# Patient Record
Sex: FEMALE | Race: WHITE | NOT HISPANIC OR LATINO | Employment: OTHER | URBAN - METROPOLITAN AREA
[De-identification: names, ages, dates, MRNs, and addresses within clinical notes are randomized per-mention and may not be internally consistent; named-entity substitution may affect disease eponyms.]

---

## 2017-01-30 NOTE — PROGRESS NOTES
SUBJECTIVE:                                                    Erwin Tolliver is a 81 year old female who presents to clinic today for the following health issues:    HPI    Acute Illness   Acute illness concerns: sinus  Onset: 1 month     Fever: no    Chills/Sweats: YES    Headache (location?): no    Sinus Pressure:YES    Conjunctivitis:  YES: left    Ear Pain: no    Rhinorrhea: YES    Congestion: YES    Sore Throat: no     Cough: YES-productive of green sputum    Wheeze: no    Decreased Appetite: no    Nausea: no    Vomiting: no    Diarrhea:  no    Dysuria/Freq.: no    Fatigue/Achiness: YES    Sick/Strep Exposure: no     Therapies Tried and outcome: Advil cold-helpful    Was seen in December, for similar issues, given doxycycline and it improved, but then symptoms recurred a month.  States had shingles episodes around [place] time. Then after the shingles cleared she then she started with runny nose and head congestion and cough, feeling run down and tired.   had similar issues but improved.     She tells me that she feels she has had sinus issues for the last 2 years, feels it just keeps coming back. She has had 4 sinusitis episodes in the clinic in the last 2 years that she has sought treatment for.  She also complains of fatigue during this time as well.    She wonders if her sinus issues are exacerbating her depression.  She answered her PHQ-9 with a positive thoughts of death or hurting herself, but when questioned she states that this was a mistake and should have been ), she hasn't felt that way for a long time.    Problem list and histories reviewed & adjusted, as indicated.  Additional history: as documented    Problem list, Medication list, Allergies, and Medical/Social/Surgical histories reviewed in [facility] and updated as appropriate.    ROS:  C: NEGATIVE for fever, chills, change in weight  R: denies shortness of breath   CV: NEGATIVE for chest pain, palpitations or peripheral  "edema    OBJECTIVE:                                                    /80 mmHg  Pulse 80  Temp(Src) 99  F (37.2  C) (Temporal)  Resp 16  Ht 5' 2.48\" (1.587 m)  Wt 150 lb (68.04 kg)  BMI 27.02 kg/m2  SpO2 97%  Body mass index is 27.02 kg/(m^2).  Gen: no apparent distress  HENT: Head; normocephalic, atraumatic. PERRLA, sclera and conjunctiva clear. ENT- bilateral TM normal without fluid or infection, neck without nodes, throat normal without erythema or exudate, maxillary sinuses tender and nasal mucosa pale and congested.  Chest/CV: S1 and S2 normal, no murmurs, clicks, gallops or rubs. Regular rate and rhythm. Chest is clear; no wheezes or rales. No edema.  Psych: Alert and oriented times 3; coherent speech, normal   rate and volume, able to articulate logical thoughts, able   to abstract reason, no tangential thoughts, no hallucinations   or delusions  Her affect is neutral.     ASSESSMENT/PLAN:                                                      1. Acute recurrent maxillary sinusitis  Will try Bactrim and Flonase, refer to ENT for recurrent sinus issues.    - sulfamethoxazole-trimethoprim (BACTRIM DS/SEPTRA DS) 800-160 MG per tablet; Take 1 tablet by mouth 2 times daily  Dispense: 20 tablet; Refill: 0  - OTOLARYNGOLOGY REFERRAL  - fluticasone (FLONASE) 50 MCG/ACT spray; Spray 2 sprays into both nostrils daily  Dispense: 16 g; Refill: 11    2. Hypothyroidism, unspecified type  Recheck labs.    - TSH with free T4 reflex  - levothyroxine (SYNTHROID/LEVOTHROID) 88 MCG tablet; Take 1 tablet (88 mcg) by mouth daily  Dispense: 90 tablet; Refill: 3    3. CKD (chronic kidney disease) stage 3, GFR 30-59 ml/min  Recheck labs.    - CBC with platelets  - Comprehensive metabolic panel  - Lipid panel reflex to direct LDL    4. Hypertension, goal below 150/90  Stable, refills given, labs drawn.    - CBC with platelets  - Comprehensive metabolic panel  - Lipid panel reflex to direct LDL  - metoprolol (TOPROL-XL) 100 " MG 24 hr tablet; Take 1 tablet (100 mg) by mouth daily  Dispense: 90 tablet; Refill: 3    5. Hyperlipidemia, unspecified  Remains on statin, labs drawn.    - Comprehensive metabolic panel  - Lipid panel reflex to direct LDL  - simvastatin (ZOCOR) 20 MG tablet; TAKE ONE TABLET BY MOUTH AT BEDTIME  Dispense: 90 tablet; Refill: 3    6. Gastroesophageal reflux disease without esophagitis  Stable.    - omeprazole (PRILOSEC) 20 MG CR capsule; TAKE ONE CAPSULE BY MOUTH EVERY DAY AS NEEDED  Dispense: 45 capsule; Refill: 3    7. Moderate recurrent major depression (H)  Stable, she adamantly denies suicidality.    - venlafaxine (EFFEXOR-XR) 150 MG 24 hr capsule; Take 1 capsule (150 mg) by mouth daily  Dispense: 90 capsule; Refill: 3  - buPROPion (WELLBUTRIN XL) 300 MG 24 hr tablet; Take 1 tablet (300 mg) by mouth every morning  Dispense: 90 tablet; Refill: 3    8. Insomnia, unspecified type  Controlled.    - traZODone (DESYREL) 50 MG tablet; TAKE 2-3 TABLETS BY MOUTH NIGHTLY AS NEEDED FOR SLEEP  Dispense: 270 tablet; Refill: 3    9. Bilateral shoulder pain, unspecified chronicity  Uses prn.    - naproxen (NAPROSYN) 500 MG tablet; Take 1 tablet (500 mg) by mouth 2 times daily as needed for moderate pain  Dispense: 180 tablet; Refill: 0    Jacqueline Brandt MD  [facility]

## 2017-01-31 ENCOUNTER — OFFICE VISIT (OUTPATIENT)
Dept: FAMILY MEDICINE | Facility: OTHER | Age: 82
End: 2017-01-31
Payer: COMMERCIAL

## 2017-01-31 VITALS
DIASTOLIC BLOOD PRESSURE: 80 MMHG | WEIGHT: 150 LBS | RESPIRATION RATE: 16 BRPM | HEART RATE: 80 BPM | TEMPERATURE: 99 F | BODY MASS INDEX: 27.6 KG/M2 | SYSTOLIC BLOOD PRESSURE: 140 MMHG | HEIGHT: 62 IN | OXYGEN SATURATION: 97 %

## 2017-01-31 DIAGNOSIS — E03.9 HYPOTHYROIDISM, UNSPECIFIED TYPE: ICD-10-CM

## 2017-01-31 DIAGNOSIS — G47.00 INSOMNIA, UNSPECIFIED TYPE: ICD-10-CM

## 2017-01-31 DIAGNOSIS — M25.512 BILATERAL SHOULDER PAIN, UNSPECIFIED CHRONICITY: ICD-10-CM

## 2017-01-31 DIAGNOSIS — J01.01 ACUTE RECURRENT MAXILLARY SINUSITIS: Primary | ICD-10-CM

## 2017-01-31 DIAGNOSIS — E78.5 HYPERLIPIDEMIA, UNSPECIFIED: ICD-10-CM

## 2017-01-31 DIAGNOSIS — N18.30 CKD (CHRONIC KIDNEY DISEASE) STAGE 3, GFR 30-59 ML/MIN (H): ICD-10-CM

## 2017-01-31 DIAGNOSIS — M25.511 BILATERAL SHOULDER PAIN, UNSPECIFIED CHRONICITY: ICD-10-CM

## 2017-01-31 DIAGNOSIS — F33.1 MODERATE RECURRENT MAJOR DEPRESSION (H): ICD-10-CM

## 2017-01-31 DIAGNOSIS — I10 HYPERTENSION, GOAL BELOW 150/90: ICD-10-CM

## 2017-01-31 DIAGNOSIS — K21.9 GASTROESOPHAGEAL REFLUX DISEASE WITHOUT ESOPHAGITIS: ICD-10-CM

## 2017-01-31 LAB
ALBUMIN SERPL-MCNC: 3.8 G/DL (ref 3.4–5)
ALP SERPL-CCNC: 84 U/L (ref 40–150)
ALT SERPL W P-5'-P-CCNC: 24 U/L (ref 0–50)
ANION GAP SERPL CALCULATED.3IONS-SCNC: 4 MMOL/L (ref 3–14)
AST SERPL W P-5'-P-CCNC: 17 U/L (ref 0–45)
BILIRUB SERPL-MCNC: 0.6 MG/DL (ref 0.2–1.3)
BUN SERPL-MCNC: 13 MG/DL (ref 7–30)
CALCIUM SERPL-MCNC: 8.7 MG/DL (ref 8.5–10.1)
CHLORIDE SERPL-SCNC: 102 MMOL/L (ref 94–109)
CHOLEST SERPL-MCNC: 161 MG/DL
CO2 SERPL-SCNC: 33 MMOL/L (ref 20–32)
CREAT SERPL-MCNC: 0.95 MG/DL (ref 0.52–1.04)
ERYTHROCYTE [DISTWIDTH] IN BLOOD BY AUTOMATED COUNT: 13.4 % (ref 10–15)
GFR SERPL CREATININE-BSD FRML MDRD: 56 ML/MIN/1.7M2
GLUCOSE SERPL-MCNC: 106 MG/DL (ref 70–99)
HCT VFR BLD AUTO: 39.6 % (ref 35–47)
HDLC SERPL-MCNC: 57 MG/DL
HGB BLD-MCNC: 13 G/DL (ref 11.7–15.7)
LDLC SERPL CALC-MCNC: 77 MG/DL
MCH RBC QN AUTO: 32.8 PG (ref 26.5–33)
MCHC RBC AUTO-ENTMCNC: 32.8 G/DL (ref 31.5–36.5)
MCV RBC AUTO: 100 FL (ref 78–100)
NONHDLC SERPL-MCNC: 104 MG/DL
PLATELET # BLD AUTO: 247 10E9/L (ref 150–450)
POTASSIUM SERPL-SCNC: 4 MMOL/L (ref 3.4–5.3)
PROT SERPL-MCNC: 7.5 G/DL (ref 6.8–8.8)
RBC # BLD AUTO: 3.96 10E12/L (ref 3.8–5.2)
SODIUM SERPL-SCNC: 139 MMOL/L (ref 133–144)
TRIGL SERPL-MCNC: 135 MG/DL
TSH SERPL DL<=0.005 MIU/L-ACNC: 3.97 MU/L (ref 0.4–4)
WBC # BLD AUTO: 9.9 10E9/L (ref 4–11)

## 2017-01-31 PROCEDURE — 80053 COMPREHEN METABOLIC PANEL: CPT | Performed by: FAMILY MEDICINE

## 2017-01-31 PROCEDURE — 85027 COMPLETE CBC AUTOMATED: CPT | Performed by: FAMILY MEDICINE

## 2017-01-31 PROCEDURE — 36415 COLL VENOUS BLD VENIPUNCTURE: CPT | Performed by: FAMILY MEDICINE

## 2017-01-31 PROCEDURE — 99214 OFFICE O/P EST MOD 30 MIN: CPT | Mod: 25 | Performed by: FAMILY MEDICINE

## 2017-01-31 PROCEDURE — 80061 LIPID PANEL: CPT | Performed by: FAMILY MEDICINE

## 2017-01-31 PROCEDURE — 84443 ASSAY THYROID STIM HORMONE: CPT | Performed by: FAMILY MEDICINE

## 2017-01-31 RX ORDER — LEVOTHYROXINE SODIUM 88 UG/1
88 TABLET ORAL DAILY
Qty: 90 TABLET | Refills: 3 | Status: SHIPPED | OUTPATIENT
Start: 2017-01-31 | End: 2017-11-24

## 2017-01-31 RX ORDER — VENLAFAXINE HYDROCHLORIDE 150 MG/1
150 CAPSULE, EXTENDED RELEASE ORAL DAILY
Qty: 90 CAPSULE | Refills: 3 | Status: SHIPPED | OUTPATIENT
Start: 2017-01-31 | End: 2017-05-16

## 2017-01-31 RX ORDER — SIMVASTATIN 20 MG
TABLET ORAL
Qty: 90 TABLET | Refills: 3 | Status: SHIPPED | OUTPATIENT
Start: 2017-01-31 | End: 2017-11-24

## 2017-01-31 RX ORDER — TRAZODONE HYDROCHLORIDE 50 MG/1
TABLET, FILM COATED ORAL
Qty: 270 TABLET | Refills: 3 | Status: SHIPPED | OUTPATIENT
Start: 2017-01-31 | End: 2017-11-24

## 2017-01-31 RX ORDER — METOPROLOL SUCCINATE 100 MG/1
100 TABLET, EXTENDED RELEASE ORAL DAILY
Qty: 90 TABLET | Refills: 3 | Status: SHIPPED | OUTPATIENT
Start: 2017-01-31 | End: 2017-11-24

## 2017-01-31 RX ORDER — BUPROPION HYDROCHLORIDE 300 MG/1
300 TABLET ORAL EVERY MORNING
Qty: 90 TABLET | Refills: 3 | Status: SHIPPED | OUTPATIENT
Start: 2017-01-31 | End: 2017-11-24

## 2017-01-31 RX ORDER — FLUTICASONE PROPIONATE 50 MCG
2 SPRAY, SUSPENSION (ML) NASAL DAILY
Qty: 16 G | Refills: 11 | Status: SHIPPED | OUTPATIENT
Start: 2017-01-31 | End: 2017-05-16

## 2017-01-31 RX ORDER — NAPROXEN 500 MG/1
500 TABLET ORAL 2 TIMES DAILY PRN
Qty: 180 TABLET | Refills: 0 | Status: SHIPPED | OUTPATIENT
Start: 2017-01-31 | End: 2017-06-13

## 2017-01-31 RX ORDER — SULFAMETHOXAZOLE/TRIMETHOPRIM 800-160 MG
1 TABLET ORAL 2 TIMES DAILY
Qty: 20 TABLET | Refills: 0 | Status: SHIPPED | OUTPATIENT
Start: 2017-01-31 | End: 2017-04-12

## 2017-01-31 ASSESSMENT — ANXIETY QUESTIONNAIRES
GAD7 TOTAL SCORE: 0
GAD7 TOTAL SCORE: 0
7. FEELING AFRAID AS IF SOMETHING AWFUL MIGHT HAPPEN: 0 = NOT AT ALL

## 2017-01-31 ASSESSMENT — PATIENT HEALTH QUESTIONNAIRE - PHQ9: SUM OF ALL RESPONSES TO PHQ QUESTIONS 1-9: 4

## 2017-01-31 NOTE — MR AVS SNAPSHOT
After Visit Summary   1/31/2017    Erwin Tolliver    MRN: [id number]           Patient Information     Date Of Birth          1935        Visit Information        Provider Department      1/31/2017 9:20 AM Jacqueline Brandt MD [facility]        Today's Diagnoses     Acute recurrent maxillary sinusitis    -  1     Hypothyroidism, unspecified type         CKD (chronic kidney disease) stage 3, GFR 30-59 ml/min         Hypertension, goal below 150/90         Hyperlipidemia, unspecified         Gastroesophageal reflux disease without esophagitis         Moderate recurrent major depression (H)         Insomnia, unspecified type         Bilateral shoulder pain, unspecified chronicity            Follow-ups after your visit        Additional Services     OTOLARYNGOLOGY REFERRAL       Your provider has referred you to: [facility]: [facility] [phone]   [email or web address]    Please be aware that coverage of these services is subject to the terms and limitations of your health insurance plan.  Call member services at your health plan with any benefit or coverage questions.      Please bring the following with you to your appointment:    (1) Any X-Rays, CTs or MRIs which have been performed.  Contact the facility where they were done to arrange for  prior to your scheduled appointment.   (2) List of current medications  (3) This referral request   (4) Any documents/labs given to you for this referral                  Your next 10 appointments already scheduled     Mar 01, 2017  9:15 AM   New Visit with Edwin Cox MD   [facility]    [address] [place] [address] [address]  [facility] [id number] [phone]              Who to contact     If you have questions or need follow up information about today's clinic visit or your schedule please contact [facility] directly at  "[phone].  Normal or non-critical lab and imaging results will be communicated to you by RETAIL PROhart, letter or phone within 4 business days after the clinic has received the results. If you do not hear from us within 7 days, please contact the clinic through LineHopt or phone. If you have a critical or abnormal lab result, we will notify you by phone as soon as possible.  Submit refill requests through [employer] or call your pharmacy and they will forward the refill request to us. Please allow 3 business days for your refill to be completed.          Additional Information About Your Visit        RETAIL PROhart Information     [employer] gives you secure access to your electronic health record. If you see a primary care provider, you can also send messages to your care team and make appointments. If you have questions, please call your primary care clinic.  If you do not have a primary care provider, please call [phone] and they will assist you.        Care EveryWhere ID     This is your Care EveryWhere ID. This could be used by other organizations to access your [facility] medical records  [id number]        Your Vitals Were     Pulse Temperature Respirations Height BMI (Body Mass Index) Pulse Oximetry    80 99  F (37.2  C) (Temporal) 16 5' 2.48\" (1.587 m) 27.02 kg/m2 97%       Blood Pressure from Last 3 Encounters:   01/31/17 140/80   12/01/16 151/86   08/16/16 142/81    Weight from Last 3 Encounters:   01/31/17 150 lb (68.04 kg)   05/27/16 149 lb 12.8 oz (67.949 kg)   04/15/16 148 lb (67.132 kg)              We Performed the Following     CBC with platelets     Comprehensive metabolic panel     Lipid panel reflex to direct LDL     OTOLARYNGOLOGY REFERRAL     TSH with free T4 reflex          Today's Medication Changes          These changes are accurate as of: 1/31/17 10:05 AM.  If you have any questions, ask your nurse or doctor.               Start taking these medicines.        Dose/Directions    " sulfamethoxazole-trimethoprim 800-160 MG per tablet   Commonly known as:  BACTRIM DS/SEPTRA DS   Used for:  Acute recurrent maxillary sinusitis   Started by:  Jacqueline Brandt MD        Dose:  1 tablet   Take 1 tablet by mouth 2 times daily   Quantity:  20 tablet   Refills:  0         These medicines have changed or have updated prescriptions.        Dose/Directions    metoprolol 100 MG 24 hr tablet   Commonly known as:  TOPROL-XL   This may have changed:  See the new instructions.   Used for:  Hypertension, goal below 150/90   Changed by:  Jacqueline Brandt MD        Dose:  100 mg   Take 1 tablet (100 mg) by mouth daily   Quantity:  90 tablet   Refills:  3       omeprazole 20 MG CR capsule   Commonly known as:  priLOSEC   This may have changed:  See the new instructions.   Used for:  Gastroesophageal reflux disease without esophagitis   Changed by:  Jacqueline Brandt MD        TAKE ONE CAPSULE BY MOUTH EVERY DAY AS NEEDED   Quantity:  45 capsule   Refills:  3       traZODone 50 MG tablet   Commonly known as:  DESYREL   This may have changed:  See the new instructions.   Used for:  Insomnia, unspecified type   Changed by:  Jacqueline Brandt MD        TAKE 2-3 TABLETS BY MOUTH NIGHTLY AS NEEDED FOR SLEEP   Quantity:  270 tablet   Refills:  3            Where to get your medicines      These medications were sent to [facility] [address] [address]     Phone:  [phone]    - buPROPion 300 MG 24 hr tablet  - fluticasone 50 MCG/ACT spray  - levothyroxine 88 MCG tablet  - metoprolol 100 MG 24 hr tablet  - naproxen 500 MG tablet  - omeprazole 20 MG CR capsule  - simvastatin 20 MG tablet  - sulfamethoxazole-trimethoprim 800-160 MG per tablet  - traZODone 50 MG tablet  - venlafaxine 150 MG 24 hr capsule             Primary Care Provider Office Phone # Fax #    Jacqueline Brandt [phone]       [facility]  [address] [facility] [address]  [facility] [address]        Thank you!     Thank you for choosing [facility]  for your care. Our goal is always to provide you with excellent care. Hearing back from our patients is one way we can continue to improve our services. Please take a few minutes to complete the written survey that you may receive in the mail after your visit with us. Thank you!             Your Updated Medication List - Protect others around you: Learn how to safely use, store and throw away your medicines at www.disposemymeds.org.          This list is accurate as of: 1/31/17 10:05 AM.  Always use your most recent med list.                   Brand Name Dispense Instructions for use    ADVIL COLD/SINUS PO          buPROPion 300 MG 24 hr tablet    WELLBUTRIN XL    90 tablet    Take 1 tablet (300 mg) by mouth every morning       fluticasone 50 MCG/ACT spray    FLONASE    16 g    Spray 2 sprays into both nostrils daily       levothyroxine 88 MCG tablet    SYNTHROID/LEVOTHROID    90 tablet    Take 1 tablet (88 mcg) by mouth daily       meloxicam 7.5 MG tablet    MOBIC    30 tablet    Take 1 tablet (7.5 mg) by mouth 2 times daily as needed (headache)       metoprolol 100 MG 24 hr tablet    TOPROL-XL    90 tablet    Take 1 tablet (100 mg) by mouth daily       naproxen 500 MG tablet    NAPROSYN    180 tablet    Take 1 tablet (500 mg) by mouth 2 times daily as needed for moderate pain       omeprazole 20 MG CR capsule    priLOSEC    45 capsule    TAKE ONE CAPSULE BY MOUTH EVERY DAY AS NEEDED       simvastatin 20 MG tablet    ZOCOR    90 tablet    TAKE ONE TABLET BY MOUTH AT BEDTIME       sulfamethoxazole-trimethoprim 800-160 MG per tablet    BACTRIM DS/SEPTRA DS    20 tablet    Take 1 tablet by mouth 2 times daily       traZODone 50 MG tablet    DESYREL    270 tablet    TAKE 2-3 TABLETS BY MOUTH NIGHTLY AS NEEDED FOR SLEEP       venlafaxine 150 MG 24 hr capsule    EFFEXOR-XR    90 capsule    Take 1 capsule  (150 mg) by mouth daily

## 2017-01-31 NOTE — Clinical Note
My Depression Action Plan  Name: Erwin Tolliver   Date of Birth 1935  Date: 1/30/2017    My doctor: Jacqueline Brandt   My clinic: [address]  [facility] [id number] [phone]          GREEN    ZONE   Good Control    What it looks like:     Things are going generally well. You have normal up s and down s. You may even feel depressed from time to time, but bad moods usually last less than a day.   What you need to do:  1. Continue to care for yourself (see self care plan)  2. Check your depression survival kit and update it as needed  3. Follow your physician s recommendations including any medication.  4. Do not stop taking medication unless you consult with your physician first.           YELLOW         ZONE Getting Worse    What it looks like:     Depression is starting to interfere with your life.     It may be hard to get out of bed; you may be starting to isolate yourself from others.    Symptoms of depression are starting to last most all day and this has happened for several days.     You may have suicidal thoughts but they are not constant.   What you need to do:     1. Call your care team, your response to treatment will improve if you keep your care team informed of your progress. Yellow periods are signs an adjustment may need to be made.     2. Continue your self-care, even if you have to fake it!    3. Talk to someone in your support network    4. Open up your depression survival kit           RED    ZONE Medical Alert - Get Help    What it looks like:     Depression is seriously interfering with your life.     You may experience these or other symptoms: You can t get out of bed most days, can t work or engage in other necessary activities, you have trouble taking care of basic hygiene, or basic responsibilities, thoughts of suicide or death that will not go away, self-injurious behavior.     What you need to do:  1. Call your  care team and request a same-day appointment. If they are not available (weekends or after hours) call your local crisis line, emergency room or 911.      Electronically signed by: Coco Pretty, January 30, 2017    Depression Self Care Plan / Survival Kit    Self-Care for Depression  Here s the deal. Your body and mind are really not as separate as most people think.  What you do and think affects how you feel and how you feel influences what you do and think. This means if you do things that people who feel good do, it will help you feel better.  Sometimes this is all it takes.  There is also a place for medication and therapy depending on how severe your depression is, so be sure to consult with your medical provider and/ or Behavioral Health Consultant if your symptoms are worsening or not improving.     In order to better manage my stress, I will:    Exercise  Get some form of exercise, every day. This will help reduce pain and release endorphins, the  feel good  chemicals in your brain. This is almost as good as taking antidepressants!  This is not the same as joining a gym and then never going! (they count on that by the way ) It can be as simple as just going for a walk or doing some gardening, anything that will get you moving.      Hygiene   Maintain good hygiene (Get out of bed in the morning, Make your bed, Brush your teeth, Take a shower, and Get dressed like you were going to work, even if you are unemployed).  If your clothes don't fit try to get ones that do.    Diet  I will strive to eat foods that are good for me, drink plenty of water, and avoid excessive sugar, caffeine, alcohol, and other mood-altering substances.  Some foods that are helpful in depression are: complex carbohydrates, B vitamins, flaxseed, fish or fish oil, fresh fruits and vegetables.    Psychotherapy  I agree to participate in Individual Therapy (if recommended).    Medication  If prescribed medications, I agree to take them.   Missing doses can result in serious side effects.  I understand that drinking alcohol, or other illicit drug use, may cause potential side effects.  I will not stop my medication abruptly without first discussing it with my provider.    Staying Connected With Others  I will stay in touch with my friends, family members, and my primary care provider/team.    Use your imagination  Be creative.  We all have a creative side; it doesn t matter if it s oil painting, sand castles, or mud pies! This will also kick up the endorphins.    Witness Beauty  (AKA stop and smell the roses) Take a look outside, even in mid-winter. Notice colors, textures. Watch the squirrels and birds.     Service to others  Be of service to others.  There is always someone else in need.  By helping others we can  get out of ourselves  and remember the really important things.  This also provides opportunities for practicing all the other parts of the program.    Humor  Laugh and be silly!  Adjust your TV habits for less news and crime-drama and more comedy.    Control your stress  Try breathing deep, massage therapy, biofeedback, and meditation. Find time to relax each day.     My support system    Clinic Contact:  Phone number:    Contact 1:  Phone number:    Contact 2:  Phone number:    Yazidism/:  Phone number:    Therapist:  Phone number:    Local crisis center:    Phone number:    Other community support:  Phone number:

## 2017-02-01 ASSESSMENT — ANXIETY QUESTIONNAIRES: GAD7 TOTAL SCORE: 0

## 2017-02-01 ASSESSMENT — PATIENT HEALTH QUESTIONNAIRE - PHQ9: SUM OF ALL RESPONSES TO PHQ QUESTIONS 1-9: 4

## 2017-02-28 NOTE — PROGRESS NOTES
History of Present Illness - Erwin Tolliver is a 81 year old female who presents with concerns about sinus symptoms. The main symptom recently has been nasal congestion, and this has been on going. Other associated symptoms have included pain and pressure in face, eye pain, sore throat, cough.  These symptoms have been intermittent and are very disruptive to the patient's lifestyle. Erwin has been seen for sinus problems 5 times this last year.  She has a lot of coughing and nasal drainage.  She denies pain in her ears and headaches.   Recent treatments have included BactrimDS, Advil cold, Flonase.  A 2 week trial of nasal steroids has been completed. A burst of oral steroids has not been completed. The patient has no history of sinus surgery. The patient reports a history for migraine headaches.    Past Medical History -   Patient Active Problem List   Diagnosis     Migraine with aura     Hypothyroidism     Hyperlipidemia, unspecified     Osteopenia     GERD (gastroesophageal reflux disease)     Atopic rhinitis     CKD (chronic kidney disease) stage 3, GFR 30-59 ml/min     SVT (supraventricular tachycardia) (H)     Advanced directives, counseling/discussion     Moderate recurrent major depression (H)     Macular degeneration (senile) of retina     Hypertension, goal below 150/90       Current Medications -   Current Outpatient Prescriptions:      sulfamethoxazole-trimethoprim (BACTRIM DS/SEPTRA DS) 800-160 MG per tablet, Take 1 tablet by mouth 2 times daily, Disp: 20 tablet, Rfl: 0     metoprolol (TOPROL-XL) 100 MG 24 hr tablet, Take 1 tablet (100 mg) by mouth daily, Disp: 90 tablet, Rfl: 3     omeprazole (PRILOSEC) 20 MG CR capsule, TAKE ONE CAPSULE BY MOUTH EVERY DAY AS NEEDED, Disp: 45 capsule, Rfl: 3     venlafaxine (EFFEXOR-XR) 150 MG 24 hr capsule, Take 1 capsule (150 mg) by mouth daily, Disp: 90 capsule, Rfl: 3     simvastatin (ZOCOR) 20 MG tablet, TAKE ONE TABLET BY MOUTH AT BEDTIME, Disp: 90  tablet, Rfl: 3     levothyroxine (SYNTHROID/LEVOTHROID) 88 MCG tablet, Take 1 tablet (88 mcg) by mouth daily, Disp: 90 tablet, Rfl: 3     buPROPion (WELLBUTRIN XL) 300 MG 24 hr tablet, Take 1 tablet (300 mg) by mouth every morning, Disp: 90 tablet, Rfl: 3     fluticasone (FLONASE) 50 MCG/ACT spray, Spray 2 sprays into both nostrils daily, Disp: 16 g, Rfl: 11     traZODone (DESYREL) 50 MG tablet, TAKE 2-3 TABLETS BY MOUTH NIGHTLY AS NEEDED FOR SLEEP, Disp: 270 tablet, Rfl: 3     naproxen (NAPROSYN) 500 MG tablet, Take 1 tablet (500 mg) by mouth 2 times daily as needed for moderate pain, Disp: 180 tablet, Rfl: 0     Pseudoephedrine-Ibuprofen (ADVIL COLD/SINUS PO), , Disp: , Rfl:      meloxicam (MOBIC) 7.5 MG tablet, Take 1 tablet (7.5 mg) by mouth 2 times daily as needed (headache), Disp: 30 tablet, Rfl: 5    Allergies -   Allergies   Allergen Reactions     Aspirin      headaches,tremors,nausea     Augmentin [Amoxicillin-Pot Clavulanate] Nausea and Vomiting       Social History -   Social History     Social History     Marital status:      Spouse name: Marcellus     Number of children: 4     Years of education: 12     Occupational History     retired      Social History Main Topics     Smoking status: Former Smoker     Packs/day: 0.50     Years: 10.00     Quit date: 1966     Smokeless tobacco: Never Used      Comment: no smokers in household     Alcohol use 1.0 oz/week      Comment: beerx2/ x8per month     Drug use: No     Sexual activity: Yes     Partners: Male     Birth control/ protection: Surgical      Comment: Complete Hysterectomy     Other Topics Concern     Parent/Sibling W/ Cabg, Mi Or Angioplasty Before 65f 55m? No     Social History Narrative       Family History -   Family History   Problem Relation Age of Onset     Alzheimer Disease Mother      Arthritis Sister       at 72 years     Lipids Sister      OSTEOPOROSIS Sister      osteoporosis     Genetic Disorder Sister      down syndrome      Alzheimer Disease Sister      Thyroid Disease Sister      Cardiovascular Brother       at 72 years of CHF       Review of Systems - As per HPI and PMHx, otherwise system review of the head and neck negative.    Physical Exam  Vitals: There were no vitals taken for this visit.  BMI= There is no height or weight on file to calculate BMI.    General - The patient is well nourished and well developed, and appears to have good nutritional status.  Alert and oriented to person and place, answers questions and cooperates with examination appropriately.     Head and Face - Normocephalic and atraumatic, with no gross asymmetry noted of the contour of the facial features.  The facial nerve is intact, with strong symmetric movements.    Voice and Breathing - The patient was breathing comfortably without the use of accessory muscles. There was no wheezing, stridor, or stertor.  The patients voice was clear and strong, and had appropriate pitch and quality.    Ears - [place] wears hearing aids.  Bilateral pinna and EACs with normal appearing overlying skin. Tympanic membrane intact with good mobility on pneumatic otoscopy bilaterally. Bony landmarks of the ossicular chain are normal. The tympanic membranes are normal in appearance. No retraction, perforation, or masses.  No fluid or purulence was seen in the external canal or the middle ear.     Eyes - Extraocular movements intact.  Sclera were not icteric or injected, conjunctiva were pink and moist.    Mouth - Examination of the oral cavity showed pink, healthy oral mucosa. No lesions or ulcerations noted.  The tongue was mobile and midline, and the dentition were in good condition.      Throat - The walls of the oropharynx were smooth, pink, moist, symmetric, and had no lesions or ulcerations.  The tonsillar pillars and soft palate were symmetric.  The uvula was midline on elevation.    Neck - Normal midline excursion of the laryngotracheal complex during swallowing.   Full range of motion on passive movement.  Palpation of the occipital, submental, submandibular, internal jugular chain, and supraclavicular nodes did not demonstrate any abnormal lymph nodes or masses.  The carotid pulse was palpable bilaterally.  Palpation of the thyroid was soft and smooth, with no nodules or goiter appreciated.  The trachea was mobile and midline.    Nose - External contour is symmetric, no gross deflection or scars.  [place] denies issues with seance of smell.  Nasal mucosa is pink and moist with no abnormal mucus.  Sores in bilateral nostrils of nose. The septum was slightly deviated and non-obstructive, turbinates of normal size and position.  No polyps, masses, or purulence noted on examination.    Neuro - Nonfocal neuro exam is normal, CN 2 through 12 intact, normal gait and muscle tone.        To further evaluate the nasal cavity, I performed rigid nasal endoscopy.  I first sprayed the nasal cavity bilaterally with a mix of lidocaine and neosynephrine.  I then began on the left side using a 2.7mm, 30 degree rigid nasal endoscope.  The septum was deviated to the Left and the nasal airway was open.  No abnormal secretions, purulence, or polyps were noted. The left middle turbinate and middle meatus were clearly visualized and normal in appearance.  Looking up, the olfactory cleft was unobstructed.  Going further back, the sphenoethmoid recess was normal in appearance, with healthy appearing mucosa on the face of the sphenoid.  The nasopharynx was unremarkable, and the eustachian tube opening on this side was unobstructed.    I then turned my attention to the right side.  Once again, the septum was deviated to the Left, and the airway was open.  No abnormal secretions, purulence, polyps were noted.  The right middle turbinate and middle meatus were clearly visualized and normal in appearance.  Looking up, the olfactory cleft was unobstructed.  Going further back the right sphenoethmoid recess  was normal in appearance, and eustachian tube opening was unobstructed.      ASSESSMENT/PLAN:  Erwin Tolliver is a 81 year old female with chronic sinusitis.    Since the symptoms have persisted longer than 10 days, a bacterial etiology should be suspected and therefore I have initiated therapy with Augmentin 875mg/500mg BID for 7 days, and encouraged use of saline irrigations for decongestion. I also offered that Afrin may be used twice daily for 5 days for symptom control, but that it should be used for no longer. I recommend NSAIDs for pain control and to reduce sinus inflammation. I look forward to our follow-up in 10-14 days to assess progress.    The prolonged nature of the symptoms warrants a workup for anatomic anomalies of the sinonasal outflow or evidence of persistent chronic inflammation. In an effort to maximize medical therapy I have started Clarithromycin 500 mg twice a day for 30 days. I will refer her to Allergy and also have her set up for a CT scan for her sinuses.   I encouraged daily nasal saline irrigations and instructed on the proper use of the nasal steroids, She will follow up with us in 6 weeks.    Edwin Cox MD

## 2017-03-01 ENCOUNTER — OFFICE VISIT (OUTPATIENT)
Dept: ALLERGY | Facility: OTHER | Age: 82
End: 2017-03-01
Payer: COMMERCIAL

## 2017-03-01 ENCOUNTER — OFFICE VISIT (OUTPATIENT)
Dept: OTOLARYNGOLOGY | Facility: OTHER | Age: 82
End: 2017-03-01
Payer: COMMERCIAL

## 2017-03-01 VITALS — WEIGHT: 148 LBS | HEART RATE: 84 BPM | BODY MASS INDEX: 26.66 KG/M2 | OXYGEN SATURATION: 96 %

## 2017-03-01 VITALS
DIASTOLIC BLOOD PRESSURE: 78 MMHG | SYSTOLIC BLOOD PRESSURE: 146 MMHG | OXYGEN SATURATION: 98 % | BODY MASS INDEX: 27.23 KG/M2 | HEIGHT: 62 IN | WEIGHT: 148 LBS | HEART RATE: 68 BPM

## 2017-03-01 DIAGNOSIS — J32.8 OTHER CHRONIC SINUSITIS: Primary | ICD-10-CM

## 2017-03-01 DIAGNOSIS — J30.89 SEASONAL ALLERGIC RHINITIS DUE TO OTHER ALLERGIC TRIGGER: ICD-10-CM

## 2017-03-01 DIAGNOSIS — Z88.0 PENICILLIN ALLERGY: ICD-10-CM

## 2017-03-01 DIAGNOSIS — R04.0 EPISTAXIS: ICD-10-CM

## 2017-03-01 DIAGNOSIS — J32.0 CHRONIC MAXILLARY SINUSITIS: Primary | ICD-10-CM

## 2017-03-01 PROCEDURE — 95004 PERQ TESTS W/ALRGNC XTRCS: CPT | Performed by: ALLERGY & IMMUNOLOGY

## 2017-03-01 PROCEDURE — 36415 COLL VENOUS BLD VENIPUNCTURE: CPT | Performed by: ALLERGY & IMMUNOLOGY

## 2017-03-01 PROCEDURE — 99000 SPECIMEN HANDLING OFFICE-LAB: CPT | Performed by: ALLERGY & IMMUNOLOGY

## 2017-03-01 PROCEDURE — 31231 NASAL ENDOSCOPY DX: CPT | Performed by: OTOLARYNGOLOGY

## 2017-03-01 PROCEDURE — 86003 ALLG SPEC IGE CRUDE XTRC EA: CPT | Performed by: ALLERGY & IMMUNOLOGY

## 2017-03-01 PROCEDURE — 99203 OFFICE O/P NEW LOW 30 MIN: CPT | Mod: 25 | Performed by: OTOLARYNGOLOGY

## 2017-03-01 PROCEDURE — 99204 OFFICE O/P NEW MOD 45 MIN: CPT | Mod: 25 | Performed by: ALLERGY & IMMUNOLOGY

## 2017-03-01 RX ORDER — IPRATROPIUM BROMIDE 21 UG/1
2 SPRAY, METERED NASAL 3 TIMES DAILY
Qty: 1 BOX | Refills: 3 | Status: SHIPPED | OUTPATIENT
Start: 2017-03-01 | End: 2021-11-26

## 2017-03-01 RX ORDER — CLARITHROMYCIN 500 MG
500 TABLET ORAL 2 TIMES DAILY
Qty: 60 TABLET | Refills: 0 | Status: SHIPPED | OUTPATIENT
Start: 2017-03-01 | End: 2017-04-12

## 2017-03-01 NOTE — MR AVS SNAPSHOT
After Visit Summary   3/1/2017    Erwin Tolliver    MRN: [id number]           Patient Information     Date Of Birth          1935        Visit Information        Provider Department      3/1/2017 9:15 AM Edwin Cox MD [facility]        Today's Diagnoses     Chronic maxillary sinusitis    -  1       Follow-ups after your visit        Additional Services     ALLERGY/ASTHMA ADULT REFERRAL       Your provider has referred you to: [facility]: [facility] [phone] [email or web address]    Please be aware that coverage of these services is subject to the terms and limitations of your health insurance plan.  Call member services at your health plan with any benefit or coverage questions.      Please bring the following with you to your appointment:    (1) Any X-Rays, CTs or MRIs which have been performed.  Contact the facility where they were done to arrange for  prior to your scheduled appointment.    (2) List of current medications  (3) This referral request   (4) Any documents/labs given to you for this referral                  Your next 10 appointments already scheduled     Mar 02, 2017  2:00 PM CST   CT MAXILLOFACIAL W/O CONTRAST with PHCT1   [facility] CT Scan ([facility])    [address] [place] [id number] [phone]           Please bring any scans or X-rays taken at other hospitals, if similar tests were done. Also bring a list of your medicines, including vitamins, minerals and over-the-counter drugs. It is safest to leave personal items at home.  Be sure to tell your doctor:   If you have any allergies.   If there s any chance you are pregnant.   If you are breastfeeding.   If you have any special needs.  You do not need to do anything special to prepare.  Please wear loose clothing, such as a sweat suit or jogging clothes. Avoid snaps, zippers and other metal. We may ask you  to undress and put on a hospital gown.              Future tests that were ordered for you today     Open Future Orders        Priority Expected Expires Ordered    CT Maxillofacial w/o Contrast Routine  3/1/2018 3/1/2017            Who to contact     If you have questions or need follow up information about today's clinic visit or your schedule please contact [facility] [place] directly at [phone].  Normal or non-critical lab and imaging results will be communicated to you by Litherahart, letter or phone within 4 business days after the clinic has received the results. If you do not hear from us within 7 days, please contact the clinic through MiNeedst or phone. If you have a critical or abnormal lab result, we will notify you by phone as soon as possible.  Submit refill requests through Revinate or call your pharmacy and they will forward the refill request to us. Please allow 3 business days for your refill to be completed.          Additional Information About Your Visit        LitheraharBitnami Information     Revinate gives you secure access to your electronic health record. If you see a primary care provider, you can also send messages to your care team and make appointments. If you have questions, please call your primary care clinic.  If you do not have a primary care provider, please call [phone] and they will assist you.        Care EveryWhere ID     This is your Care EveryWhere ID. This could be used by other organizations to access your [place] medical records  [id number]        Your Vitals Were     Pulse Pulse Oximetry BMI (Body Mass Index)             84 96% 26.66 kg/m2          Blood Pressure from Last 3 Encounters:   03/01/17 146/78   01/31/17 140/80   12/01/16 151/86    Weight from Last 3 Encounters:   03/01/17 67.1 kg (148 lb)   03/01/17 67.1 kg (148 lb)   01/31/17 68 kg (150 lb)              We Performed the Following     ALLERGY/ASTHMA ADULT REFERRAL     NASAL ENDOSCOPY, DIAGNOSTIC           Today's Medication Changes          These changes are accurate as of: 3/1/17 12:50 PM.  If you have any questions, ask your nurse or doctor.               Start taking these medicines.        Dose/Directions    clarithromycin 500 MG tablet   Commonly known as:  BIAXIN   Used for:  Chronic maxillary sinusitis   Started by:  Edwin Cox MD        Dose:  500 mg   Take 1 tablet (500 mg) by mouth 2 times daily   Quantity:  60 tablet   Refills:  0       ipratropium 0.03 % spray   Commonly known as:  ATROVENT   Used for:  Non-seasonal allergic rhinitis due to other allergic trigger   Started by:  Javed Gage DO        Dose:  2 spray   Spray 2 sprays into both nostrils 3 times daily   Quantity:  1 Box   Refills:  3            Where to get your medicines      These medications were sent to [facility] [place], [place] - [address]  [address], [facility] [address]     Phone:  [phone]     clarithromycin 500 MG tablet    ipratropium 0.03 % spray                Primary Care Provider Office Phone # Fax #    Jacqueline Brandt [phone]       [facility] [address] [facility] [address]  [employer] [address]        Thank you!     Thank you for choosing [facility]  for your care. Our goal is always to provide you with excellent care. Hearing back from our patients is one way we can continue to improve our services. Please take a few minutes to complete the written survey that you may receive in the mail after your visit with us. Thank you!             Your Updated Medication List - Protect others around you: Learn how to safely use, store and throw away your medicines at www.disposemymeds.org.          This list is accurate as of: 3/1/17 12:50 PM.  Always use your most recent med list.                   Brand Name Dispense Instructions for use    ADVIL COLD/SINUS PO          buPROPion 300 MG 24 hr tablet    WELLBUTRIN XL    90 tablet    Take 1 tablet (300  mg) by mouth every morning       clarithromycin 500 MG tablet    BIAXIN    60 tablet    Take 1 tablet (500 mg) by mouth 2 times daily       fluticasone 50 MCG/ACT spray    FLONASE    16 g    Spray 2 sprays into both nostrils daily       ipratropium 0.03 % spray    ATROVENT    1 Box    Spray 2 sprays into both nostrils 3 times daily       levothyroxine 88 MCG tablet    SYNTHROID/LEVOTHROID    90 tablet    Take 1 tablet (88 mcg) by mouth daily       meloxicam 7.5 MG tablet    MOBIC    30 tablet    Take 1 tablet (7.5 mg) by mouth 2 times daily as needed (headache)       metoprolol 100 MG 24 hr tablet    TOPROL-XL    90 tablet    Take 1 tablet (100 mg) by mouth daily       naproxen 500 MG tablet    NAPROSYN    180 tablet    Take 1 tablet (500 mg) by mouth 2 times daily as needed for moderate pain       omeprazole 20 MG CR capsule    priLOSEC    45 capsule    TAKE ONE CAPSULE BY MOUTH EVERY DAY AS NEEDED       simvastatin 20 MG tablet    ZOCOR    90 tablet    TAKE ONE TABLET BY MOUTH AT BEDTIME       sulfamethoxazole-trimethoprim 800-160 MG per tablet    BACTRIM DS/SEPTRA DS    20 tablet    Take 1 tablet by mouth 2 times daily       traZODone 50 MG tablet    DESYREL    270 tablet    TAKE 2-3 TABLETS BY MOUTH NIGHTLY AS NEEDED FOR SLEEP       venlafaxine 150 MG 24 hr capsule    EFFEXOR-XR    90 capsule    Take 1 capsule (150 mg) by mouth daily

## 2017-03-01 NOTE — MR AVS SNAPSHOT
After Visit Summary   3/1/2017    Erwin Tolliver    MRN: [id number]           Patient Information     Date Of Birth          1935        Visit Information        Provider Department      3/1/2017 10:20 AM Javed Gage,  [facility]        Today's Diagnoses     Other chronic sinusitis    -  1    Non-seasonal allergic rhinitis due to other allergic trigger          Care Instructions    If you have any questions regarding your allergies, asthma, or what we discussed during your visit today please call the allergy clinic or contact us via [employer].    [facility]: [phone]  [facility]: [phone]  [facility]: [phone]    Allergy RN Line (Melanie): [phone]    - Continue Flonase 2 sprays/nostril daily.   - Saline mist or gel 2-3 times daily. Buy Ayr brand.    - If epistaxis remains will have to discontinue nasal steroid.   - Agree with CT of sinuses.   - Agree with Biaxin for 30 days.   - Ipratropium nasal spray 2 sprays/nostril  three times daily as needed.         Follow-ups after your visit        Follow-up notes from your care team     Return in about 3 months (around 6/1/2017).      Your next 10 appointments already scheduled     Mar 02, 2017  2:00 PM CST   CT MAXILLOFACIAL W/O CONTRAST with PHCT1   [facility] CT Scan ([facility])    [address] [place] [address] [id number] [phone]           Please bring any scans or X-rays taken at other hospitals, if similar tests were done. Also bring a list of your medicines, including vitamins, minerals and over-the-counter drugs. It is safest to leave personal items at home.  Be sure to tell your doctor:   If you have any allergies.   If there s any chance you are pregnant.   If you are breastfeeding.   If you have any special needs.  You do not need to do anything special to prepare.  Please wear loose clothing, such as a sweat suit  "or jogging clothes. Avoid snaps, zippers and other metal. We may ask you to undress and put on a hospital gown.              Future tests that were ordered for you today     Open Future Orders        Priority Expected Expires Ordered    CT Maxillofacial w/o Contrast Routine  3/1/2018 3/1/2017            Who to contact     If you have questions or need follow up information about today's clinic visit or your schedule please contact [facility] directly at [phone].  Normal or non-critical lab and imaging results will be communicated to you by tolingohart, letter or phone within 4 business days after the clinic has received the results. If you do not hear from us within 7 days, please contact the clinic through [employer] or phone. If you have a critical or abnormal lab result, we will notify you by phone as soon as possible.  Submit refill requests through [employer] or call your pharmacy and they will forward the refill request to us. Please allow 3 business days for your refill to be completed.          Additional Information About Your Visit        [employer] Information     [employer] gives you secure access to your electronic health record. If you see a primary care provider, you can also send messages to your care team and make appointments. If you have questions, please call your primary care clinic.  If you do not have a primary care provider, please call [phone] and they will assist you.        Care EveryWhere ID     This is your Care EveryWhere ID. This could be used by other organizations to access your [facility] medical records  [id number]        Your Vitals Were     Pulse Height Pulse Oximetry BMI (Body Mass Index)          68 5' 2\" (1.575 m) 98% 27.07 kg/m2         Blood Pressure from Last 3 Encounters:   03/01/17 146/78   01/31/17 140/80   12/01/16 151/86    Weight from Last 3 Encounters:   03/01/17 148 lb (67.1 kg)   03/01/17 148 lb (67.1 kg)   01/31/17 150 lb (68 kg)              We " Performed the Following     Allergen alternaria alternata IgE     Allergen tyra white IgE     Allergen aspergillus fumigatus IgE     Allergen cat epithellium IgE     Allergen Cedar IgE     Allergen cladosporium herbarum IgE     Allergen cottonwood IgE     Allergen D farinae IgE     Allergen D pteronyssinus IgE     Allergen dog epithelium IgE     Allergen elm IgE     Allergen English plantain IgE     Allergen Epicoccum purpurascens IgE     Allergen giant ragweed IgE     Allergen lamb's quarter IgE     Allergen maple box elder IgE     Allergen Mugwort IgE     Allergen [place] White     Allergen oak white IgE     Allergen penicillium notatum IgE     Allergen ragweed short IgE     Allergen Red [place] IgE     Allergen Sagebrush Wormwood IgE     Allergen Sheep Sorrel IgE     Allergen silver  birch IgE     Allergen thistle Russian IgE     Allergen celeste IgE     Allergen [place] Tree     Allergen Weed Nettle IgE     Allergen, Kochia/Firebush          Today's Medication Changes          These changes are accurate as of: 3/1/17 11:02 AM.  If you have any questions, ask your nurse or doctor.               Start taking these medicines.        Dose/Directions    clarithromycin 500 MG tablet   Commonly known as:  BIAXIN   Used for:  Chronic maxillary sinusitis   Started by:  Edwin Cox MD        Dose:  500 mg   Take 1 tablet (500 mg) by mouth 2 times daily   Quantity:  60 tablet   Refills:  0       ipratropium 0.03 % spray   Commonly known as:  ATROVENT   Used for:  Non-seasonal allergic rhinitis due to other allergic trigger   Started by:  Javed Gage DO        Dose:  2 spray   Spray 2 sprays into both nostrils 3 times daily   Quantity:  1 Box   Refills:  3            Where to get your medicines      These medications were sent to [place], [place] - [address] [address]     Phone:  [phone]     clarithromycin 500 MG tablet    ipratropium 0.03 % spray                 Primary Care Provider Office Phone # Fax #    Jacqueline ALEX Brandt [phone]       [facility] [address] [facility] [address]  [facility] [address]        Thank you!     Thank you for choosing [facility]  for your care. Our goal is always to provide you with excellent care. Hearing back from our patients is one way we can continue to improve our services. Please take a few minutes to complete the written survey that you may receive in the mail after your visit with us. Thank you!             Your Updated Medication List - Protect others around you: Learn how to safely use, store and throw away your medicines at www.disposemymeds.org.          This list is accurate as of: 3/1/17 11:02 AM.  Always use your most recent med list.                   Brand Name Dispense Instructions for use    ADVIL COLD/SINUS PO          buPROPion 300 MG 24 hr tablet    WELLBUTRIN XL    90 tablet    Take 1 tablet (300 mg) by mouth every morning       clarithromycin 500 MG tablet    BIAXIN    60 tablet    Take 1 tablet (500 mg) by mouth 2 times daily       fluticasone 50 MCG/ACT spray    FLONASE    16 g    Spray 2 sprays into both nostrils daily       ipratropium 0.03 % spray    ATROVENT    1 Box    Spray 2 sprays into both nostrils 3 times daily       levothyroxine 88 MCG tablet    SYNTHROID/LEVOTHROID    90 tablet    Take 1 tablet (88 mcg) by mouth daily       meloxicam 7.5 MG tablet    MOBIC    30 tablet    Take 1 tablet (7.5 mg) by mouth 2 times daily as needed (headache)       metoprolol 100 MG 24 hr tablet    TOPROL-XL    90 tablet    Take 1 tablet (100 mg) by mouth daily       naproxen 500 MG tablet    NAPROSYN    180 tablet    Take 1 tablet (500 mg) by mouth 2 times daily as needed for moderate pain       omeprazole 20 MG CR capsule    priLOSEC    45 capsule    TAKE ONE CAPSULE BY MOUTH EVERY DAY AS NEEDED       simvastatin 20 MG tablet    ZOCOR    90 tablet    TAKE ONE TABLET BY  MOUTH AT BEDTIME       sulfamethoxazole-trimethoprim 800-160 MG per tablet    BACTRIM DS/SEPTRA DS    20 tablet    Take 1 tablet by mouth 2 times daily       traZODone 50 MG tablet    DESYREL    270 tablet    TAKE 2-3 TABLETS BY MOUTH NIGHTLY AS NEEDED FOR SLEEP       venlafaxine 150 MG 24 hr capsule    EFFEXOR-XR    90 capsule    Take 1 capsule (150 mg) by mouth daily

## 2017-03-01 NOTE — NURSING NOTE
"Chief Complaint   Patient presents with     Consult     Referring Jacqueline Brandt MD     Sinus Problem       Initial Pulse 84  Wt 67.1 kg (148 lb)  SpO2 96%  BMI 26.66 kg/m2 Estimated body mass index is 26.66 kg/(m^2) as calculated from the following:    Height as of 1/31/17: 1.587 m (5' 2.48\").    Weight as of this encounter: 67.1 kg (148 lb).  Medication Reconciliation: complete  "

## 2017-03-01 NOTE — ASSESSMENT & PLAN NOTE
Patient reports hepatitis after receiving penicillin antibiotic previously. She has subsequently avoided.     - Continue to avoid penicillin antibiotic.

## 2017-03-01 NOTE — PATIENT INSTRUCTIONS
If you have any questions regarding your allergies, asthma, or what we discussed during your visit today please call the allergy clinic or contact us via [employer].    [facility]: [phone]  [facility]: [phone]  [facility]: [phone]    Allergy RN Line (Melanie): [phone]    - Continue Flonase 2 sprays/nostril daily.   - Saline mist or gel 2-3 times daily. Buy Ayr brand.    - If epistaxis remains will have to discontinue nasal steroid.   - Agree with CT of sinuses.   - Agree with Biaxin for 30 days.   - Ipratropium nasal spray 2 sprays/nostril  three times daily as needed.

## 2017-03-01 NOTE — PROGRESS NOTES
Erwin Tolliver is a 81 year old White female with previous medical history significant for GERD, hypothyroidism, hyperlipidemia, hypertension, chronic kidney disease and depression. Erwin Tolliver is being seen today for evaluation of seasonal allergies. The patient is being seen in consultation at the request of Dr. Edwin Cox MD.     The patient reports that since the mid 90s she has had perennial nasal symptoms. These symptoms are rhinorrhea, sneezing, congestion, postnasal drip. She denies nasal itching, ocular itching or ocular watering. She reports symptoms started around the time she moved into a new house. Symptoms are more significant when she rakes leaves. She has a cat in the house, but does not think cats cause symptoms. Treatment has included Flonase 2 sprays per nostril daily. However, she has used only twice weekly. She gets frequent nosebleeds and has scabs inside her nose. Flonase has been minimally helpful. She has not tried montelukast. Symptoms are present day and night. Symptoms are not made worse with eating, temperature changes, dust, exercise. Symptoms are made worse with cigarette smoke. No history of allergy testing. She was treated on January 31, 2017 with Bactrim for acute maxillary sinusitis. She had been having maxillary sinus pressure and green nasal discharge. These symptoms have improved. She has clear rhinorrhea. No further sinus pressure. Sense of smell is intact. She was seen by ENT today who put her on clarithromycin for 30 days. She is additionally scheduled to get a CT scan of her sinuses tomorrow. She has a history of hypothyroidism. Most recent TSH was normal at 3.97.    No history of asthma, food allergies, hives.    She previously took Seldane which caused liver damage. She additionally has had hepatitis with penicillin antibiotic.    ENVIRONMENTAL HISTORY: [place] The home is heated with a forced air. They does  have central air conditioning. The patient's bedroom is furnished with carpeting in bedroom, allergen mattress cover, allergen pillowcase cover and fabric window coverings.  Pets inside the house include 1 cat(s). There is not history of cockroach or mice infestation. There is/are 0 smokers in the house.  The house does not have a damp basement.     Past Medical History   Diagnosis Date     Arthritis      Depressive disorder      Depressive disorder, not elsewhere classified      Hepatitis, unspecified      From [facility]     Hypertension      Migraine with aura, without mention of intractable migraine without mention of status migrainosus      Need for prophylactic hormone replacement therapy (postmenopausal)      Palpitations      Thyroid disease      Unspecified essential hypertension      Family History   Problem Relation Age of Onset     Alzheimer Disease Mother      Arthritis Sister       at 72 years     Lipids Sister      OSTEOPOROSIS Sister      osteoporosis     Genetic Disorder Sister      down syndrome     Alzheimer Disease Sister      Thyroid Disease Sister      Cardiovascular Brother       at 72 years of CHF     Past Surgical History   Procedure Laterality Date     C total abdom hysterectomy       Hysterectomy, Total Abdominal and BSO -benign - age 41     Hc reduction of large breast       Hc knee scope, diagnostic       Arthroscopy, Knee     Hc colonoscopy w biopsy  7     Appendectomy       Colonoscopy       Ent surgery       Eye surgery       Orthopedic surgery       Back surgery       bulged disc       REVIEW OF SYSTEMS:  General: negative for weight gain. negative for weight loss. negative for changes in sleep.   Ears: negative for fullness. positive  for hearing loss. positive  for dizziness.   Nose: positive  for snoring.negative for changes in smell. positive  for drainage.   Eyes: negative for eye watering. positive  for eye itching. negative for vision changes. negative  for eye redness.  Throat: negative for hoarseness. negative for sore throat. positive  for trouble swallowing.   Lungs: negative for shortness of breath.negative for wheezing. positive  for sputum production.   Cardiovascular: negative for chest pain. negative for swelling of ankles. negative for fast or irregular heartbeat.   Gastrointestinal: negative for nausea. positive  for heartburn. negative for acid reflux.   Musculoskeletal: positive  for joint pain. positive  for joint stiffness. negative for joint swelling.   Neurologic: negative for seizures. negative for fainting. negative for weakness.   Psychiatric: negative for changes in mood. negative for anxiety.   Endocrine: negative for cold intolerance. negative for heat intolerance. negative for tremors.   Lymphatic: negative for lower extremity swelling. negative for lymph node swelling.   Hematologic: negative for easy bruising. negative for easy bleeding.  Integumentary: negative for rash. negative for scaling. negative for nail changes.       Current Outpatient Prescriptions:      clarithromycin (BIAXIN) 500 MG tablet, Take 1 tablet (500 mg) by mouth 2 times daily, Disp: 60 tablet, Rfl: 0     ipratropium (ATROVENT) 0.03 % spray, Spray 2 sprays into both nostrils 3 times daily, Disp: 1 Box, Rfl: 3     sulfamethoxazole-trimethoprim (BACTRIM DS/SEPTRA DS) 800-160 MG per tablet, Take 1 tablet by mouth 2 times daily, Disp: 20 tablet, Rfl: 0     metoprolol (TOPROL-XL) 100 MG 24 hr tablet, Take 1 tablet (100 mg) by mouth daily, Disp: 90 tablet, Rfl: 3     omeprazole (PRILOSEC) 20 MG CR capsule, TAKE ONE CAPSULE BY MOUTH EVERY DAY AS NEEDED, Disp: 45 capsule, Rfl: 3     venlafaxine (EFFEXOR-XR) 150 MG 24 hr capsule, Take 1 capsule (150 mg) by mouth daily, Disp: 90 capsule, Rfl: 3     simvastatin (ZOCOR) 20 MG tablet, TAKE ONE TABLET BY MOUTH AT BEDTIME, Disp: 90 tablet, Rfl: 3     levothyroxine (SYNTHROID/LEVOTHROID) 88 MCG tablet, Take 1 tablet (88 mcg) by mouth  daily, Disp: 90 tablet, Rfl: 3     buPROPion (WELLBUTRIN XL) 300 MG 24 hr tablet, Take 1 tablet (300 mg) by mouth every morning, Disp: 90 tablet, Rfl: 3     fluticasone (FLONASE) 50 MCG/ACT spray, Spray 2 sprays into both nostrils daily, Disp: 16 g, Rfl: 11     traZODone (DESYREL) 50 MG tablet, TAKE 2-3 TABLETS BY MOUTH NIGHTLY AS NEEDED FOR SLEEP, Disp: 270 tablet, Rfl: 3     naproxen (NAPROSYN) 500 MG tablet, Take 1 tablet (500 mg) by mouth 2 times daily as needed for moderate pain, Disp: 180 tablet, Rfl: 0     Pseudoephedrine-Ibuprofen (ADVIL COLD/SINUS PO), , Disp: , Rfl:      meloxicam (MOBIC) 7.5 MG tablet, Take 1 tablet (7.5 mg) by mouth 2 times daily as needed (headache), Disp: 30 tablet, Rfl: 5  Immunization History   Administered Date(s) Administered     Influenza (High Dose) 3 valent vaccine 11/12/2010, 11/16/2011, 09/26/2012, 11/01/2013, 12/29/2014, 01/07/2016     Influenza (IIV3) 10/21/1994, 10/25/1995, 10/28/1996, 10/01/1997, 11/25/1998, 12/13/2000, 11/01/2001, 11/21/2003, 11/11/2004, 11/23/2005, 11/14/2006, 11/26/2007, 11/20/2008, 12/15/2009     Pneumococcal (PCV 13) 02/26/2016     Pneumococcal 23 valent 10/09/2001     TD (ADULT, 7+) 05/30/1995, 11/21/2003     Tdap (Adacel,Boostrix) 11/01/2013     Zoster vaccine, live 11/01/2013     Allergies   Allergen Reactions     Aspirin      headaches,tremors,nausea     Augmentin [Amoxicillin-Pot Clavulanate] Nausea and Vomiting         EXAM:   Constitutional:  Appears well-developed and well-nourished. No distress.   HEENT:   Head: Normocephalic.   Right Ear: External ear normal. TM scarring noted  Left Ear: External ear normal. TM scarring noted  Mouth/Throat:   No cobblestoning of posterior oropharynx.   Nasal tissue pink and normal appearing.  Deviated septum to the left.   No rhinorrhea noted.    Eyes: Conjunctivae are non-erythematous   No maxillary or frontal sinus tenderness to palpation.   Cardiovascular: Normal rate, regular rhythm and normal heart  sounds. Exam reveals no gallop and no friction rub.   No murmur heard.  Respiratory: Effort normal and breath sounds normal. No respiratory distress. No wheezes. No rales.   Musculoskeletal: Normal range of motion.   Lymphadenopathy:   No cervical adenopathy.   No lower extremity edema.   Neuro: Oriented to person, place, and time.  Skin: Skin is warm and dry. No rash noted.   Psychiatric: Normal mood and affect.     Nursing note and vitals reviewed.      WORKUP:   Skin testing:  Non-reactive histamine. Cannot interpret skin testing.     ASSESSMENT/PLAN:  Problem List Items Addressed This Visit        Respiratory    Seasonal allergic rhinitis due to other allergic trigger       Perennial rhinorrhea, sneezing, congestion, postnasal drip. No seasonal worsening. Flonase has been used and minimally helpful. Seldane use previously and caused hepatitis. Seen by ENT today and CT of sinuses and biaxin prescribed.      Skin testing:  Non-reactive histamine. Cannot interpret skin testing.     - Symptoms either from chronic allergic inflammation, chronic sinusitis, vasomotor rhinitis or secondary to medication induced rhinorrhea. Specifically beta blocker. Likely vasomotor component as well as primary complaint is rhinorrhea and post nasal drip.    - Serum IgE for environmental allergens.   - Continue Flonase 2 sprays/nostril daily.   - Saline mist or gel 2-3 times daily.   - If epistaxis remains will have to discontinue nasal steroid.   - Agree with CT of sinuses.   - Agree with Biaxin for 30 days.   - Ipratropium nasal spray tid as needed.          Relevant Medications    ipratropium (ATROVENT) 0.03 % spray    Other Relevant Orders    Allergen cat epithellium IgE (Completed)    Allergen dog epithelium IgE (Completed)    Allergen celeste IgE (Completed)    Allergen D pteronyssinus IgE (Completed)    Allergen D farinae IgE (Completed)    Allergen alternaria alternata IgE (Completed)    Allergen Epicoccum purpurascens IgE  (Completed)    Allergen penicillium notatum IgE (Completed)    Allergen aspergillus fumigatus IgE (Completed)    Allergen cladosporium herbarum IgE (Completed)    Allergen tyra white IgE (Completed)    Allergen [place] IgE (Completed)    Allergen cottonwood IgE (Completed)    Allergen elm IgE (Completed)    Allergen oak white IgE (Completed)    Allergen Red [place] IgE (Completed)    Allergen silver  birch IgE (Completed)    Allergen [place] Tree (Completed)    Allergen maple box elder IgE (Completed)    Allergen [place] White (Completed)    Allergen giant ragweed IgE (Completed)    Allergen English plantain IgE (Completed)    Allergen Sagebrush Wormwood IgE (Completed)    Allergen Sheep Sorrel IgE (Completed)    Allergen thistle Russian IgE (Completed)    Allergen lamb's quarter IgE (Completed)    Allergen Mugwort IgE (Completed)    Allergen ragweed short IgE (Completed)    Allergen, Kochia/Firebush (Completed)    Allergen Weed Nettle IgE (Completed)    ALLERGY SKIN TESTS,ALLERGENS (Completed)    Other chronic sinusitis - Primary    Relevant Medications    ipratropium (ATROVENT) 0.03 % spray    Other Relevant Orders    ALLERGY SKIN TESTS,ALLERGENS (Completed)    Epistaxis     History of epistaxis with nose bleeding and intranasal scabbing frequently noted. Using Flonase. No use of nasal saline mist or gel.     - Saline mist or gel 2-3 times daily.   - If continues to have symptoms will have to discontinue nasal steroid.   - Continue ENT follow up.             Other    Penicillin allergy     Patient reports hepatitis after receiving penicillin antibiotic previously. She has subsequently avoided.     - Continue to avoid penicillin antibiotic.          Relevant Medications    ipratropium (ATROVENT) 0.03 % spray              Chart documentation with Dragon Voice recognition Software. Although reviewed after completion, some words and grammatical errors may remain.    Javed Gage,    Allergy/Immunology  [facility], [place], [place]

## 2017-03-01 NOTE — ASSESSMENT & PLAN NOTE
History of epistaxis with nose bleeding and intranasal scabbing frequently noted. Using Flonase. No use of nasal saline mist or gel.     - Saline mist or gel 2-3 times daily.   - If continues to have symptoms will have to discontinue nasal steroid.   - Continue ENT follow up.

## 2017-03-01 NOTE — Clinical Note
I saw Erwin today as a new patient. Skin testing was non-reactive. Sent for blood testing. Agree with your plan. I added atrovent as needed as there may be a vasomotor component given predominant symptom she reported was rhinorrhea. I also suggested nasal saline for nasal dryness. Please see note for full details. Thanks.   Javed Gage

## 2017-03-01 NOTE — ASSESSMENT & PLAN NOTE
Perennial rhinorrhea, sneezing, congestion, postnasal drip. No seasonal worsening. Flonase has been used and minimally helpful. Seldane use previously and caused hepatitis. Seen by ENT today and CT of sinuses and biaxin prescribed.      Skin testing:  Non-reactive histamine. Cannot interpret skin testing.     - Symptoms either from chronic allergic inflammation, chronic sinusitis, vasomotor rhinitis or secondary to medication induced rhinorrhea. Specifically beta blocker. Likely vasomotor component as well as primary complaint is rhinorrhea and post nasal drip.    - Serum IgE for environmental allergens.   - Continue Flonase 2 sprays/nostril daily.   - Saline mist or gel 2-3 times daily.   - If epistaxis remains will have to discontinue nasal steroid.   - Agree with CT of sinuses.   - Agree with Biaxin for 30 days.   - Ipratropium nasal spray tid as needed.

## 2017-03-01 NOTE — NURSING NOTE
"Chief Complaint   Patient presents with     Consult     allergy testing       Initial /78 (BP Location: Left arm, Patient Position: Chair, Cuff Size: Adult Regular)  Pulse 68  Ht 5' 2\" (1.575 m)  Wt 148 lb (67.1 kg)  SpO2 98%  BMI 27.07 kg/m2 Estimated body mass index is 27.07 kg/(m^2) as calculated from the following:    Height as of this encounter: 5' 2\" (1.575 m).    Weight as of this encounter: 148 lb (67.1 kg).  Medication Reconciliation: complete   Yolanda Amin MA      "

## 2017-03-02 ENCOUNTER — HOSPITAL ENCOUNTER (OUTPATIENT)
Dept: CT IMAGING | Facility: CLINIC | Age: 82
Discharge: HOME | End: 2017-03-02
Attending: OTOLARYNGOLOGY | Admitting: OTOLARYNGOLOGY
Payer: MEDICARE

## 2017-03-02 DIAGNOSIS — J32.0 CHRONIC MAXILLARY SINUSITIS: ICD-10-CM

## 2017-03-02 PROCEDURE — 70486 CT MAXILLOFACIAL W/O DYE: CPT

## 2017-03-04 LAB
A ALTERNATA IGE QN: NORMAL KU(A)/L
A FUMIGATUS IGE QN: NORMAL KU(A)/L
C HERBARUM IGE QN: NORMAL KU(A)/L
CALIF WALNUT IGE QN: NORMAL
CAT DANDER IGG QN: NORMAL KU(A)/L
CEDAR IGE QN: NORMAL KU(A)/L
COMMON RAGWEED IGE QN: NORMAL KU(A)/L
COTTONWOOD IGE QN: NORMAL KU(A)/L
D FARINAE IGE QN: NORMAL KU(A)/L
D PTERONYSS IGE QN: NORMAL KU(A)/L
DEPRECATED MISC ALLERGEN IGE RAST QL: NORMAL
DOG DANDER+EPITH IGE QN: NORMAL KU(A)/L
E PURPURASCENS IGE QN: NORMAL KU(A)/L
ENGL PLANTAIN IGE QN: NORMAL KU(A)/L
FIREBUSH IGE QN: NORMAL KU(A)/L
GIANT RAGWEED IGE QN: NORMAL KU(A)/L
GOOSEFOOT IGE QN: NORMAL KU(A)/L
MAPLE IGE QN: NORMAL KU(A)/L
MUGWORT IGE QN: NORMAL KU(A)/L
NETTLE IGE QN: NORMAL KU(A)/L
P NOTATUM IGE QN: NORMAL KU(A)/L
RED MULBERRY IGE QN: NORMAL KU(A)/L
SALTWORT IGE QN: NORMAL KU(A)/L
SHEEP SORREL IGE QN: NORMAL KU(A)/L
SILVER BIRCH IGE QN: NORMAL KU(A)/L
TIMOTHY IGE QN: NORMAL KU(A)/L
WHITE ASH IGE QN: NORMAL KU(A)/L
WHITE ELM IGE QN: NORMAL KU(A)/L
WHITE MULBERRY IGE QN: NORMAL
WHITE OAK IGE QN: NORMAL KU(A)/L
WORMWOOD IGE QN: NORMAL KU(A)/L

## 2017-04-12 ENCOUNTER — OFFICE VISIT (OUTPATIENT)
Dept: OTOLARYNGOLOGY | Facility: OTHER | Age: 82
End: 2017-04-12
Payer: COMMERCIAL

## 2017-04-12 ENCOUNTER — TELEPHONE (OUTPATIENT)
Dept: OTOLARYNGOLOGY | Facility: OTHER | Age: 82
End: 2017-04-12

## 2017-04-12 VITALS — HEART RATE: 66 BPM | OXYGEN SATURATION: 98 % | WEIGHT: 144 LBS | BODY MASS INDEX: 26.34 KG/M2

## 2017-04-12 DIAGNOSIS — J34.2 DEVIATED NASAL SEPTUM: Primary | ICD-10-CM

## 2017-04-12 DIAGNOSIS — J31.0 CHRONIC RHINITIS: ICD-10-CM

## 2017-04-12 DIAGNOSIS — J34.3 NASAL TURBINATE HYPERTROPHY: ICD-10-CM

## 2017-04-12 PROCEDURE — 99214 OFFICE O/P EST MOD 30 MIN: CPT | Performed by: OTOLARYNGOLOGY

## 2017-04-12 NOTE — NURSING NOTE
"Chief Complaint   Patient presents with     RECHECK     recheck chronic sinusitis.       Initial Pulse 66  Wt 65.3 kg (144 lb)  SpO2 98%  BMI 26.34 kg/m2 Estimated body mass index is 26.34 kg/(m^2) as calculated from the following:    Height as of 3/1/17: 1.575 m (5' 2\").    Weight as of this encounter: 65.3 kg (144 lb).  Medication Reconciliation: complete  "

## 2017-04-12 NOTE — MR AVS SNAPSHOT
After Visit Summary   4/12/2017    Erwin Tolliver    MRN: [id number]           Patient Information     Date Of Birth          1935        Visit Information        Provider Department      4/12/2017 1:00 PM Edwin Cox MD [facility]        Today's Diagnoses     Deviated nasal septum    -  1    Nasal turbinate hypertrophy        Chronic rhinitis           Follow-ups after your visit        Your next 10 appointments already scheduled     Apr 25, 2017  1:20 PM CDT   Office Visit with Jacqueline Brandt MD   [facility] ([facility]    [address] [id number] [phone]           Bring a current list of meds and any records pertaining to this visit.  For Physicals, please bring immunization records and any forms needing to be filled out.  Please arrive 10 minutes early to complete paperwork.            Jun 13, 2017  1:00 PM CDT   Pre-Op physical with Jacqueline Brandt MD   [facility]    [address] [place] [id number] [phone]            Jun 26, 2017  9:00 AM CDT   [facility] Visit with Edwin Cox MD   [facility]    [address] [place] [id number] [phone]              Who to contact     If you have questions or need follow up information about today's clinic visit or your schedule please contact [facility] directly at [phone].  Normal or non-critical lab and imaging results will be communicated to you by MyChart, letter or phone within 4 business days after the clinic has received the results. If you do not hear from us within 7 days, please contact the clinic through [employer] or phone. If you have a critical or abnormal lab result, we will notify you by phone as soon as possible.  Submit refill requests through [employer] or call your pharmacy and they will  forward the refill request to us. Please allow 3 business days for your refill to be completed.          Additional Information About Your Visit        [employer] Information     [employer] gives you secure access to your electronic health record. If you see a primary care provider, you can also send messages to your care team and make appointments. If you have questions, please call your primary care clinic.  If you do not have a primary care provider, please call [phone] and they will assist you.        Care EveryWhere ID     This is your Care EveryWhere ID. This could be used by other organizations to access your [place] medical records  [id number]        Your Vitals Were     Pulse Pulse Oximetry BMI (Body Mass Index)             66 98% 26.34 kg/m2          Blood Pressure from Last 3 Encounters:   03/01/17 146/78   01/31/17 140/80   12/01/16 151/86    Weight from Last 3 Encounters:   04/12/17 65.3 kg (144 lb)   03/01/17 67.1 kg (148 lb)   03/01/17 67.1 kg (148 lb)              Today, you had the following     No orders found for display       Primary Care Provider Office Phone # Fax #    Jacqueline JOHNSON MD [place] [phone]       [facility] [address] [facility] [address]  [facility] [address]        Thank you!     Thank you for choosing [facility]  for your care. Our goal is always to provide you with excellent care. Hearing back from our patients is one way we can continue to improve our services. Please take a few minutes to complete the written survey that you may receive in the mail after your visit with us. Thank you!             Your Updated Medication List - Protect others around you: Learn how to safely use, store and throw away your medicines at www.disposemymeds.org.          This list is accurate as of: 4/12/17  1:45 PM.  Always use your most recent med list.                   Brand Name Dispense Instructions for use    ADVIL COLD/SINUS PO          buPROPion 300 MG 24 hr  tablet    WELLBUTRIN XL    90 tablet    Take 1 tablet (300 mg) by mouth every morning       fluticasone 50 MCG/ACT spray    FLONASE    16 g    Spray 2 sprays into both nostrils daily       ipratropium 0.03 % spray    ATROVENT    1 Box    Spray 2 sprays into both nostrils 3 times daily       levothyroxine 88 MCG tablet    SYNTHROID/LEVOTHROID    90 tablet    Take 1 tablet (88 mcg) by mouth daily       meloxicam 7.5 MG tablet    MOBIC    30 tablet    Take 1 tablet (7.5 mg) by mouth 2 times daily as needed (headache)       metoprolol 100 MG 24 hr tablet    TOPROL-XL    90 tablet    Take 1 tablet (100 mg) by mouth daily       naproxen 500 MG tablet    NAPROSYN    180 tablet    Take 1 tablet (500 mg) by mouth 2 times daily as needed for moderate pain       omeprazole 20 MG CR capsule    priLOSEC    45 capsule    TAKE ONE CAPSULE BY MOUTH EVERY DAY AS NEEDED       simvastatin 20 MG tablet    ZOCOR    90 tablet    TAKE ONE TABLET BY MOUTH AT BEDTIME       traZODone 50 MG tablet    DESYREL    270 tablet    TAKE 2-3 TABLETS BY MOUTH NIGHTLY AS NEEDED FOR SLEEP       venlafaxine 150 MG 24 hr capsule    EFFEXOR-XR    90 capsule    Take 1 capsule (150 mg) by mouth daily

## 2017-04-12 NOTE — PROGRESS NOTES
History of Present Illness - Erwin Tolliver is a 82 year old female presenting in clinic today for a recheck on Patient presents with:  RECHECK: recheck chronic sinusitis.        Present Symptoms include: otorhea, runny nose, post nasal drainage and facial pain/pressure and they are getting better . She does still have an occasional cough and runny nose.      Erwin has a history of ENT no surgery.        Past Medical History -   Past Medical History:   Diagnosis Date     Arthritis      Depressive disorder      Depressive disorder, not elsewhere classified      Hepatitis, unspecified 1993    From [facility]      Migraine with aura, without mention of intractable migraine without mention of status migrainosus      Need for prophylactic hormone replacement therapy (postmenopausal)      Palpitations      Thyroid disease      Unspecified essential hypertension        Current Medications -   Current Outpatient Prescriptions:      ipratropium (ATROVENT) 0.03 % spray, Spray 2 sprays into both nostrils 3 times daily, Disp: 1 Box, Rfl: 3     metoprolol (TOPROL-XL) 100 MG 24 hr tablet, Take 1 tablet (100 mg) by mouth daily, Disp: 90 tablet, Rfl: 3     omeprazole (PRILOSEC) 20 MG CR capsule, TAKE ONE CAPSULE BY MOUTH EVERY DAY AS NEEDED, Disp: 45 capsule, Rfl: 3     venlafaxine (EFFEXOR-XR) 150 MG 24 hr capsule, Take 1 capsule (150 mg) by mouth daily, Disp: 90 capsule, Rfl: 3     simvastatin (ZOCOR) 20 MG tablet, TAKE ONE TABLET BY MOUTH AT BEDTIME, Disp: 90 tablet, Rfl: 3     levothyroxine (SYNTHROID/LEVOTHROID) 88 MCG tablet, Take 1 tablet (88 mcg) by mouth daily, Disp: 90 tablet, Rfl: 3     buPROPion (WELLBUTRIN XL) 300 MG 24 hr tablet, Take 1 tablet (300 mg) by mouth every morning, Disp: 90 tablet, Rfl: 3     fluticasone (FLONASE) 50 MCG/ACT spray, Spray 2 sprays into both nostrils daily, Disp: 16 g, Rfl: 11     traZODone (DESYREL) 50 MG tablet, TAKE 2-3 TABLETS BY MOUTH NIGHTLY AS NEEDED FOR SLEEP, Disp:  270 tablet, Rfl: 3     naproxen (NAPROSYN) 500 MG tablet, Take 1 tablet (500 mg) by mouth 2 times daily as needed for moderate pain, Disp: 180 tablet, Rfl: 0     Pseudoephedrine-Ibuprofen (ADVIL COLD/SINUS PO), , Disp: , Rfl:      meloxicam (MOBIC) 7.5 MG tablet, Take 1 tablet (7.5 mg) by mouth 2 times daily as needed (headache), Disp: 30 tablet, Rfl: 5    Allergies -   Allergies   Allergen Reactions     Aspirin      headaches,tremors,nausea     Augmentin [Amoxicillin-Pot Clavulanate] Nausea and Vomiting       Social History -   Social History     Social History     Marital status:      Spouse name: Marcellus     Number of children: 4     Years of education: 12     Occupational History     retired      Social History Main Topics     Smoking status: Former Smoker     Packs/day: 0.50     Years: 10.00     Quit date: 1966     Smokeless tobacco: Never Used      Comment: no smokers in household     Alcohol use 1.0 oz/week      Comment: beerx2/ x8per month     Drug use: No     Sexual activity: Yes     Partners: Male     Birth control/ protection: Surgical      Comment: Complete Hysterectomy     Other Topics Concern     Parent/Sibling W/ Cabg, Mi Or Angioplasty Before 65f 55m? No     Social History Narrative       Family History -   Family History   Problem Relation Age of Onset     Alzheimer Disease Mother      Arthritis Sister       at 72 years     Lipids Sister      OSTEOPOROSIS Sister      osteoporosis     Genetic Disorder Sister      down syndrome     Alzheimer Disease Sister      Thyroid Disease Sister      Cardiovascular Brother       at 72 years of CHF       Review of Systems - As per HPI and PMHx, otherwise review of system review of the head and neck negative.    Physical Exam  Pulse 66  Wt 65.3 kg (144 lb)  SpO2 98%  BMI 26.34 kg/m2  BMI: Body mass index is 26.34 kg/(m^2).    General - The patient is well nourished and well developed, and appears to have good nutritional status.  Alert  and oriented to person and place, answers questions and cooperates with examination appropriately.    SKIN - No suspicious lesions or rashes.  Respiration - No respiratory distress.     Head and Face - Normocephalic and atraumatic, with no gross asymmetry noted of the contour of the facial features.  The facial nerve is intact, with strong symmetric movements.    Voice and Breathing - The patient was breathing comfortably without the use of accessory muscles. There was no wheezing, stridor, or stertor.  The patients voice was clear and strong, and had appropriate pitch and quality.    Ears - Bilateral pinna and EACs with normal appearing overlying skin. Tympanic membrane intact with good mobility on pneumatic otoscopy bilaterally. Bony landmarks of the ossicular chain are normal. The tympanic membranes are normal in appearance. No retraction, perforation, or masses.  No fluid or purulence was seen in the external canal or the middle ear.     Eyes - Extraocular movements intact.  Sclera were not icteric or injected, conjunctiva were pink and moist.    Mouth - Examination of the oral cavity showed pink, healthy oral mucosa. No lesions or ulcerations noted.  The tongue was mobile and midline, and the dentition were in good condition.      Throat - The walls of the oropharynx were smooth, pink, moist, symmetric, and had no lesions or ulcerations.  The tonsillar pillars and soft palate were symmetric.  The uvula was midline on elevation.    Neck - Normal midline excursion of the laryngotracheal complex during swallowing.  Full range of motion on passive movement.  Palpation of the occipital, submental, submandibular, internal jugular chain, and supraclavicular nodes did not demonstrate any abnormal lymph nodes or masses.  The carotid pulse was palpable bilaterally.  Palpation of the thyroid was soft and smooth, with no nodules or goiter appreciated.  The trachea was mobile and midline.    Nose - External contour is  symmetric, no gross deflection or scars.  Nasal mucosa is pink and moist with no abnormal mucus.  The septum deviated to the Left and non-obstructive, turbinates are enlarged.  No polyps, masses, or purulence noted on examination.    Neuro - Nonfocal neuro exam is normal, CN 2 through 12 intact, normal gait and muscle tone.      Performed in clinic today:  No procedures preformed in clinic today.    CT scan reviewed in clinic today:  IMPRESSION:   1. Mucosal thickening is present in the inferior aspect of the left  frontal sinus, several left anterior ethmoid sinuses, and the left  maxillary sinus.  2. Severe degenerative change in the left temporomandibular joint.      A/P - Erwin Tolliver is a 82 year old female Patient presents with:  RECHECK: recheck chronic sinusitis.    She has tried Flonase and Astelin in the past with minimal help for her breathing issues.  We discussed that many of her symptoms could be caused due to her deviated septum.  Discussed benefits and risks of Septoplasty and Turbinoplasty with [place].  Risks of the surgery discussed at length including but limited to septal perforation, bleeding, hyposthesia of upper teeth temporary. She would like to schedule this procedure.  She will discuss with staff to schedule this procedure.    I advised [place] to:  Use Humidified air  Monitor for signs of infection  Use Nasal Saline      Progress note was partially captured by Essence Kern MA and reviewed/addended by Dr. Cox for accuracy and content.      Edwin Cox MD

## 2017-05-09 ENCOUNTER — TELEPHONE (OUTPATIENT)
Dept: FAMILY MEDICINE | Facility: OTHER | Age: 82
End: 2017-05-09

## 2017-05-09 NOTE — TELEPHONE ENCOUNTER
"TC: Routing to provider as an FYI. Patient was able to reschedule. Has had a depression episode a few weeks ago and \"did not feel like sharing today because I do not want to cry.\" Stated she would inform TC at next OV.     Erwin Tolliver is a 82 year old female who presents to the clinic for a scheduled OV with TC today at 1:40pm.     NURSING ASSESSMENT:  Description: Informed provider was pulled away and not in the clinic for the rest of the day. Having some back pain, on 04/17/2017 had an epidurmal for back pain. Stated she does not think the epidural is still working as intended. Having difficulty getting up and going after sitting down. Appointment was for depression today. Had a bad depression episode a few weeks ago, stated would like to talk to TC about it, did not want to cry at the moment to explain her episode. Feels comfortable rescheduling for next Tuesday. Patient will check her schedule for next week when she gets home and if this appointment does not work will contact the clinic for a work in request with TC. Denies suicidal thoughts, a plan, intent to harm herself or others.    Onset/duration:  ongoing  Last exam/Treatment:  04/25/2017  Allergies:   Allergies   Allergen Reactions     Aspirin      headaches,tremors,nausea     Augmentin [Amoxicillin-Pot Clavulanate] Nausea and Vomiting     NURSING PLAN: Routed to provider Yes    RECOMMENDED DISPOSITION: Rescheduled.  Will comply with recommendation: Yes  If further questions/concerns or if symptoms do not improve, worsen or new symptoms develop, call your PCP or [facility] as soon as possible.    NOTES:  Disposition was determined by the first positive assessment question, therefore all previous assessment questions were negative    Guideline used:  Nursing judgment  Ashley Fragoso RN    "

## 2017-05-11 NOTE — PROGRESS NOTES
SUBJECTIVE:                                                    Erwin Tolliver is a 82 year old female who presents to clinic today for the following health issues:    HPI    Depression Followup    Status since last visit: Stable     See PHQ-9 for current symptoms.  Other associated symptoms: None    Complicating factors:   Significant life event:  No   Current substance abuse:  None  Anxiety or Panic symptoms:  No    PHQ-9  English PHQ-9   Any Language            Problem list and histories reviewed & adjusted, as indicated.  Additional history: as documented    Patient Active Problem List   Diagnosis     Migraine with aura     Hypothyroidism     Hyperlipidemia, unspecified     Osteopenia     GERD (gastroesophageal reflux disease)     Atopic rhinitis     CKD (chronic kidney disease) stage 3, GFR 30-59 ml/min     SVT (supraventricular tachycardia) (H)     Advanced directives, counseling/discussion     Moderate recurrent major depression (H)     Macular degeneration (senile) of retina     Hypertension, goal below 150/90     Seasonal allergic rhinitis due to other allergic trigger     Other chronic sinusitis     Penicillin allergy     Epistaxis     Past Surgical History:   Procedure Laterality Date     APPENDECTOMY       BACK SURGERY      bulged disc     C TOTAL ABDOM HYSTERECTOMY      Hysterectomy, Total Abdominal and BSO -benign - age 41     COLONOSCOPY       ENT SURGERY       EYE SURGERY       HC COLONOSCOPY W BIOPSY  11/07/06     HC KNEE SCOPE, DIAGNOSTIC      Arthroscopy, Knee     HC REDUCTION OF LARGE BREAST       ORTHOPEDIC SURGERY         Social History   Substance Use Topics     Smoking status: Former Smoker     Packs/day: 0.50     Years: 10.00     Quit date: 1/1/1966     Smokeless tobacco: Never Used      Comment: no smokers in household     Alcohol use 1.0 oz/week      Comment: beerx2/ x8per month     Family History   Problem Relation Age of Onset     Alzheimer Disease Mother      Arthritis Sister        at 72 years     Lipids Sister      OSTEOPOROSIS Sister      osteoporosis     Genetic Disorder Sister      down syndrome     Alzheimer Disease Sister      Thyroid Disease Sister      Cardiovascular Brother       at 72 years of CHF           ROS:  C: NEGATIVE for fever, chills, change in weight  E/M: NEGATIVE for ear, mouth and throat problems  R: NEGATIVE for significant cough or SOB  CV: NEGATIVE for chest pain, palpitations or peripheral edema    OBJECTIVE:                                                    /74  Pulse 76  Temp 99.1  F (37.3  C) (Temporal)  Resp 16  Wt 146 lb (66.2 kg)  BMI 26.7 kg/m2  Body mass index is 26.7 kg/(m^2).  Gen: no apparent distress  NECK: no adenopathy, no asymmetry, no masses  Chest: clear to auscultation without wheeze, rale or rhonchi  Cor: regular rate and rhythm without murmur  ABDOMEN: soft, nontender, no masses and bowel sounds normal  Ext: warm and dry without edema  Psych: Alert and oriented times 3; coherent speech, normal   rate and volume, able to articulate logical thoughts, able   to abstract reason, no tangential thoughts, no hallucinations   or delusions  Her affect is flat to tearful.       ASSESSMENT/PLAN:                                                      1. Moderate recurrent major depression (H)  PHQ-9 and MELSISA reviewed, she scores low, but has many complaints.  She has thoughts of being better off dead, but has no suicide plan or thoughts of harming herself and contracts for safety.  She feels her  treats her like a child, she wonders if she should stay  to him, but adamantly denies that she would ever consider divorce.  She feels spending time with a grandson makes her happy, but then is tearful that her granddaughter prefers to spend time with her aunt instead of [place].  She likes gardening, but then feels overwhelmed with how big her yard is.  I highly encouraged counseling as she doesn't seem to cope well with these  situational stressors.  Will also increase her Effexor and have her follow up in 1 month.    - DEPRESSION ACTION PLAN (DAP)  - MENTAL HEALTH REFERRAL  - venlafaxine (EFFEXOR-XR) 75 MG 24 hr capsule; Take 3 capsules (225 mg) by mouth daily  Dispense: 90 capsule; Refill: 1    2. Migraine with aura and without status migrainosus, not intractable  Migraines are infrequent, but she talked to someone who thought she should try a triptan instead of prn NSAID and she would like to try this, will try low dose Imitrex.  Drugs of the 'triptan' class are very effective for migraine treatment but can have significant side effects which are explained carefully to her. Discussed side effects such as transient chest or neck pain may occur, nausea, diarrhea, tingling, flushing are common. Re-dosing, if required, should be done once after a 2-hour interval.     - SUMAtriptan (IMITREX) 25 MG tablet; Take 1-2 tablets (25-50 mg) by mouth at onset of headache for migraine May repeat in 2 hours. Max 8 tablets/24 hours.  Dispense: 9 tablet; Refill: 1      Jacqueline Brandt MD  [facility]

## 2017-05-16 ENCOUNTER — OFFICE VISIT (OUTPATIENT)
Dept: FAMILY MEDICINE | Facility: OTHER | Age: 82
End: 2017-05-16
Payer: COMMERCIAL

## 2017-05-16 VITALS
TEMPERATURE: 99.1 F | WEIGHT: 146 LBS | DIASTOLIC BLOOD PRESSURE: 74 MMHG | BODY MASS INDEX: 26.7 KG/M2 | HEART RATE: 76 BPM | SYSTOLIC BLOOD PRESSURE: 136 MMHG | RESPIRATION RATE: 16 BRPM

## 2017-05-16 DIAGNOSIS — F33.1 MODERATE RECURRENT MAJOR DEPRESSION (H): Primary | ICD-10-CM

## 2017-05-16 DIAGNOSIS — G43.109 MIGRAINE WITH AURA AND WITHOUT STATUS MIGRAINOSUS, NOT INTRACTABLE: ICD-10-CM

## 2017-05-16 PROCEDURE — 99214 OFFICE O/P EST MOD 30 MIN: CPT | Performed by: FAMILY MEDICINE

## 2017-05-16 RX ORDER — SUMATRIPTAN 25 MG/1
25-50 TABLET, FILM COATED ORAL
Qty: 9 TABLET | Refills: 1 | Status: SHIPPED | OUTPATIENT
Start: 2017-05-16 | End: 2021-11-26

## 2017-05-16 RX ORDER — VENLAFAXINE HYDROCHLORIDE 75 MG/1
225 CAPSULE, EXTENDED RELEASE ORAL DAILY
Qty: 90 CAPSULE | Refills: 1 | Status: SHIPPED | OUTPATIENT
Start: 2017-05-16 | End: 2017-06-13

## 2017-05-16 ASSESSMENT — PATIENT HEALTH QUESTIONNAIRE - PHQ9
SUM OF ALL RESPONSES TO PHQ QUESTIONS 1-9: 4
10. IF YOU CHECKED OFF ANY PROBLEMS, HOW DIFFICULT HAVE THESE PROBLEMS MADE IT FOR YOU TO DO YOUR WORK, TAKE CARE OF THINGS AT HOME, OR GET ALONG WITH OTHER PEOPLE: SOMEWHAT DIFFICULT

## 2017-05-16 NOTE — MR AVS SNAPSHOT
After Visit Summary   5/16/2017    Erwin Tolliver    MRN: [id number]           Patient Information     Date Of Birth          1935        Visit Information        Provider Department      5/16/2017 4:20 PM Jacqueline Brandt MD [facility]        Today's Diagnoses     Moderate recurrent major depression (H)    -  1    Migraine with aura and without status migrainosus, not intractable           Follow-ups after your visit        Additional Services     MENTAL HEALTH REFERRAL       Your provider has referred you to: [facility]: [facility] - Counseling (Individual/Couples/Family) - [facility] [phone]   [email or web address]   *Please call to schedule an appointment.    All scheduling is subject to the client's specific insurance plan & benefits, provider/location availability, and provider clinical specialities.  Please arrive 15 minutes early for your first appointment and bring your completed paperwork.    Please be aware that coverage of these services is subject to the terms and limitations of your health insurance plan.  Call member services at your health plan with any benefit or coverage questions.                  Your next 10 appointments already scheduled     Jun 13, 2017  1:00 PM CDT   Pre-Op physical with Jacqueline Brandt MD   [facility] ([facility]    [address]  [place] [address]   [phone]            Jun 20, 2017   Procedure with Edwin Cox MD   [facility] ([facility])    [address] [place] [address]   [phone]           From [address]: Exit at [address] on [place] side of [place]. Turn right on [address]. Turn left at stoplight on [address]. [facility] will be in view two blocks ahead            Jun 26, 2017  9:00 AM CDT   Return Visit  with Edwin Cox MD   [facility]    [address] [address] [phone]              Who to contact     If you have questions or need follow up information about today's clinic visit or your schedule please contact [facility] [place] directly at [phone].  Normal or non-critical lab and imaging results will be communicated to you by MyChart, letter or phone within 4 business days after the clinic has received the results. If you do not hear from us within 7 days, please contact the clinic through [employer] or phone. If you have a critical or abnormal lab result, we will notify you by phone as soon as possible.  Submit refill requests through [employer] [facility] or call your pharmacy and they will forward the refill request to us. Please allow 3 business days for your refill to be completed.          Additional Information About Your Visit        [employer] Information     [employer] [facility] gives you secure access to your electronic health record. If you see a primary care provider, you can also send messages to your care team and make appointments. If you have questions, please call your primary care clinic.  If you do not have a primary care provider, please call [phone] and they will assist you.        Care EveryWhere ID     This is your Care EveryWhere ID. This could be used by other organizations to access your [facility] medical records  [id number]        Your Vitals Were     Pulse Temperature Respirations BMI (Body Mass Index)          76 99.1  F (37.3  C) (Temporal) 16 26.7 kg/m2         Blood Pressure from Last 3 Encounters:   05/16/17 136/74   03/01/17 146/78   01/31/17 140/80    Weight from Last 3 Encounters:   05/16/17 146 lb (66.2 kg)   04/12/17 144 lb (65.3 kg)   03/01/17 148 lb (67.1 kg)              We Performed the Following     DEPRESSION ACTION PLAN (DAP)     MENTAL HEALTH REFERRAL          Today's Medication Changes          These  changes are accurate as of: 5/16/17  4:59 PM.  If you have any questions, ask your nurse or doctor.               Start taking these medicines.        Dose/Directions    SUMAtriptan 25 MG tablet   Commonly known as:  IMITREX   Used for:  Migraine with aura and without status migrainosus, not intractable   Started by:  Jacqueline Brandt MD        Dose:  25-50 mg   Take 1-2 tablets (25-50 mg) by mouth at onset of headache for migraine May repeat in 2 hours. Max 8 tablets/24 hours.   Quantity:  9 tablet   Refills:  1         These medicines have changed or have updated prescriptions.        Dose/Directions    venlafaxine 75 MG 24 hr capsule   Commonly known as:  EFFEXOR-XR   This may have changed:    - medication strength  - how much to take   Used for:  Moderate recurrent major depression (H)   Changed by:  Jacqueline Brandt MD        Dose:  225 mg   Take 3 capsules (225 mg) by mouth daily   Quantity:  90 capsule   Refills:  1         Stop taking these medicines if you haven't already. Please contact your care team if you have questions.     meloxicam 7.5 MG tablet   Commonly known as:  MOBIC   Stopped by:  Jacqueline Brandt MD                Where to get your medicines      These medications were sent to [facility] [address]  [address] [address]     Phone:  [phone]     SUMAtriptan 25 MG tablet    venlafaxine 75 MG 24 hr capsule                Primary Care Provider Office Phone # Fax #    Jacqueline Brandt [phone]       [facility] [address]  [facility] [address]        Thank you!     Thank you for choosing [facility]  for your care. Our goal is always to provide you with excellent care. Hearing back from our patients is one way we can continue to improve our services. Please take a few minutes to complete the written survey that you may receive in the mail after your visit with us.  Thank you!             Your Updated Medication List - Protect others around you: Learn how to safely use, store and throw away your medicines at www.disposemymeds.org.          This list is accurate as of: 5/16/17  4:59 PM.  Always use your most recent med list.                   Brand Name Dispense Instructions for use    ADVIL COLD/SINUS PO          buPROPion 300 MG 24 hr tablet    WELLBUTRIN XL    90 tablet    Take 1 tablet (300 mg) by mouth every morning       ipratropium 0.03 % spray    ATROVENT    1 Box    Spray 2 sprays into both nostrils 3 times daily       levothyroxine 88 MCG tablet    SYNTHROID/LEVOTHROID    90 tablet    Take 1 tablet (88 mcg) by mouth daily       metoprolol 100 MG 24 hr tablet    TOPROL-XL    90 tablet    Take 1 tablet (100 mg) by mouth daily       naproxen 500 MG tablet    NAPROSYN    180 tablet    Take 1 tablet (500 mg) by mouth 2 times daily as needed for moderate pain       omeprazole 20 MG CR capsule    priLOSEC    45 capsule    TAKE ONE CAPSULE BY MOUTH EVERY DAY AS NEEDED       simvastatin 20 MG tablet    ZOCOR    90 tablet    TAKE ONE TABLET BY MOUTH AT BEDTIME       SUMAtriptan 25 MG tablet    IMITREX    9 tablet    Take 1-2 tablets (25-50 mg) by mouth at onset of headache for migraine May repeat in 2 hours. Max 8 tablets/24 hours.       traZODone 50 MG tablet    DESYREL    270 tablet    TAKE 2-3 TABLETS BY MOUTH NIGHTLY AS NEEDED FOR SLEEP       venlafaxine 75 MG 24 hr capsule    EFFEXOR-XR    90 capsule    Take 3 capsules (225 mg) by mouth daily

## 2017-05-16 NOTE — NURSING NOTE
"Chief Complaint   Patient presents with     Depression     consult     Panel Management     fall risk, dap, phq9, microalbumin       Initial /74  Pulse 76  Temp 99.1  F (37.3  C) (Temporal)  Resp 16  Wt 146 lb (66.2 kg)  BMI 26.7 kg/m2 Estimated body mass index is 26.7 kg/(m^2) as calculated from the following:    Height as of 3/1/17: 5' 2\" (1.575 m).    Weight as of this encounter: 146 lb (66.2 kg).  Medication Reconciliation: complete   Alicja Grove, RIMMA    "

## 2017-05-17 ASSESSMENT — PATIENT HEALTH QUESTIONNAIRE - PHQ9: SUM OF ALL RESPONSES TO PHQ QUESTIONS 1-9: 4

## 2017-06-08 NOTE — PROGRESS NOTES
[address]  [facility] [address]  [phone]  Dept: [phone]    PRE-OP EVALUATION:  Today's date: 2017    Erwin Tolliver (: 1935) presents for pre-operative evaluation assessment as requested by Dr. Cox.  She requires evaluation and anesthesia risk assessment prior to undergoing surgery/procedure for treatment of frequent sinus infections .  Proposed procedure: septoplasty    Date of Surgery/ Procedure: 17  Time of Surgery/ Procedure: 1:45pm  Hospital/Surgical Facility: [facility]  Fax number for surgical facility:   Primary Physician: Jacqueline Brandt  Type of Anesthesia Anticipated: to be determined    Patient has a Health Care Directive or Living Will:  NO    1. NO - Do you have a history of heart attack, stroke, stent, bypass or surgery on an artery in the head, neck, heart or legs?  2. NO - Do you ever have any pain or discomfort in your chest?  3. NO - Do you have a history of  Heart Failure?  4. NO - Are you troubled by shortness of breath when: walking on the level, up a slight hill or at night?  5. NO - Do you currently have a cold, bronchitis or other respiratory infection?  6. NO - Do you have a cough, shortness of breath or wheezing?  7. NO - Do you sometimes get pains in the calves of your legs when you walk?  8. NO - Do you or anyone in your family have previous history of blood clots?  9. NO - Do you or does anyone in your family have a serious bleeding problem such as prolonged bleeding following surgeries or cuts?  10. NO - Have you ever had problems with anemia or been told to take iron pills?  11. NO - Have you had any abnormal blood loss such as black, tarry or bloody stools, or abnormal vaginal bleeding?  12. NO - Have you ever had a blood transfusion?  13. NO - Have you or any of your relatives ever had problems with anesthesia?  14. NO - Do you have sleep apnea, excessive snoring or daytime  drowsiness?  15. NO - Do you have any prosthetic heart valves?  16. NO - Do you have prosthetic joints?  17. NO - Is there any chance that you may be pregnant?      HPI:                                                      Brief HPI related to upcoming procedure: Recurrent sinus infections with deviated septum.      HYPERTENSION - Patient has longstanding history of mod-severe HTN , currently denies any symptoms referable to elevated blood pressure. Specifically denies chest pain, palpitations, dyspnea, orthopnea, PND or peripheral edema. Blood pressure readings have been in normal range. Current medication regimen is as listed below. Patient denies any side effects of medication.                                                                                                                                                                                          .  DEPRESSION - Patient has a long history of Depression of moderate severity requiring medication for control with recent symptoms being gradually improving.                                                                                                                                                                                  HYPOTHYROIDISM - Patient has a longstanding history of chronic Hypothyroidism. Patient has been doing well, noting no tremor, insomnia, hair loss or changes in skin texture. Continues to take medications as directed, without adverse reactions or side effects.                                                                                                                                                                                                                        .    MEDICAL HISTORY:                                                      Patient Active Problem List    Diagnosis Date Noted     Seasonal allergic rhinitis due to other allergic trigger 03/01/2017     Priority: Medium     Other chronic sinusitis 03/01/2017      Priority: Medium     Penicillin allergy 03/01/2017     Priority: Medium     Epistaxis 03/01/2017     Priority: Medium     Hypertension, goal below 150/90 05/05/2015     Priority: Medium     Macular degeneration (senile) of retina 04/04/2014     Priority: Medium     Advanced directives, counseling/discussion 10/01/2013     Priority: Medium     Moderate recurrent major depression (H) 10/01/2013     Priority: Medium     SVT (supraventricular tachycardia) (H) 10/08/2012     Priority: Medium     CKD (chronic kidney disease) stage 3, GFR 30-59 ml/min 08/15/2012     Priority: Medium     Atopic rhinitis 01/09/2012     Priority: Medium     (Problem list name updated by automated process. Provider to review and confirm.)       GERD (gastroesophageal reflux disease) 08/02/2011     Priority: Medium     Osteopenia 11/15/2010     Priority: Medium     Hyperlipidemia, unspecified 10/31/2010     Priority: Medium     Hypothyroidism 12/14/2006     Priority: Medium     Problem list name updated by automated process. Provider to review       Migraine with aura      Priority: Medium     Problem list name updated by automated process. Provider to review        Past Medical History:   Diagnosis Date     Arthritis      Depressive disorder      Depressive disorder, not elsewhere classified      Hepatitis, unspecified 1993    [facility]      Migraine with aura, without mention of intractable migraine without mention of status migrainosus      Need for prophylactic hormone replacement therapy (postmenopausal)      Palpitations      Thyroid disease      Unspecified essential hypertension      Past Surgical History:   Procedure Laterality Date     APPENDECTOMY       BACK SURGERY      bulged disc     C TOTAL ABDOM HYSTERECTOMY      Hysterectomy, Total Abdominal and BSO -benign - age 41     COLONOSCOPY       ENT SURGERY       EYE SURGERY       HC COLONOSCOPY W BIOPSY  11/07/06      KNEE SCOPE, DIAGNOSTIC      Arthroscopy, Knee      HC REDUCTION OF LARGE BREAST       ORTHOPEDIC SURGERY       Current Outpatient Prescriptions   Medication Sig Dispense Refill     venlafaxine (EFFEXOR-XR) 75 MG 24 hr capsule Take 3 capsules (225 mg) by mouth daily 90 capsule 1     SUMAtriptan (IMITREX) 25 MG tablet Take 1-2 tablets (25-50 mg) by mouth at onset of headache for migraine May repeat in 2 hours. Max 8 tablets/24 hours. 9 tablet 1     ipratropium (ATROVENT) 0.03 % spray Spray 2 sprays into both nostrils 3 times daily 1 Box 3     metoprolol (TOPROL-XL) 100 MG 24 hr tablet Take 1 tablet (100 mg) by mouth daily 90 tablet 3     omeprazole (PRILOSEC) 20 MG CR capsule TAKE ONE CAPSULE BY MOUTH EVERY DAY AS NEEDED 45 capsule 3     simvastatin (ZOCOR) 20 MG tablet TAKE ONE TABLET BY MOUTH AT BEDTIME 90 tablet 3     levothyroxine (SYNTHROID/LEVOTHROID) 88 MCG tablet Take 1 tablet (88 mcg) by mouth daily 90 tablet 3     buPROPion (WELLBUTRIN XL) 300 MG 24 hr tablet Take 1 tablet (300 mg) by mouth every morning 90 tablet 3     traZODone (DESYREL) 50 MG tablet TAKE 2-3 TABLETS BY MOUTH NIGHTLY AS NEEDED FOR SLEEP 270 tablet 3     naproxen (NAPROSYN) 500 MG tablet Take 1 tablet (500 mg) by mouth 2 times daily as needed for moderate pain 180 tablet 0     Pseudoephedrine-Ibuprofen (ADVIL COLD/SINUS PO)        OTC products: None, except as noted above    Allergies   Allergen Reactions     Aspirin      headaches,tremors,nausea     Augmentin [Amoxicillin-Pot Clavulanate] Nausea and Vomiting      Latex Allergy: NO    Social History   Substance Use Topics     Smoking status: Former Smoker     Packs/day: 0.50     Years: 10.00     Quit date: 1/1/1966     Smokeless tobacco: Never Used      Comment: no smokers in household     Alcohol use 1.0 oz/week      Comment: beerx2/ x8per month     History   Drug Use No       REVIEW OF SYSTEMS:                                                    C: NEGATIVE for fever, chills, change in weight  I: NEGATIVE for worrisome rashes, moles or  lesions  E: NEGATIVE for vision changes or irritation  ENT/MOUTH:  Recurrent sinus infections  R: NEGATIVE for significant cough or SOB  B: NEGATIVE for masses, tenderness or discharge  CV: NEGATIVE for chest pain, palpitations or peripheral edema  GI: NEGATIVE for nausea, abdominal pain, heartburn, or change in bowel habits  : NEGATIVE for frequency, dysuria, or hematuria  M: NEGATIVE for significant arthralgias or myalgia  N: NEGATIVE for weakness, dizziness or paresthesias  E: NEGATIVE for temperature intolerance, skin/hair changes  H: NEGATIVE for bleeding problems  P: NEGATIVE for changes in mood or affect    EXAM:                                                    /70  Pulse 72  Temp 98.8  F (37.1  C) (Temporal)  Resp 12  Wt 144 lb (65.3 kg)  Breastfeeding? No  BMI 26.34 kg/m2    GENERAL APPEARANCE: healthy, alert and no distress     EYES: EOMI, PERRL     HENT: ear canals and TM's normal and nose and mouth without ulcers or lesions     NECK: no adenopathy, no asymmetry, masses, or scars and thyroid normal to palpation     RESP: lungs clear to auscultation - no rales, rhonchi or wheezes     CV: regular rates and rhythm, normal S1 S2, no S3 or S4 and no murmur, click or rub     ABDOMEN:  soft, nontender, no HSM or masses and bowel sounds normal     MS: extremities normal- no gross deformities noted, no evidence of inflammation in joints, FROM in all extremities.     SKIN: no suspicious lesions or rashes     NEURO: Normal strength and tone, sensory exam grossly normal, mentation intact and speech normal     PSYCH: mentation appears normal. and affect normal/bright     LYMPHATICS: No axillary, cervical, or supraclavicular nodes    DIAGNOSTICS:                                                    EKG: Normal Sinus Rhythm, normal axis, normal intervals, no acute ST/T changes c/w ischemia, no LVH by voltage criteria, unchanged from previous tracings    Recent Labs   Lab Test  01/31/17   1007  04/19/16    0956   06/22/11   1050   HGB  13.0  12.4   < >  12.5   PLT  247  226   < >  222   INR   --    --    --   0.90   NA  139  140   < >   --    POTASSIUM  4.0  3.7   < >   --    CR  0.95  0.90   < >   --     < > = values in this interval not displayed.      IMPRESSION:                                                    Reason for surgery/procedure: recurrent sinusitis    The proposed surgical procedure is considered LOW risk.    REVISED CARDIAC RISK INDEX  The patient has the following serious cardiovascular risks for perioperative complications such as (MI, PE, VFib and 3  AV Block):  No serious cardiac risks  INTERPRETATION: 0 risks: Class I (very low risk - 0.4% complication rate)    The patient has the following additional risks for perioperative complications:  No identified additional risks      ICD-10-CM    1. Preop general physical exam Z01.818 EKG 12-lead complete w/read - Clinics   2. Deviated nasal septum J34.2    3. Other chronic sinusitis J32.8    4. Moderate recurrent major depression (H) F33.1        RECOMMENDATIONS:                                                        Cardiovascular Risk  Patient is already on a Beta Blocker. Continue Betablocker therapy after surgery, using Beta blocker order set as necessary for NPO status.      --Patient is to take all scheduled medications on the day of surgery EXCEPT for modifications listed below.    APPROVAL GIVEN to proceed with proposed procedure, without further diagnostic evaluation       Signed Electronically by: Jacqueline Brandt MD    Copy of this evaluation report is provided to requesting physician.    [facility] Preop Guidelines

## 2017-06-13 ENCOUNTER — OFFICE VISIT (OUTPATIENT)
Dept: FAMILY MEDICINE | Facility: OTHER | Age: 82
End: 2017-06-13
Payer: COMMERCIAL

## 2017-06-13 VITALS
SYSTOLIC BLOOD PRESSURE: 130 MMHG | WEIGHT: 144 LBS | BODY MASS INDEX: 26.34 KG/M2 | DIASTOLIC BLOOD PRESSURE: 70 MMHG | TEMPERATURE: 98.8 F | RESPIRATION RATE: 12 BRPM | HEART RATE: 72 BPM

## 2017-06-13 DIAGNOSIS — F33.1 MODERATE RECURRENT MAJOR DEPRESSION (H): ICD-10-CM

## 2017-06-13 DIAGNOSIS — J34.2 DEVIATED NASAL SEPTUM: ICD-10-CM

## 2017-06-13 DIAGNOSIS — Z01.818 PREOP GENERAL PHYSICAL EXAM: Primary | ICD-10-CM

## 2017-06-13 DIAGNOSIS — J32.8 OTHER CHRONIC SINUSITIS: ICD-10-CM

## 2017-06-13 PROCEDURE — 93000 ELECTROCARDIOGRAM COMPLETE: CPT | Performed by: FAMILY MEDICINE

## 2017-06-13 PROCEDURE — 99214 OFFICE O/P EST MOD 30 MIN: CPT | Performed by: FAMILY MEDICINE

## 2017-06-13 RX ORDER — VENLAFAXINE HYDROCHLORIDE 75 MG/1
225 CAPSULE, EXTENDED RELEASE ORAL DAILY
Qty: 270 CAPSULE | Refills: 1 | Status: SHIPPED | OUTPATIENT
Start: 2017-06-13 | End: 2017-11-24

## 2017-06-13 NOTE — NURSING NOTE
"Chief Complaint   Patient presents with     Pre-Op Exam     Panel Management     phq9. microalbumin       Initial /70  Pulse 72  Temp 98.8  F (37.1  C) (Temporal)  Resp 12  Wt 144 lb (65.3 kg)  Breastfeeding? No  BMI 26.34 kg/m2 Estimated body mass index is 26.34 kg/(m^2) as calculated from the following:    Height as of 3/1/17: 5' 2\" (1.575 m).    Weight as of this encounter: 144 lb (65.3 kg).  Medication Reconciliation: complete   Alicja Grove CMA    "

## 2017-06-13 NOTE — MR AVS SNAPSHOT
After Visit Summary   6/13/2017    Erwin Tolliver    MRN: [id number]           Patient Information     Date Of Birth          1935        Visit Information        Provider Department      6/13/2017 1:00 PM Jacqueline Brandt MD [facility]        Today's Diagnoses     Preop general physical exam    -  1    Deviated nasal septum        Other chronic sinusitis        Moderate recurrent major depression (H)          Care Instructions      Before Your Surgery      Call your surgeon if there is any change in your health. This includes signs of a cold or flu (such as a sore throat, runny nose, cough, rash or fever).    Do not smoke, drink alcohol or take over the counter medicine (unless your surgeon or primary care doctor tells you to) for the 24 hours before and after surgery.    If you take prescribed drugs: Follow your doctor s orders about which medicines to take and which to stop until after surgery.    Eating and drinking prior to surgery: follow the instructions from your surgeon    Take a shower or bath the night before surgery. Use the soap your surgeon gave you to gently clean your skin. If you do not have soap from your surgeon, use your regular soap. Do not shave or scrub the surgery site.  Wear clean pajamas and have clean sheets on your bed.           Follow-ups after your visit        Your next 10 appointments already scheduled     Jun 20, 2017   Procedure with Edwin Cox MD   [facility] ([facility])    [address] [place] [address]   [phone]           From [address]: Exit at [address] on south side of [place]. Turn right on [address]. Turn left at stoplight on [address]. [facility] will be in view two blocks ahead            Jun 26, 2017  9:00 AM CDT   [facility]    [address]  [place]  [facility] [address] [phone]              Who to contact     If you have questions or need follow up information about today's clinic visit or your schedule please contact [facility] [place] directly at [phone].  Normal or non-critical lab and imaging results will be communicated to you by MyChart, letter or phone within 4 business days after the clinic has received the results. If you do not hear from us within 7 days, please contact the clinic through [email or web address] or phone. If you have a critical or abnormal lab result, we will notify you by phone as soon as possible.  Submit refill requests through [employer] or call your pharmacy and they will forward the refill request to us. Please allow 3 business days for your refill to be completed.          Additional Information About Your Visit        [email or web address] Information     [employer] gives you secure access to your electronic health record. If you see a primary care provider, you can also send messages to your care team and make appointments. If you have questions, please call your primary care clinic.  If you do not have a primary care provider, please call [phone] and they will assist you.        Care EveryWhere ID     This is your Care EveryWhere ID. This could be used by other organizations to access your [place] medical records  [id number]        Your Vitals Were     Pulse Temperature Respirations Breastfeeding? BMI (Body Mass Index)       72 98.8  F (37.1  C) (Temporal) 12 No 26.34 kg/m2        Blood Pressure from Last 3 Encounters:   06/13/17 130/70   05/16/17 136/74   03/01/17 146/78    Weight from Last 3 Encounters:   06/13/17 144 lb (65.3 kg)   05/16/17 146 lb (66.2 kg)   04/12/17 144 lb (65.3 kg)              We Performed the Following     EKG 12-lead complete w/read - Clinics          Today's Medication Changes          These changes are accurate as of: 6/13/17  1:41 PM.  If you have any questions, ask your nurse or doctor.                Stop taking these medicines if you haven't already. Please contact your care team if you have questions.     naproxen 500 MG tablet   Commonly known as:  NAPROSYN   Stopped by:  Jacqueline Brandt MD                Where to get your medicines      These medications were sent to [facility] [place], [place] - [address]  [address], [facility] [address]     Phone:  [phone]     venlafaxine 75 MG 24 hr capsule                Primary Care Provider Office Phone # Fax #    Jacqueline Brandt [phone]       [facility] [place] [address]  [facility] [address]        Thank you!     Thank you for choosing [facility]  for your care. Our goal is always to provide you with excellent care. Hearing back from our patients is one way we can continue to improve our services. Please take a few minutes to complete the written survey that you may receive in the mail after your visit with us. Thank you!             Your Updated Medication List - Protect others around you: Learn how to safely use, store and throw away your medicines at www.disposemymeds.org.          This list is accurate as of: 6/13/17  1:41 PM.  Always use your most recent med list.                   Brand Name Dispense Instructions for use    ADVIL COLD/SINUS PO          buPROPion 300 MG 24 hr tablet    WELLBUTRIN XL    90 tablet    Take 1 tablet (300 mg) by mouth every morning       ipratropium 0.03 % spray    ATROVENT    1 Box    Spray 2 sprays into both nostrils 3 times daily       levothyroxine 88 MCG tablet    SYNTHROID/LEVOTHROID    90 tablet    Take 1 tablet (88 mcg) by mouth daily       metoprolol 100 MG 24 hr tablet    TOPROL-XL    90 tablet    Take 1 tablet (100 mg) by mouth daily       omeprazole 20 MG CR capsule    priLOSEC    45 capsule    TAKE ONE CAPSULE BY MOUTH EVERY DAY AS NEEDED       simvastatin 20 MG tablet    ZOCOR    90 tablet    TAKE ONE TABLET BY MOUTH AT  BEDTIME       SUMAtriptan 25 MG tablet    IMITREX    9 tablet    Take 1-2 tablets (25-50 mg) by mouth at onset of headache for migraine May repeat in 2 hours. Max 8 tablets/24 hours.       traZODone 50 MG tablet    DESYREL    270 tablet    TAKE 2-3 TABLETS BY MOUTH NIGHTLY AS NEEDED FOR SLEEP       venlafaxine 75 MG 24 hr capsule    EFFEXOR-XR    270 capsule    Take 3 capsules (225 mg) by mouth daily

## 2017-06-14 ASSESSMENT — ANXIETY QUESTIONNAIRES
GAD7 TOTAL SCORE: 5
2. NOT BEING ABLE TO STOP OR CONTROL WORRYING: SEVERAL DAYS
7. FEELING AFRAID AS IF SOMETHING AWFUL MIGHT HAPPEN: NOT AT ALL
IF YOU CHECKED OFF ANY PROBLEMS ON THIS QUESTIONNAIRE, HOW DIFFICULT HAVE THESE PROBLEMS MADE IT FOR YOU TO DO YOUR WORK, TAKE CARE OF THINGS AT HOME, OR GET ALONG WITH OTHER PEOPLE: SOMEWHAT DIFFICULT
1. FEELING NERVOUS, ANXIOUS, OR ON EDGE: SEVERAL DAYS
5. BEING SO RESTLESS THAT IT IS HARD TO SIT STILL: NOT AT ALL
6. BECOMING EASILY ANNOYED OR IRRITABLE: SEVERAL DAYS
3. WORRYING TOO MUCH ABOUT DIFFERENT THINGS: SEVERAL DAYS

## 2017-06-14 ASSESSMENT — PATIENT HEALTH QUESTIONNAIRE - PHQ9: 5. POOR APPETITE OR OVEREATING: SEVERAL DAYS

## 2017-06-15 ASSESSMENT — ANXIETY QUESTIONNAIRES: GAD7 TOTAL SCORE: 5

## 2017-06-15 ASSESSMENT — PATIENT HEALTH QUESTIONNAIRE - PHQ9: SUM OF ALL RESPONSES TO PHQ QUESTIONS 1-9: 2

## 2017-06-19 NOTE — H&P (VIEW-ONLY)
[address]  [facility] [address]  [phone]  Dept: [phone]    PRE-OP EVALUATION:  Today's date: 2017    Erwin Tolliver (: 1935) presents for pre-operative evaluation assessment as requested by Dr. Cox.  She requires evaluation and anesthesia risk assessment prior to undergoing surgery/procedure for treatment of frequent sinus infections .  Proposed procedure: septoplasty    Date of Surgery/ Procedure: 17  Time of Surgery/ Procedure: 1:45pm  Hospital/Surgical Facility: [facility]  Fax number for surgical facility:   Primary Physician: Jacqueline Brandt  Type of Anesthesia Anticipated: to be determined    Patient has a Health Care Directive or Living Will:  NO    1. NO - Do you have a history of heart attack, stroke, stent, bypass or surgery on an artery in the head, neck, heart or legs?  2. NO - Do you ever have any pain or discomfort in your chest?  3. NO - Do you have a history of  Heart Failure?  4. NO - Are you troubled by shortness of breath when: walking on the level, up a slight hill or at night?  5. NO - Do you currently have a cold, bronchitis or other respiratory infection?  6. NO - Do you have a cough, shortness of breath or wheezing?  7. NO - Do you sometimes get pains in the calves of your legs when you walk?  8. NO - Do you or anyone in your family have previous history of blood clots?  9. NO - Do you or does anyone in your family have a serious bleeding problem such as prolonged bleeding following surgeries or cuts?  10. NO - Have you ever had problems with anemia or been told to take iron pills?  11. NO - Have you had any abnormal blood loss such as black, tarry or bloody stools, or abnormal vaginal bleeding?  12. NO - Have you ever had a blood transfusion?  13. NO - Have you or any of your relatives ever had problems with anesthesia?  14. NO - Do you have sleep apnea, excessive snoring or daytime  drowsiness?  15. NO - Do you have any prosthetic heart valves?  16. NO - Do you have prosthetic joints?  17. NO - Is there any chance that you may be pregnant?      HPI:                                                      Brief HPI related to upcoming procedure: Recurrent sinus infections with deviated septum.      HYPERTENSION - Patient has longstanding history of mod-severe HTN , currently denies any symptoms referable to elevated blood pressure. Specifically denies chest pain, palpitations, dyspnea, orthopnea, PND or peripheral edema. Blood pressure readings have been in normal range. Current medication regimen is as listed below. Patient denies any side effects of medication.                                                                                                                                                                                          .  DEPRESSION - Patient has a long history of Depression of moderate severity requiring medication for control with recent symptoms being gradually improving.                                                                                                                                                                                  HYPOTHYROIDISM - Patient has a longstanding history of chronic Hypothyroidism. Patient has been doing well, noting no tremor, insomnia, hair loss or changes in skin texture. Continues to take medications as directed, without adverse reactions or side effects.                                                                                                                                                                                                                        .    MEDICAL HISTORY:                                                      Patient Active Problem List    Diagnosis Date Noted     Seasonal allergic rhinitis due to other allergic trigger 03/01/2017     Priority: Medium     Other chronic sinusitis 03/01/2017      Priority: Medium     Penicillin allergy 03/01/2017     Priority: Medium     Epistaxis 03/01/2017     Priority: Medium     Hypertension, goal below 150/90 05/05/2015     Priority: Medium     Macular degeneration (senile) of retina 04/04/2014     Priority: Medium     Advanced directives, counseling/discussion 10/01/2013     Priority: Medium     Moderate recurrent major depression (H) 10/01/2013     Priority: Medium     SVT (supraventricular tachycardia) (H) 10/08/2012     Priority: Medium     CKD (chronic kidney disease) stage 3, GFR 30-59 ml/min 08/15/2012     Priority: Medium     Atopic rhinitis 01/09/2012     Priority: Medium     (Problem list name updated by automated process. Provider to review and confirm.)       GERD (gastroesophageal reflux disease) 08/02/2011     Priority: Medium     Osteopenia 11/15/2010     Priority: Medium     Hyperlipidemia, unspecified 10/31/2010     Priority: Medium     Hypothyroidism 12/14/2006     Priority: Medium     Problem list name updated by automated process. Provider to review       Migraine with aura      Priority: Medium     Problem list name updated by automated process. Provider to review        Past Medical History:   Diagnosis Date     Arthritis      Depressive disorder      Depressive disorder, not elsewhere classified      Hepatitis, unspecified 1993    From [facility]      Migraine with aura, without mention of intractable migraine without mention of status migrainosus      Need for prophylactic hormone replacement therapy (postmenopausal)      Palpitations      Thyroid disease      Unspecified essential hypertension      Past Surgical History:   Procedure Laterality Date     APPENDECTOMY       BACK SURGERY      bulged disc     C TOTAL ABDOM HYSTERECTOMY      Hysterectomy, Total Abdominal and BSO -benign - age 41     COLONOSCOPY       ENT SURGERY       EYE SURGERY       HC COLONOSCOPY W BIOPSY  11/07/06      KNEE SCOPE, DIAGNOSTIC      Arthroscopy, Knee      HC REDUCTION OF LARGE BREAST       ORTHOPEDIC SURGERY       Current Outpatient Prescriptions   Medication Sig Dispense Refill     venlafaxine (EFFEXOR-XR) 75 MG 24 hr capsule Take 3 capsules (225 mg) by mouth daily 90 capsule 1     SUMAtriptan (IMITREX) 25 MG tablet Take 1-2 tablets (25-50 mg) by mouth at onset of headache for migraine May repeat in 2 hours. Max 8 tablets/24 hours. 9 tablet 1     ipratropium (ATROVENT) 0.03 % spray Spray 2 sprays into both nostrils 3 times daily 1 Box 3     metoprolol (TOPROL-XL) 100 MG 24 hr tablet Take 1 tablet (100 mg) by mouth daily 90 tablet 3     omeprazole (PRILOSEC) 20 MG CR capsule TAKE ONE CAPSULE BY MOUTH EVERY DAY AS NEEDED 45 capsule 3     simvastatin (ZOCOR) 20 MG tablet TAKE ONE TABLET BY MOUTH AT BEDTIME 90 tablet 3     levothyroxine (SYNTHROID/LEVOTHROID) 88 MCG tablet Take 1 tablet (88 mcg) by mouth daily 90 tablet 3     buPROPion (WELLBUTRIN XL) 300 MG 24 hr tablet Take 1 tablet (300 mg) by mouth every morning 90 tablet 3     traZODone (DESYREL) 50 MG tablet TAKE 2-3 TABLETS BY MOUTH NIGHTLY AS NEEDED FOR SLEEP 270 tablet 3     naproxen (NAPROSYN) 500 MG tablet Take 1 tablet (500 mg) by mouth 2 times daily as needed for moderate pain 180 tablet 0     Pseudoephedrine-Ibuprofen (ADVIL COLD/SINUS PO)        OTC products: None, except as noted above    Allergies   Allergen Reactions     Aspirin      headaches,tremors,nausea     Augmentin [Amoxicillin-Pot Clavulanate] Nausea and Vomiting      Latex Allergy: NO    Social History   Substance Use Topics     Smoking status: Former Smoker     Packs/day: 0.50     Years: 10.00     Quit date: 1/1/1966     Smokeless tobacco: Never Used      Comment: no smokers in household     Alcohol use 1.0 oz/week      Comment: beerx2/ x8per month     History   Drug Use No       REVIEW OF SYSTEMS:                                                    C: NEGATIVE for fever, chills, change in weight  I: NEGATIVE for worrisome rashes, moles or  lesions  E: NEGATIVE for vision changes or irritation  ENT/MOUTH:  Recurrent sinus infections  R: NEGATIVE for significant cough or SOB  B: NEGATIVE for masses, tenderness or discharge  CV: NEGATIVE for chest pain, palpitations or peripheral edema  GI: NEGATIVE for nausea, abdominal pain, heartburn, or change in bowel habits  : NEGATIVE for frequency, dysuria, or hematuria  M: NEGATIVE for significant arthralgias or myalgia  N: NEGATIVE for weakness, dizziness or paresthesias  E: NEGATIVE for temperature intolerance, skin/hair changes  H: NEGATIVE for bleeding problems  P: NEGATIVE for changes in mood or affect    EXAM:                                                    /70  Pulse 72  Temp 98.8  F (37.1  C) (Temporal)  Resp 12  Wt 144 lb (65.3 kg)  Breastfeeding? No  BMI 26.34 kg/m2    GENERAL APPEARANCE: healthy, alert and no distress     EYES: EOMI, PERRL     HENT: ear canals and TM's normal and nose and mouth without ulcers or lesions     NECK: no adenopathy, no asymmetry, masses, or scars and thyroid normal to palpation     RESP: lungs clear to auscultation - no rales, rhonchi or wheezes     CV: regular rates and rhythm, normal S1 S2, no S3 or S4 and no murmur, click or rub     ABDOMEN:  soft, nontender, no HSM or masses and bowel sounds normal     MS: extremities normal- no gross deformities noted, no evidence of inflammation in joints, FROM in all extremities.     SKIN: no suspicious lesions or rashes     NEURO: Normal strength and tone, sensory exam grossly normal, mentation intact and speech normal     PSYCH: mentation appears normal. and affect normal/bright     LYMPHATICS: No axillary, cervical, or supraclavicular nodes    DIAGNOSTICS:                                                    EKG: Normal Sinus Rhythm, normal axis, normal intervals, no acute ST/T changes c/w ischemia, no LVH by voltage criteria, unchanged from previous tracings    Recent Labs   Lab Test  01/31/17   1007  04/19/16    0956   06/22/11   1050   HGB  13.0  12.4   < >  12.5   PLT  247  226   < >  222   INR   --    --    --   0.90   NA  139  140   < >   --    POTASSIUM  4.0  3.7   < >   --    CR  0.95  0.90   < >   --     < > = values in this interval not displayed.      IMPRESSION:                                                    Reason for surgery/procedure: recurrent sinusitis    The proposed surgical procedure is considered LOW risk.    REVISED CARDIAC RISK INDEX  The patient has the following serious cardiovascular risks for perioperative complications such as (MI, PE, VFib and 3  AV Block):  No serious cardiac risks  INTERPRETATION: 0 risks: Class I (very low risk - 0.4% complication rate)    The patient has the following additional risks for perioperative complications:  No identified additional risks      ICD-10-CM    1. Preop general physical exam Z01.818 EKG 12-lead complete w/read - Clinics   2. Deviated nasal septum J34.2    3. Other chronic sinusitis J32.8    4. Moderate recurrent major depression (H) F33.1        RECOMMENDATIONS:                                                        Cardiovascular Risk  Patient is already on a Beta Blocker. Continue Betablocker therapy after surgery, using Beta blocker order set as necessary for NPO status.      --Patient is to take all scheduled medications on the day of surgery EXCEPT for modifications listed below.    APPROVAL GIVEN to proceed with proposed procedure, without further diagnostic evaluation       Signed Electronically by: Jacqueline Brandt MD    Copy of this evaluation report is provided to requesting physician.    [place] Preop Guidelines

## 2017-06-20 ENCOUNTER — HOSPITAL ENCOUNTER (OUTPATIENT)
Facility: CLINIC | Age: 82
Discharge: HOME | End: 2017-06-20
Attending: OTOLARYNGOLOGY | Admitting: OTOLARYNGOLOGY
Payer: MEDICARE

## 2017-06-20 ENCOUNTER — SURGERY (OUTPATIENT)
Age: 82
End: 2017-06-20

## 2017-06-20 ENCOUNTER — ANESTHESIA EVENT (OUTPATIENT)
Dept: SURGERY | Facility: CLINIC | Age: 82
End: 2017-06-20
Payer: MEDICARE

## 2017-06-20 ENCOUNTER — ANESTHESIA (OUTPATIENT)
Dept: SURGERY | Facility: CLINIC | Age: 82
End: 2017-06-20
Payer: MEDICARE

## 2017-06-20 VITALS
SYSTOLIC BLOOD PRESSURE: 138 MMHG | TEMPERATURE: 97.4 F | HEART RATE: 85 BPM | RESPIRATION RATE: 16 BRPM | DIASTOLIC BLOOD PRESSURE: 79 MMHG | OXYGEN SATURATION: 97 %

## 2017-06-20 PROCEDURE — 25000125 ZZHC RX 250: Performed by: OTOLARYNGOLOGY

## 2017-06-20 PROCEDURE — 30520 REPAIR OF NASAL SEPTUM: CPT | Performed by: OTOLARYNGOLOGY

## 2017-06-20 PROCEDURE — 36000056 ZZH SURGERY LEVEL 3 1ST 30 MIN: Performed by: OTOLARYNGOLOGY

## 2017-06-20 PROCEDURE — 25000564 ZZH DESFLURANE, EA 15 MIN: Performed by: OTOLARYNGOLOGY

## 2017-06-20 PROCEDURE — 71000027 ZZH RECOVERY PHASE 2 EACH 15 MINS: Performed by: OTOLARYNGOLOGY

## 2017-06-20 PROCEDURE — 25000125 ZZHC RX 250: Performed by: NURSE ANESTHETIST, CERTIFIED REGISTERED

## 2017-06-20 PROCEDURE — 40000306 ZZH STATISTIC PRE PROC ASSESS II: Performed by: OTOLARYNGOLOGY

## 2017-06-20 PROCEDURE — 25000128 H RX IP 250 OP 636: Performed by: NURSE ANESTHETIST, CERTIFIED REGISTERED

## 2017-06-20 PROCEDURE — 36000058 ZZH SURGERY LEVEL 3 EA 15 ADDTL MIN: Performed by: OTOLARYNGOLOGY

## 2017-06-20 PROCEDURE — 30140 RESECT INFERIOR TURBINATE: CPT | Mod: 50 | Performed by: OTOLARYNGOLOGY

## 2017-06-20 PROCEDURE — 37000009 ZZH ANESTHESIA TECHNICAL FEE, EACH ADDTL 15 MIN: Performed by: OTOLARYNGOLOGY

## 2017-06-20 PROCEDURE — 27210794 ZZH OR GENERAL SUPPLY STERILE: Performed by: OTOLARYNGOLOGY

## 2017-06-20 PROCEDURE — 37000008 ZZH ANESTHESIA TECHNICAL FEE, 1ST 30 MIN: Performed by: OTOLARYNGOLOGY

## 2017-06-20 PROCEDURE — A9270 NON-COVERED ITEM OR SERVICE: HCPCS | Mod: GY | Performed by: OTOLARYNGOLOGY

## 2017-06-20 PROCEDURE — 25000132 ZZH RX MED GY IP 250 OP 250 PS 637: Mod: GY | Performed by: OTOLARYNGOLOGY

## 2017-06-20 PROCEDURE — 71000014 ZZH RECOVERY PHASE 1 LEVEL 2 FIRST HR: Performed by: OTOLARYNGOLOGY

## 2017-06-20 RX ORDER — FENTANYL CITRATE 50 UG/ML
INJECTION, SOLUTION INTRAMUSCULAR; INTRAVENOUS PRN
Status: DISCONTINUED | OUTPATIENT
Start: 2017-06-20 | End: 2017-06-20

## 2017-06-20 RX ORDER — DIMENHYDRINATE 50 MG/ML
INJECTION, SOLUTION INTRAMUSCULAR; INTRAVENOUS PRN
Status: DISCONTINUED | OUTPATIENT
Start: 2017-06-20 | End: 2017-06-20

## 2017-06-20 RX ORDER — DIMENHYDRINATE 50 MG/ML
25 INJECTION, SOLUTION INTRAMUSCULAR; INTRAVENOUS
Status: DISCONTINUED | OUTPATIENT
Start: 2017-06-20 | End: 2017-06-20 | Stop reason: HOSPADM

## 2017-06-20 RX ORDER — DEXAMETHASONE SODIUM PHOSPHATE 10 MG/ML
10 INJECTION, SOLUTION INTRAMUSCULAR; INTRAVENOUS ONCE
Status: DISCONTINUED | OUTPATIENT
Start: 2017-06-20 | End: 2017-06-20 | Stop reason: HOSPADM

## 2017-06-20 RX ORDER — CEPHALEXIN 500 MG/1
500 CAPSULE ORAL 2 TIMES DAILY
Qty: 14 CAPSULE | Refills: 0 | Status: SHIPPED | OUTPATIENT
Start: 2017-06-20 | End: 2017-06-27

## 2017-06-20 RX ORDER — MEPERIDINE HYDROCHLORIDE 50 MG/ML
12.5 INJECTION INTRAMUSCULAR; INTRAVENOUS; SUBCUTANEOUS
Status: DISCONTINUED | OUTPATIENT
Start: 2017-06-20 | End: 2017-06-20 | Stop reason: HOSPADM

## 2017-06-20 RX ORDER — ONDANSETRON 2 MG/ML
4 INJECTION INTRAMUSCULAR; INTRAVENOUS EVERY 30 MIN PRN
Status: DISCONTINUED | OUTPATIENT
Start: 2017-06-20 | End: 2017-06-20 | Stop reason: HOSPADM

## 2017-06-20 RX ORDER — HYDRALAZINE HYDROCHLORIDE 20 MG/ML
2.5-5 INJECTION INTRAMUSCULAR; INTRAVENOUS EVERY 10 MIN PRN
Status: DISCONTINUED | OUTPATIENT
Start: 2017-06-20 | End: 2017-06-20 | Stop reason: HOSPADM

## 2017-06-20 RX ORDER — OXYCODONE AND ACETAMINOPHEN 5; 325 MG/1; MG/1
1-2 TABLET ORAL EVERY 4 HOURS PRN
Qty: 40 TABLET | Refills: 0 | Status: SHIPPED | OUTPATIENT
Start: 2017-06-20 | End: 2017-06-26

## 2017-06-20 RX ORDER — PROPOFOL 10 MG/ML
INJECTION, EMULSION INTRAVENOUS PRN
Status: DISCONTINUED | OUTPATIENT
Start: 2017-06-20 | End: 2017-06-20

## 2017-06-20 RX ORDER — ONDANSETRON 4 MG/1
4-8 TABLET, ORALLY DISINTEGRATING ORAL EVERY 8 HOURS PRN
Qty: 10 TABLET | Refills: 0 | Status: SHIPPED | OUTPATIENT
Start: 2017-06-20 | End: 2019-10-25

## 2017-06-20 RX ORDER — LIDOCAINE 40 MG/G
CREAM TOPICAL
Status: DISCONTINUED | OUTPATIENT
Start: 2017-06-20 | End: 2017-06-20 | Stop reason: HOSPADM

## 2017-06-20 RX ORDER — SODIUM CHLORIDE, SODIUM LACTATE, POTASSIUM CHLORIDE, CALCIUM CHLORIDE 600; 310; 30; 20 MG/100ML; MG/100ML; MG/100ML; MG/100ML
INJECTION, SOLUTION INTRAVENOUS CONTINUOUS
Status: DISCONTINUED | OUTPATIENT
Start: 2017-06-20 | End: 2017-06-20 | Stop reason: HOSPADM

## 2017-06-20 RX ORDER — LIDOCAINE HYDROCHLORIDE 20 MG/ML
INJECTION, SOLUTION INFILTRATION; PERINEURAL PRN
Status: DISCONTINUED | OUTPATIENT
Start: 2017-06-20 | End: 2017-06-20

## 2017-06-20 RX ORDER — FENTANYL CITRATE 50 UG/ML
25-50 INJECTION, SOLUTION INTRAMUSCULAR; INTRAVENOUS
Status: DISCONTINUED | OUTPATIENT
Start: 2017-06-20 | End: 2017-06-20 | Stop reason: HOSPADM

## 2017-06-20 RX ORDER — DEXAMETHASONE SODIUM PHOSPHATE 10 MG/ML
INJECTION, SOLUTION INTRAMUSCULAR; INTRAVENOUS PRN
Status: DISCONTINUED | OUTPATIENT
Start: 2017-06-20 | End: 2017-06-20

## 2017-06-20 RX ORDER — ONDANSETRON 2 MG/ML
INJECTION INTRAMUSCULAR; INTRAVENOUS PRN
Status: DISCONTINUED | OUTPATIENT
Start: 2017-06-20 | End: 2017-06-20

## 2017-06-20 RX ORDER — SODIUM CHLORIDE 9 MG/ML
INJECTION, SOLUTION INTRAVENOUS CONTINUOUS
Status: DISCONTINUED | OUTPATIENT
Start: 2017-06-20 | End: 2017-06-20 | Stop reason: HOSPADM

## 2017-06-20 RX ORDER — NALOXONE HYDROCHLORIDE 0.4 MG/ML
.1-.4 INJECTION, SOLUTION INTRAMUSCULAR; INTRAVENOUS; SUBCUTANEOUS
Status: DISCONTINUED | OUTPATIENT
Start: 2017-06-20 | End: 2017-06-20 | Stop reason: HOSPADM

## 2017-06-20 RX ORDER — ONDANSETRON 4 MG/1
4 TABLET, ORALLY DISINTEGRATING ORAL EVERY 30 MIN PRN
Status: DISCONTINUED | OUTPATIENT
Start: 2017-06-20 | End: 2017-06-20 | Stop reason: HOSPADM

## 2017-06-20 RX ORDER — OXYCODONE AND ACETAMINOPHEN 5; 325 MG/1; MG/1
1-2 TABLET ORAL EVERY 4 HOURS PRN
Status: DISCONTINUED | OUTPATIENT
Start: 2017-06-20 | End: 2017-06-20 | Stop reason: HOSPADM

## 2017-06-20 RX ORDER — LIDOCAINE HYDROCHLORIDE AND EPINEPHRINE 10; 10 MG/ML; UG/ML
INJECTION, SOLUTION INFILTRATION; PERINEURAL PRN
Status: DISCONTINUED | OUTPATIENT
Start: 2017-06-20 | End: 2017-06-20 | Stop reason: HOSPADM

## 2017-06-20 RX ORDER — ALBUTEROL SULFATE 0.83 MG/ML
2.5 SOLUTION RESPIRATORY (INHALATION) EVERY 4 HOURS PRN
Status: DISCONTINUED | OUTPATIENT
Start: 2017-06-20 | End: 2017-06-20 | Stop reason: HOSPADM

## 2017-06-20 RX ADMIN — PROPOFOL 150 MG: 10 INJECTION, EMULSION INTRAVENOUS at 12:53

## 2017-06-20 RX ADMIN — ONDANSETRON 4 MG: 2 INJECTION INTRAMUSCULAR; INTRAVENOUS at 13:51

## 2017-06-20 RX ADMIN — SODIUM CHLORIDE: 9 INJECTION, SOLUTION INTRAVENOUS at 12:12

## 2017-06-20 RX ADMIN — SODIUM CHLORIDE: 9 INJECTION, SOLUTION INTRAVENOUS at 13:40

## 2017-06-20 RX ADMIN — OXYCODONE HYDROCHLORIDE AND ACETAMINOPHEN 1 TABLET: 5; 325 TABLET ORAL at 15:12

## 2017-06-20 RX ADMIN — LIDOCAINE HYDROCHLORIDE,EPINEPHRINE BITARTRATE 9 ML: 10; .01 INJECTION, SOLUTION INFILTRATION; PERINEURAL at 13:07

## 2017-06-20 RX ADMIN — LIDOCAINE HYDROCHLORIDE 1 ML: 10 INJECTION, SOLUTION EPIDURAL; INFILTRATION; INTRACAUDAL; PERINEURAL at 12:11

## 2017-06-20 RX ADMIN — DEXAMETHASONE SODIUM PHOSPHATE 10 MG: 10 INJECTION, SOLUTION INTRAMUSCULAR; INTRAVENOUS at 12:59

## 2017-06-20 RX ADMIN — LIDOCAINE HYDROCHLORIDE 40 MG: 20 INJECTION, SOLUTION INFILTRATION; PERINEURAL at 12:53

## 2017-06-20 RX ADMIN — DIMENHYDRINATE 12.5 MG: 50 INJECTION, SOLUTION INTRAMUSCULAR; INTRAVENOUS at 12:59

## 2017-06-20 RX ADMIN — FENTANYL CITRATE 100 MCG: 50 INJECTION, SOLUTION INTRAMUSCULAR; INTRAVENOUS at 12:47

## 2017-06-20 RX ADMIN — SUCCINYLCHOLINE CHLORIDE 80 MG: 20 INJECTION, SOLUTION INTRAMUSCULAR; INTRAVENOUS at 12:53

## 2017-06-20 ASSESSMENT — LIFESTYLE VARIABLES: TOBACCO_USE: 0

## 2017-06-20 NOTE — ANESTHESIA PREPROCEDURE EVALUATION
Anesthesia Evaluation     . Pt has had prior anesthetic.     No history of anesthetic complications          ROS/MED HX    ENT/Pulmonary:     (+)CALLUM risk factors snores loudly, hypertension, , . .   (-) tobacco use   Neurologic:  - neg neurologic ROS     Cardiovascular:     (+) hypertension----. : . . . :. .       METS/Exercise Tolerance:     Hematologic:  - neg hematologic  ROS       Musculoskeletal: Comment: Left sided shoulder, hip and leg pain        GI/Hepatic:     (+) GERD (has not needed meds for 2 months) Asymptomatic on medication, hepatitis type Other,       Renal/Genitourinary:     (+) chronic renal disease, type: CRI,       Endo:     (+) thyroid problem hypothyroidism, .      Psychiatric:  - neg psychiatric ROS       Infectious Disease:  - neg infectious disease ROS       Malignancy:      - no malignancy   Other:    (+) No chance of pregnancy                    Physical Exam  Normal systems: cardiovascular and pulmonary    Airway   Mallampati: II  TM distance: >3 FB  Neck ROM: full    Dental   Comment: Has some permanent bridges    Cardiovascular   Rhythm and rate: regular and normal      Pulmonary    breath sounds clear to auscultation                    Anesthesia Plan      History & Physical Review  History and physical reviewed and following examination; no interval change.    ASA Status:  2 .    NPO Status:  > 8 hours    Plan for General and ETT with Intravenous and Propofol induction. Maintenance will be Inhalation.    PONV prophylaxis:  Ondansetron (or other 5HT-3), Dexamethasone or Solumedrol, Meclizine or Dimenhydrinate and Scopolamine patch  Additional equipment: Videolaryngoscope Pt has motion sickness.  Will attempt to balance sedation with nausea      Postoperative Care  Postoperative pain management:  IV analgesics and Oral pain medications.      Consents  Anesthetic plan, risks, benefits and alternatives discussed with:  Patient.  Use of blood products discussed: No .   .                           .

## 2017-06-20 NOTE — ANESTHESIA POSTPROCEDURE EVALUATION
Patient: Erwin Tolliver    Procedure(s):  Septoplasty, Submucosal resection of the turbinates - Wound Class: II-Clean Contaminated    Diagnosis:deviated septum, large turbinates  Diagnosis Additional Information: No value filed.    Anesthesia Type:  General    Note:  Anesthesia Post Evaluation    Patient location during evaluation: PACU  Patient participation: Able to fully participate in evaluation  Level of consciousness: awake  Pain management: adequate  Airway patency: patent  Cardiovascular status: acceptable and hemodynamically stable  Respiratory status: acceptable, room air and nonlabored ventilation  Hydration status: stable  PONV: none     Anesthetic complications: None    Comments: Patient was happy with the anesthesia care received and no anesthesia related complications were noted.  I will follow up with the patient again if it is needed.        Last vitals:  Vitals:    06/20/17 1435 06/20/17 1440 06/20/17 1445   BP: 134/71 125/71 138/76   Resp: 14 14 14   Temp:      SpO2: 97% 94% 98%         Electronically Signed By: SADIQ Leal CRNA  June 20, 2017  2:49 PM

## 2017-06-20 NOTE — IP AVS SNAPSHOT
[facility]    [address]     [place] [address]    Phone:  [phone]                                       After Visit Summary   6/20/2017    Erwin Tolliver    MRN: [id number]           After Visit Summary Signature Page     I have received my discharge instructions, and my questions have been answered. I have discussed any challenges I see with this plan with the nurse or doctor.    ..........................................................................................................................................  Patient/Patient Representative Signature      ..........................................................................................................................................  Patient Representative Print Name and Relationship to Patient    ..................................................               ................................................  Date                                            Time    ..........................................................................................................................................  Reviewed by Signature/Title    ...................................................              ..............................................  Date                                                            Time

## 2017-06-20 NOTE — IP AVS SNAPSHOT
MRN:[id number]                      After Visit Summary   6/20/2017    Erwin Tolliver    MRN: [id number]           Thank you!     Thank you for choosing [facility] for your care. Our goal is always to provide you with excellent care. Hearing back from our patients is one way we can continue to improve our services. Please take a few minutes to complete the written survey that you may receive in the mail after you visit with us. Thank you!        Patient Information     Date Of Birth          1935        About your hospital stay     You were admitted on:  June 20, 2017 You last received care in the:  [facility] Phase II    You were discharged on:  June 20, 2017       Who to Call     For medical emergencies, please call 911.  For non-urgent questions about your medical care, please call your primary care provider or clinic, [phone]  For questions related to your surgery, please call your surgery clinic        Attending Provider     Provider Specialty    Edwin Cox MD Otolaryngology       Primary Care Provider Office Phone # Fax #    Jacqueline ALEX Brandt [phone]      After Care Instructions     Check with Provider when to start anticoagulants           Diet Instructions       Resume pre procedure diet            Discharge Instructions - Lifting restrictions       Lifting Restrictions 30 pounds until seen at Post-op follow up appointment            No Alcohol       For 24 hours following procedure            No Aspirin, Ibuprofen or Naproxen products       for 7 - 10 days following surgery            No driving or operating machinery       until the day after procedure                  Your next 10 appointments already scheduled     Jun 26, 2017  9:00 AM CDT   [facility] Cox MD   [facility]    [address] [place] [address] [id number] [phone]              Further instructions from  your care team       [facility] [place]  HOME CARE INSTRUCTION AFTER ENDOSCOPIC SINUS SURGERY WITH OR WITHOUT SEPTOPLASTY AND TURBINOPLASTY  Dr. Cox  Date: 6/20/2017    1. Do not blow nose until the day after nasal packing has been removed and then blow gently.  If nasal packing has been placed, it will be removed the day after surgery (in most cases). Sinus packing may be left in place for 5-10 days.    2. Begin irrigating both sides of your nose with warm salt water, three times a day.  One day after packing has been removed or two days after surgery.  Use a rubber bulb or baby syringe which can be purchased at a drug store.  Purchase 1 gallon of distilled water, add to that, 5   tsp. salt.  Shake bottle to dissolve.  Fill clean cup with mixture and heat to lukewarm in microwave, or place entire bottle under hot tap water long enough to warm mixture to lukewarm.  Fill syringe and irrigate into each nostril, using one or two syringe fulls for each side of the nose.  Direct stream straight back.  Sit in a chair in front of the sink, irrigate gently and let the solution run out of the nostrils with head leaning forward.  Irrigation s are especially important during the first month, but may be continued longer.    3. Avoid sneezing.  If sneezing cannot be avoided, sneeze with your mouth open.    4. Keep head elevated about 30 degrees for the first week after surgery.  This will reduce swelling.    5. Arrange to have extra humidity in the home.  This will prevent a sore throat and promote comfort.  Keep humidity at 30% if possible.    6. No lifting beyond 5-10 pounds for the first week after surgery.  Then, you may increase lifting gradually.  After 10-14 days, restrictions are usually lifted.    7. No heavy exercises until your physician gives you approval.  As with lifting restrictions, exercise restrictions are usually lifted after 14 days.    8. Do not strain.  If constipation is a  problem, use a laxative or a stool softener.    9. Do not bend over or hang your head down for the first 2-3 weeks.    10. CALL YOUR PHYSICIAN IF:  Bleeding occurs, fever develops, or if pain increases rather than decreases.    11. IF BLEEDING OCCURS:  A. Sit upright, leaning slightly forward with head tilted downward, irrigate with salt water solution.   B. Pinch nostrils together tightly for 5 minutes, timing by the clock.   C. Release  and observe with head still tilted downward.  If bleeding continues, repeat B and C   D. If bleeding continues, go to the nearest emergency room or doctor s office.  Continue to squeeze nose tightly and keep head tilted downward.   E. If bleeding stops after above treatment, see your physician for an exam as soon as possible, to be sure that appropriate measures are taken to prevent future problems.    It is normal to feel very tired during the first week, or longer following surgery.  Get plenty of rest and drink lots of fluids.  Patients who have had Endoscopic sinus surgery should take a minimum of one week off work.  If the nasal septum has been straightened, soft plastic stents are placed against the side of the septum.  These will be removed 7-10 days after surgery.  For the first two months after sinus surgery, clinic visits are usually scheduled every 2-3 weeks for Endoscopic sinus cleaning.  This is done to ensure proper healing.  Our staff doctors are assisted with two Associate Doctors to promote quality post care.  DO NOT MISS these very important post-op appointments!    If you have any questions or problems, please call us at [phone].  You can reach a doctor at this number 24 hours a day or call [facility] at           [phone].      [facility]  Same-Day Surgery  Adult Discharge Orders & Instructions    For 24 hours after surgery    1. Get plenty of rest.  A responsible adult must stay with you for at least  24 hours after you leave the hospital.   2. Do not drive or use heavy equipment.  If you have weakness or tingling, don't drive or use heavy equipment until this feeling goes away.  3. Do not drink alcohol.  4. Avoid strenuous or risky activities.  Ask for help when climbing stairs.   5. You may feel lightheaded.  IF so, sit for a few minutes before standing.  Have someone help you get up.   6. If you have nausea (feel sick to your stomach): Drink only clear liquids such as apple juice, ginger ale, broth or 7-Up.  Rest may also help.  Be sure to drink enough fluids.  Move to a regular diet as you feel able.  7. You may have a slight fever. Call the doctor if your fever is over 100 F (37.7 C) (taken under the tongue) or lasts longer than 24 hours.  8. You may have a dry mouth, a sore throat, muscle aches or trouble sleeping.  These should go away after 24 hours.  9. Do not make important or legal decisions.   Call your doctor for any of the followin.  Signs of infection (fever, growing tenderness at the surgery site, a large amount of drainage or bleeding, severe pain, foul-smelling drainage, redness, swelling).    2. It has been over 8 to 10 hours since surgery and you are still not able to urinate (pass water).    3.  Headache for over 24 hours.    To contact your surgeon, call Specialty Services at [phone] or:      [phone] Nurse Advice Line (answered 24 hours a day)      [phone] [facility]      DISCHARGE INSTRUCTIONS FOR PATIENT   SCOPOLAMINE TRANSDERMAL PATCH  You may leave the patch on behind your ear for three days, but NO LONGER. You may have withdrawal symptoms (nausea, vomiting, headache, dizziness) if used longer.  When you remove the patch, you may wash and dry your hands thoroughly and before touching your eyes, as pupil may dilate.  Discard patch (away from children and pets).  You may develop some urinary hesitancy or urine  retention.          Pending Results     No orders found from 6/18/2017 to 6/21/2017.            Admission Information     Date & Time Provider Department Dept. Phone    6/20/2017 Edwin Cox MD [facility] Phase [phone]      Your Vitals Were     Blood Pressure Temperature Respirations Pulse Oximetry          139/77 97.4  F (36.3  C) (Axillary) 14 94%        MyChart Information     [facility] gives you secure access to your electronic health record. If you see a primary care provider, you can also send messages to your care team and make appointments. If you have questions, please call your primary care clinic.  If you do not have a primary care provider, please call [phone] and they will assist you.        Care EveryWhere ID     This is your Care EveryWhere ID. This could be used by other organizations to access your [facility] medical records  [id number]           Review of your medicines      START taking        Dose / Directions    cephALEXin 500 MG capsule   Commonly known as:  KEFLEX        Dose:  500 mg   Take 1 capsule (500 mg) by mouth 2 times daily for 7 days   Quantity:  14 capsule   Refills:  0       ondansetron 4 MG ODT tab   Commonly known as:  ZOFRAN-ODT        Dose:  4-8 mg   Take 1-2 tablets (4-8 mg) by mouth every 8 hours as needed for nausea Dissolve ON the tongue.   Quantity:  10 tablet   Refills:  0       oxyCODONE-acetaminophen 5-325 MG per tablet   Commonly known as:  PERCOCET        Dose:  1-2 tablet   Take 1-2 tablets by mouth every 4 hours as needed for pain (moderate to severe)   Quantity:  40 tablet   Refills:  0         CONTINUE these medicines which have NOT CHANGED        Dose / Directions    ACETAMINOPHEN PO        Refills:  0       ADVIL COLD/SINUS PO        Refills:  0       buPROPion 300 MG 24 hr tablet   Commonly known as:  WELLBUTRIN XL   Used for:  Moderate recurrent major depression (H)        Dose:  300 mg   Take 1 tablet (300 mg) by mouth every morning    Quantity:  90 tablet   Refills:  3       ipratropium 0.03 % spray   Commonly known as:  ATROVENT   Used for:  Seasonal allergic rhinitis due to other allergic trigger        Dose:  2 spray   Spray 2 sprays into both nostrils 3 times daily   Quantity:  1 Box   Refills:  3       levothyroxine 88 MCG tablet   Commonly known as:  SYNTHROID/LEVOTHROID   Used for:  Hypothyroidism, unspecified type        Dose:  88 mcg   Take 1 tablet (88 mcg) by mouth daily   Quantity:  90 tablet   Refills:  3       metoprolol 100 MG 24 hr tablet   Commonly known as:  TOPROL-XL   Used for:  Hypertension, goal below 150/90        Dose:  100 mg   Take 1 tablet (100 mg) by mouth daily   Quantity:  90 tablet   Refills:  3       omeprazole 20 MG CR capsule   Commonly known as:  priLOSEC   Used for:  Gastroesophageal reflux disease without esophagitis        TAKE ONE CAPSULE BY MOUTH EVERY DAY AS NEEDED   Quantity:  45 capsule   Refills:  3       simvastatin 20 MG tablet   Commonly known as:  ZOCOR   Used for:  Hyperlipidemia, unspecified        TAKE ONE TABLET BY MOUTH AT BEDTIME   Quantity:  90 tablet   Refills:  3       SUMAtriptan 25 MG tablet   Commonly known as:  IMITREX   Used for:  Migraine with aura and without status migrainosus, not intractable        Dose:  25-50 mg   Take 1-2 tablets (25-50 mg) by mouth at onset of headache for migraine May repeat in 2 hours. Max 8 tablets/24 hours.   Quantity:  9 tablet   Refills:  1       traZODone 50 MG tablet   Commonly known as:  DESYREL   Used for:  Insomnia, unspecified type        TAKE 2-3 TABLETS BY MOUTH NIGHTLY AS NEEDED FOR SLEEP   Quantity:  270 tablet   Refills:  3       venlafaxine 75 MG 24 hr capsule   Commonly known as:  EFFEXOR-XR   Used for:  Moderate recurrent major depression (H)        Dose:  225 mg   Take 3 capsules (225 mg) by mouth daily   Quantity:  270 capsule   Refills:  1            Where to get your medicines      Some of these will need a paper prescription and  others can be bought over the counter. Ask your nurse if you have questions.     Bring a paper prescription for each of these medications     cephALEXin 500 MG capsule    ondansetron 4 MG ODT tab    oxyCODONE-acetaminophen 5-325 MG per tablet                Protect others around you: Learn how to safely use, store and throw away your medicines at www.disposemymeds.org.             Medication List: This is a list of all your medications and when to take them. Check marks below indicate your daily home schedule. Keep this list as a reference.      Medications           Morning Afternoon Evening Bedtime As Needed    ACETAMINOPHEN PO                                ADVIL COLD/SINUS PO                                buPROPion 300 MG 24 hr tablet   Commonly known as:  WELLBUTRIN XL   Take 1 tablet (300 mg) by mouth every morning                                cephALEXin 500 MG capsule   Commonly known as:  KEFLEX   Take 1 capsule (500 mg) by mouth 2 times daily for 7 days                                ipratropium 0.03 % spray   Commonly known as:  ATROVENT   Spray 2 sprays into both nostrils 3 times daily                                levothyroxine 88 MCG tablet   Commonly known as:  SYNTHROID/LEVOTHROID   Take 1 tablet (88 mcg) by mouth daily                                metoprolol 100 MG 24 hr tablet   Commonly known as:  TOPROL-XL   Take 1 tablet (100 mg) by mouth daily                                omeprazole 20 MG CR capsule   Commonly known as:  priLOSEC   TAKE ONE CAPSULE BY MOUTH EVERY DAY AS NEEDED                                ondansetron 4 MG ODT tab   Commonly known as:  ZOFRAN-ODT   Take 1-2 tablets (4-8 mg) by mouth every 8 hours as needed for nausea Dissolve ON the tongue.                                oxyCODONE-acetaminophen 5-325 MG per tablet   Commonly known as:  PERCOCET   Take 1-2 tablets by mouth every 4 hours as needed for pain (moderate to severe)   Last time this was given:  1 tablet on  6/20/2017  3:12 PM                                simvastatin 20 MG tablet   Commonly known as:  ZOCOR   TAKE ONE TABLET BY MOUTH AT BEDTIME                                SUMAtriptan 25 MG tablet   Commonly known as:  IMITREX   Take 1-2 tablets (25-50 mg) by mouth at onset of headache for migraine May repeat in 2 hours. Max 8 tablets/24 hours.                                traZODone 50 MG tablet   Commonly known as:  DESYREL   TAKE 2-3 TABLETS BY MOUTH NIGHTLY AS NEEDED FOR SLEEP                                venlafaxine 75 MG 24 hr capsule   Commonly known as:  EFFEXOR-XR   Take 3 capsules (225 mg) by mouth daily

## 2017-06-20 NOTE — DISCHARGE INSTRUCTIONS
[facility] [place]  HOME CARE INSTRUCTION AFTER ENDOSCOPIC SINUS SURGERY WITH OR WITHOUT SEPTOPLASTY AND TURBINOPLASTY  Dr. Cox  Date: 6/20/2017    1. Do not blow nose until the day after nasal packing has been removed and then blow gently.  If nasal packing has been placed, it will be removed the day after surgery (in most cases). Sinus packing may be left in place for 5-10 days.    2. Begin irrigating both sides of your nose with warm salt water, three times a day.  One day after packing has been removed or two days after surgery.  Use a rubber bulb or baby syringe which can be purchased at a drug store.  Purchase 1 gallon of distilled water, add to that, 5   tsp. salt.  Shake bottle to dissolve.  Fill clean cup with mixture and heat to lukewarm in microwave, or place entire bottle under hot tap water long enough to warm mixture to lukewarm.  Fill syringe and irrigate into each nostril, using one or two syringe fulls for each side of the nose.  Direct stream straight back.  Sit in a chair in front of the sink, irrigate gently and let the solution run out of the nostrils with head leaning forward.  Irrigation s are especially important during the first month, but may be continued longer.    3. Avoid sneezing.  If sneezing cannot be avoided, sneeze with your mouth open.    4. Keep head elevated about 30 degrees for the first week after surgery.  This will reduce swelling.    5. Arrange to have extra humidity in the home.  This will prevent a sore throat and promote comfort.  Keep humidity at 30% if possible.    6. No lifting beyond 5-10 pounds for the first week after surgery.  Then, you may increase lifting gradually.  After 10-14 days, restrictions are usually lifted.    7. No heavy exercises until your physician gives you approval.  As with lifting restrictions, exercise restrictions are usually lifted after 14 days.    8. Do not strain.  If constipation is a problem, use a  laxative or a stool softener.    9. Do not bend over or hang your head down for the first 2-3 weeks.    10. CALL YOUR PHYSICIAN IF:  Bleeding occurs, fever develops, or if pain increases rather than decreases.    11. IF BLEEDING OCCURS:  A. Sit upright, leaning slightly forward with head tilted downward, irrigate with salt water solution.   B. Pinch nostrils together tightly for 5 minutes, timing by the clock.   C. Release  and observe with head still tilted downward.  If bleeding continues, repeat B and C   D. If bleeding continues, go to the nearest emergency room or doctor s office.  Continue to squeeze nose tightly and keep head tilted downward.   E. If bleeding stops after above treatment, see your physician for an exam as soon as possible, to be sure that appropriate measures are taken to prevent future problems.    It is normal to feel very tired during the first week, or longer following surgery.  Get plenty of rest and drink lots of fluids.  Patients who have had Endoscopic sinus surgery should take a minimum of one week off work.  If the nasal septum has been straightened, soft plastic stents are placed against the side of the septum.  These will be removed 7-10 days after surgery.  For the first two months after sinus surgery, clinic visits are usually scheduled every 2-3 weeks for Endoscopic sinus cleaning.  This is done to ensure proper healing.  Our staff doctors are assisted with two Associate Doctors to promote quality post care.  DO NOT MISS these very important post-op appointments!    If you have any questions or problems, please call us at [phone].  You can reach a doctor at this number 24 hours a day or call [facility] at           [phone].      [facility]  Same-Day Surgery  Adult Discharge Orders & Instructions    For 24 hours after surgery    1. Get plenty of rest.  A responsible adult must stay with you for at least 24 hours after  you leave the hospital.   2. Do not drive or use heavy equipment.  If you have weakness or tingling, don't drive or use heavy equipment until this feeling goes away.  3. Do not drink alcohol.  4. Avoid strenuous or risky activities.  Ask for help when climbing stairs.   5. You may feel lightheaded.  IF so, sit for a few minutes before standing.  Have someone help you get up.   6. If you have nausea (feel sick to your stomach): Drink only clear liquids such as apple juice, ginger ale, broth or 7-Up.  Rest may also help.  Be sure to drink enough fluids.  Move to a regular diet as you feel able.  7. You may have a slight fever. Call the doctor if your fever is over 100 F (37.7 C) (taken under the tongue) or lasts longer than 24 hours.  8. You may have a dry mouth, a sore throat, muscle aches or trouble sleeping.  These should go away after 24 hours.  9. Do not make important or legal decisions.   Call your doctor for any of the followin.  Signs of infection (fever, growing tenderness at the surgery site, a large amount of drainage or bleeding, severe pain, foul-smelling drainage, redness, swelling).    2. It has been over 8 to 10 hours since surgery and you are still not able to urinate (pass water).    3.  Headache for over 24 hours.    To contact your surgeon, call Specialty Services at [phone] or:      [phone] Nurse Advice Line (answered 24 hours a day)      [phone] [facility]      DISCHARGE INSTRUCTIONS FOR PATIENT   SCOPOLAMINE TRANSDERMAL PATCH  You may leave the patch on behind your ear for three days, but NO LONGER. You may have withdrawal symptoms (nausea, vomiting, headache, dizziness) if used longer.  When you remove the patch, you may wash and dry your hands thoroughly and before touching your eyes, as pupil may dilate.  Discard patch (away from children and pets).  You may develop some urinary hesitancy or urine retention.

## 2017-06-20 NOTE — ANESTHESIA CARE TRANSFER NOTE
Patient: [place] ORLY Tolliver    Procedure(s):  Septoplasty, Submucosal resection of the turbinates - Wound Class: II-Clean Contaminated    Diagnosis: deviated septum, large turbinates  Diagnosis Additional Information: No value filed.    Anesthesia Type:   General     Note:  Airway :Room Air  Patient transferred to:PACU        Vitals: (Last set prior to Anesthesia Care Transfer)    CRNA VITALS  6/20/2017 1335 - 6/20/2017 1413      6/20/2017             SpO2: 95 %                Electronically Signed By: SADIQ Leal CRNA  June 20, 2017  2:13 PM

## 2017-06-20 NOTE — OP NOTE
OTOLARYNGOLOGY OPERATIVE NOTE    SURGEON: Edwin oCx MD     ASSISTANT: none     PREOPERATIVE DIAGNOSES:   1. Nasal obstruction  2. Deviated septum  3. Inferior turbinate hypertrophy    POSTOPERATIVE DIAGNOSES:   Same    PROCEDURE:   1. Septoplasty  2. Submucosal resection of turbinates    INDICATIONS: As above.     SURGICAL FINDINGS:   deviated septum to the left    BRIEF HISTORY: Patient has a history of deviated septum, large inferior turbinates. The patient understands the risks and benefits of surgery, limitations, complications including but not limited to bleeding, infection, nasal septum perforation, recurrence of symptoms, need for additional procedures, need for repeat procedures, chronic epistaxis, risks related to anesthesia amongst others. Wishes surgery and now fully agrees to it.     DESCRIPTION OF PROCEDURE: The patient is taken to the operating room, placed under general endotracheal anesthetic, appropriately positioned, prepped and draped. We examined the nose, saw that indeed she is quite deflected to the left side. We started a septoplasty on the left side in an anterior hemitransfixion position, carrying down the incision to the floor of the nose. We identified the anterior aspect of the quadrangular cartilage and with the dhiraj elevator, carefully cleared up the mucoperichondrium. Once we started elevating the flap, we switched to a [place] elevator. Further developed the flap superiorly, posteriorly, inferiorly towards the floor of the nose. We identified bony cartilaginous junction. About 1/3 of the way in, we bisected the cartilage in a vertical fashion and in a swing-door type of opening, started elevation of the mucoperichondrium on both sides. We used a swivel knife to remove some of the posterior quadrangular cartilage. We now defined the upper plate of the ethmoid and had some spurs as well as some spurs in the vomer and maxillary crest. We used 4 mm osteotome to remove some of the  spurs off the maxillary crest and the vomer. We elevated the mucoperiosteum with Cisse elevator. We also removed some of the spurs including the perpendicular plate of the ethmoid with a double-action rongeur. We then morcellized some of the posterior quadrangular cartilage for posterior support and stability. We brought the flap back into anatomic position, closed it anteriorly with interrupted 4-0 chromic sutures. At this point, we also addressed the inferior turbinates.  We injected the inferior turbinates with 1% lidocaine with epinephrine at 1:100,000.  Using a 2.9mm harvey microdebrider, two insertion points were created and the microdebrider was used to remove submucosal tissue and bone in each of the inferior turbinates.  Bipolar cautery was used to control hemostasis. We thenused the Breathe Easy splints to stabilize the septum and secure it with a 3-0 nylon suture. The patient tolerated the procedure well with about 20mL blood loss. Extubated in the OR, taken to recovery in stable condition.   RALPH CASTILLO MD

## 2017-06-21 NOTE — OR NURSING
Safe Accounts (purposefully retained items) Examples: BURAK's, Hemovac, penrose,Wound packing, Ureteral stents, Newell, PIV/Picc Line    We care about the coordination of your care  1. Do you still have nasal stents in place? Yes  2. If the item is in place, Can you review the plan for removal with me? Removal at follow up appointment Monday June 26,2017

## 2017-06-23 NOTE — PROGRESS NOTES
[facility] Otolaryngology Post-Op / Progress Note         Assessment and Plan:    Assessment:   Post-operative day #6   Septoplasty  Turbinate reduction surgery     Doing well.  Normal healing wound.  No immediate surgical complications identified.  No excessive bleeding  Pain well-controlled.      Plan:   Advance diet as tolerated  Encouraged to drink fluids  Humidified air  Monitor for signs of infection  Pain control measures  Continue nasal saline twice a day for 2 weeks  Follow up in 1 month           Interval History:   Doing well.  Continues to improve.  Pain is well-controlled.  No fevers.            Significant Problems:      Patient Active Problem List   Diagnosis     Migraine with aura     Hypothyroidism     Hyperlipidemia, unspecified     Osteopenia     GERD (gastroesophageal reflux disease)     Atopic rhinitis     CKD (chronic kidney disease) stage 3, GFR 30-59 ml/min     SVT (supraventricular tachycardia) (H)     Advanced directives, counseling/discussion     Moderate recurrent major depression (H)     Macular degeneration (senile) of retina     Hypertension, goal below 150/90     Seasonal allergic rhinitis due to other allergic trigger     Other chronic sinusitis     Penicillin allergy     Epistaxis             Review of Systems:    The patient denies any chest pain, shortness of breath, excessive pain, fever, chills, purulent drainage from the wound, nausea or vomiting.          Medications:     Current Outpatient Prescriptions   Medication Sig     ACETAMINOPHEN PO      oxyCODONE-acetaminophen (PERCOCET) 5-325 MG per tablet Take 1-2 tablets by mouth every 4 hours as needed for pain (moderate to severe)     ondansetron (ZOFRAN-ODT) 4 MG ODT tab Take 1-2 tablets (4-8 mg) by mouth every 8 hours as needed for nausea Dissolve ON the tongue.     cephALEXin (KEFLEX) 500 MG capsule Take 1 capsule (500 mg) by mouth 2 times daily for 7 days     venlafaxine (EFFEXOR-XR) 75 MG 24 hr capsule Take 3  "capsules (225 mg) by mouth daily     SUMAtriptan (IMITREX) 25 MG tablet Take 1-2 tablets (25-50 mg) by mouth at onset of headache for migraine May repeat in 2 hours. Max 8 tablets/24 hours.     ipratropium (ATROVENT) 0.03 % spray Spray 2 sprays into both nostrils 3 times daily     metoprolol (TOPROL-XL) 100 MG 24 hr tablet Take 1 tablet (100 mg) by mouth daily     omeprazole (PRILOSEC) 20 MG CR capsule TAKE ONE CAPSULE BY MOUTH EVERY DAY AS NEEDED     simvastatin (ZOCOR) 20 MG tablet TAKE ONE TABLET BY MOUTH AT BEDTIME     levothyroxine (SYNTHROID/LEVOTHROID) 88 MCG tablet Take 1 tablet (88 mcg) by mouth daily     buPROPion (WELLBUTRIN XL) 300 MG 24 hr tablet Take 1 tablet (300 mg) by mouth every morning     traZODone (DESYREL) 50 MG tablet TAKE 2-3 TABLETS BY MOUTH NIGHTLY AS NEEDED FOR SLEEP     Pseudoephedrine-Ibuprofen (ADVIL COLD/SINUS PO)      No current facility-administered medications for this visit.              Physical Exam:   All vitals have been reviewed  Patient Vitals for the past 24 hrs:   Temp Temp src Height Weight   06/26/17 0855 97.4  F (36.3  C) Temporal 1.613 m (5' 3.5\") 68 kg (150 lb)     Splints are removed in clinic today.          Data:   No imaging or lab studies have been ordered    Progress note was partially captured by Essence Kern MA and reviewed/addended by Dr. Cox for accuracy and content.      Edwin Cox M.D.       "

## 2017-06-26 ENCOUNTER — OFFICE VISIT (OUTPATIENT)
Dept: OTOLARYNGOLOGY | Facility: CLINIC | Age: 82
End: 2017-06-26
Payer: COMMERCIAL

## 2017-06-26 VITALS — HEIGHT: 64 IN | TEMPERATURE: 97.4 F | BODY MASS INDEX: 25.61 KG/M2 | WEIGHT: 150 LBS

## 2017-06-26 DIAGNOSIS — Z98.890 POSTOPERATIVE STATE: Primary | ICD-10-CM

## 2017-06-26 PROCEDURE — 99024 POSTOP FOLLOW-UP VISIT: CPT | Performed by: OTOLARYNGOLOGY

## 2017-06-26 NOTE — MR AVS SNAPSHOT
After Visit Summary   6/26/2017    Erwin Tolliver    MRN: [id number]           Patient Information     Date Of Birth          1935        Visit Information        Provider Department      6/26/2017 9:00 AM Edwin Cox MD [facility]         Follow-ups after your visit        Your next 10 appointments already scheduled     Jul 24, 2017 10:45 AM CDT   [facility] Visit with Edwin Cox MD   [facility]    [address] [place] [id number] [phone]              Who to contact     If you have questions or need follow up information about today's clinic visit or your schedule please contact [facility] directly at [phone].  Normal or non-critical lab and imaging results will be communicated to you by MyChart, letter or phone within 4 business days after the clinic has received the results. If you do not hear from us within 7 days, please contact the clinic through [employer] or phone. If you have a critical or abnormal lab result, we will notify you by phone as soon as possible.  Submit refill requests through [employer] or call your pharmacy and they will forward the refill request to us. Please allow 3 business days for your refill to be completed.          Additional Information About Your Visit        MyChart Information     [employer] gives you secure access to your electronic health record. If you see a primary care provider, you can also send messages to your care team and make appointments. If you have questions, please call your primary care clinic.  If you do not have a primary care provider, please call [phone] and they will assist you.        Care EveryWhere ID     This is your Care EveryWhere ID. This could be used by other organizations to access your [place] medical records  [id number]        Your Vitals Were     Temperature Height BMI (Body Mass Index)             97.4  F (36.3  C)  "(Temporal) 1.613 m (5' 3.5\") 26.15 kg/m2          Blood Pressure from Last 3 Encounters:   06/20/17 138/79   06/13/17 130/70   05/16/17 136/74    Weight from Last 3 Encounters:   06/26/17 68 kg (150 lb)   06/13/17 65.3 kg (144 lb)   05/16/17 66.2 kg (146 lb)              Today, you had the following     No orders found for display       Primary Care Provider Office Phone # Fax #    Jacqueline JOHNSON MD Karly [phone]       [facility] [address] [facility] [address]  [facility] [address]        Equal Access to Services     CHANTALE FERNÁNDEZ : Brendan Ronquillo, yvon adair, eriberto kaalmada puma, luis daniel haro . So [facility] [place] [phone].    ATENCIÓN: Si habla español, tiene a caraballo disposición servicios gratuitos de asistencia lingüística. Llame al [phone].    We comply with applicable federal civil rights laws and [place] laws. We do not discriminate on the basis of race, color, national origin, age, disability sex, sexual orientation or gender identity.            Thank you!     Thank you for choosing [facility]  for your care. Our goal is always to provide you with excellent care. Hearing back from our patients is one way we can continue to improve our services. Please take a few minutes to complete the written survey that you may receive in the mail after your visit with us. Thank you!             Your Updated Medication List - Protect others around you: Learn how to safely use, store and throw away your medicines at www.disposemymeds.org.          This list is accurate as of: 6/26/17  9:26 AM.  Always use your most recent med list.                   Brand Name Dispense Instructions for use Diagnosis    ACETAMINOPHEN PO           ADVIL COLD/SINUS PO           buPROPion 300 MG 24 hr tablet    WELLBUTRIN XL    90 tablet    Take 1 tablet (300 mg) by mouth every morning    Moderate recurrent major depression (H)       cephALEXin 500 MG capsule    " KEFLEX    14 capsule    Take 1 capsule (500 mg) by mouth 2 times daily for 7 days        ipratropium 0.03 % spray    ATROVENT    1 Box    Spray 2 sprays into both nostrils 3 times daily    Seasonal allergic rhinitis due to other allergic trigger       levothyroxine 88 MCG tablet    SYNTHROID/LEVOTHROID    90 tablet    Take 1 tablet (88 mcg) by mouth daily    Hypothyroidism, unspecified type       metoprolol 100 MG 24 hr tablet    TOPROL-XL    90 tablet    Take 1 tablet (100 mg) by mouth daily    Hypertension, goal below 150/90       omeprazole 20 MG CR capsule    priLOSEC    45 capsule    TAKE ONE CAPSULE BY MOUTH EVERY DAY AS NEEDED    Gastroesophageal reflux disease without esophagitis       ondansetron 4 MG ODT tab    ZOFRAN-ODT    10 tablet    Take 1-2 tablets (4-8 mg) by mouth every 8 hours as needed for nausea Dissolve ON the tongue.        oxyCODONE-acetaminophen 5-325 MG per tablet    PERCOCET    40 tablet    Take 1-2 tablets by mouth every 4 hours as needed for pain (moderate to severe)        simvastatin 20 MG tablet    ZOCOR    90 tablet    TAKE ONE TABLET BY MOUTH AT BEDTIME    Hyperlipidemia, unspecified       SUMAtriptan 25 MG tablet    IMITREX    9 tablet    Take 1-2 tablets (25-50 mg) by mouth at onset of headache for migraine May repeat in 2 hours. Max 8 tablets/24 hours.    Migraine with aura and without status migrainosus, not intractable       traZODone 50 MG tablet    DESYREL    270 tablet    TAKE 2-3 TABLETS BY MOUTH NIGHTLY AS NEEDED FOR SLEEP    Insomnia, unspecified type       venlafaxine 75 MG 24 hr capsule    EFFEXOR-XR    270 capsule    Take 3 capsules (225 mg) by mouth daily    Moderate recurrent major depression (H)

## 2017-06-26 NOTE — NURSING NOTE
"Chief Complaint   Patient presents with     Surgical Followup     Septoplasty Submucosal resection of turbinates       Initial Temp 97.4  F (36.3  C) (Temporal)  Ht 1.613 m (5' 3.5\")  Wt 68 kg (150 lb)  BMI 26.15 kg/m2 Estimated body mass index is 26.15 kg/(m^2) as calculated from the following:    Height as of this encounter: 1.613 m (5' 3.5\").    Weight as of this encounter: 68 kg (150 lb).  Medication Reconciliation: complete   Glen HERMAN CMA      "

## 2017-07-21 NOTE — PROGRESS NOTES
Chief Complaint - Recheck Sinus    History of Present Illness - Erwin Tolliver is a 82 year old female who presents with for a recheck on sinus symptoms. Their symptoms of recurring sinus infections has resolved.    She had a Septoplasty, Submucosal resection of the turbinates on 6/20/17.    She has been using nasal saline at home.    Erwin has completed treatment that we previously prescribed.    She has been having a feeling of water in her left ear.  This has been ongoing for a few months to a year.  It comes and goes.  She notes it goes away when she takes Advil cold and sinus.   She does wear hearing aids.       Past Medical History -   Past Medical History:   Diagnosis Date     Arthritis      Depressive disorder      Depressive disorder, not elsewhere classified      Hepatitis, unspecified 1993    [facility]     Hypertension      Migraine with aura, without mention of intractable migraine without mention of status migrainosus      Need for prophylactic hormone replacement therapy (postmenopausal)      Palpitations      Thyroid disease      Unspecified essential hypertension        Current Medications -   Current Outpatient Prescriptions:      ACETAMINOPHEN PO, , Disp: , Rfl:      ondansetron (ZOFRAN-ODT) 4 MG ODT tab, Take 1-2 tablets (4-8 mg) by mouth every 8 hours as needed for nausea Dissolve ON the tongue., Disp: 10 tablet, Rfl: 0     venlafaxine (EFFEXOR-XR) 75 MG 24 hr capsule, Take 3 capsules (225 mg) by mouth daily, Disp: 270 capsule, Rfl: 1     SUMAtriptan (IMITREX) 25 MG tablet, Take 1-2 tablets (25-50 mg) by mouth at onset of headache for migraine May repeat in 2 hours. Max 8 tablets/24 hours., Disp: 9 tablet, Rfl: 1     ipratropium (ATROVENT) 0.03 % spray, Spray 2 sprays into both nostrils 3 times daily, Disp: 1 Box, Rfl: 3     metoprolol (TOPROL-XL) 100 MG 24 hr tablet, Take 1 tablet (100 mg) by mouth daily, Disp: 90 tablet, Rfl: 3     omeprazole (PRILOSEC) 20 MG CR capsule, TAKE ONE  CAPSULE BY MOUTH EVERY DAY AS NEEDED, Disp: 45 capsule, Rfl: 3     simvastatin (ZOCOR) 20 MG tablet, TAKE ONE TABLET BY MOUTH AT BEDTIME, Disp: 90 tablet, Rfl: 3     levothyroxine (SYNTHROID/LEVOTHROID) 88 MCG tablet, Take 1 tablet (88 mcg) by mouth daily, Disp: 90 tablet, Rfl: 3     buPROPion (WELLBUTRIN XL) 300 MG 24 hr tablet, Take 1 tablet (300 mg) by mouth every morning, Disp: 90 tablet, Rfl: 3     traZODone (DESYREL) 50 MG tablet, TAKE 2-3 TABLETS BY MOUTH NIGHTLY AS NEEDED FOR SLEEP, Disp: 270 tablet, Rfl: 3     Pseudoephedrine-Ibuprofen (ADVIL COLD/SINUS PO), , Disp: , Rfl:     Allergies -   Allergies   Allergen Reactions     Aspirin      headaches,tremors,nausea     Augmentin [Amoxicillin-Pot Clavulanate] Nausea and Vomiting       Social History -   Social History     Social History     Marital status:      Spouse name: Marcellus     Number of children: 4     Years of education: 12     Occupational History     retired      Social History Main Topics     Smoking status: Former Smoker     Packs/day: 0.50     Years: 10.00     Quit date: 1966     Smokeless tobacco: Never Used      Comment: no smokers in household     Alcohol use No      Comment: beerx2/ x8per month     Drug use: No     Sexual activity: Yes     Partners: Male     Birth control/ protection: Surgical      Comment: Complete Hysterectomy     Other Topics Concern     Parent/Sibling W/ Cabg, Mi Or Angioplasty Before 65f 55m? No     Social History Narrative       Family History -   Family History   Problem Relation Age of Onset     Alzheimer Disease Mother      Arthritis Sister       at 72 years     Lipids Sister      OSTEOPOROSIS Sister      osteoporosis     Genetic Disorder Sister      down syndrome     Alzheimer Disease Sister      Thyroid Disease Sister      Cardiovascular Brother       at 72 years of CHF       Review of Systems - As per HPI and PMHx, otherwise system review of the head and neck negative.    Physical  Exam  Pulse 81  Wt 67.9 kg (149 lb 12.8 oz)  SpO2 97%  BMI 26.12 kg/m2  BMI - Body mass index is 26.12 kg/(m^2).    General - The patient is well nourished and well developed, and appears to have good nutritional status.  Alert and oriented to person and place, answers questions and cooperates with examination appropriately.     SKIN - No suspicious lesions or rashes.  Respiration - No respiratory distress.  Head and Face - Normocephalic and atraumatic, with no gross asymmetry noted of the contour of the facial features.  The facial nerve is intact, with strong symmetric movements.    Voice and Breathing - The patient was breathing comfortably without the use of accessory muscles. There was no wheezing, stridor, or stertor.  The patients voice was clear and strong, and had appropriate pitch and quality.    Ears - Bilateral pinna and EACs with normal appearing overlying skin. Tympanic membrane intact with good mobility on pneumatic otoscopy bilaterally. Bony landmarks of the ossicular chain are normal. The tympanic membranes are normal in appearance. No retraction, perforation, or masses.  No fluid or purulence was seen in the external canal or the middle ear.     Eyes - Extraocular movements intact.  Sclera were not icteric or injected, conjunctiva were pink and moist.      Nose - External contour is symmetric, no gross deflection or scars.  Nasal mucosa is pink and moist with no abnormal mucus.  The septum was midline and non-obstructive, turbinates of normal size and position.  No polyps, masses, or purulence noted on examination.      Performed in clinic today:  No procedures preformed in clinic today.    ASSESSMENT/PLAN:  Erwin Tolliver is a 82 year old female for a recheck after sinus surgery and Eustachian tube dysfunction.      Advised her that everything looks good on exam for her sinuses.  For the Eustachian tube dysfunction I advise her to pop the ear and use nasal saline in the left side.      Progress note was partially captured by Essence Kern MA and reviewed/addended by Dr. Cox for accuracy and content.      Edwin Cox MD

## 2017-07-24 ENCOUNTER — OFFICE VISIT (OUTPATIENT)
Dept: OTOLARYNGOLOGY | Facility: CLINIC | Age: 82
End: 2017-07-24
Payer: COMMERCIAL

## 2017-07-24 VITALS — HEART RATE: 81 BPM | OXYGEN SATURATION: 97 % | WEIGHT: 149.8 LBS | BODY MASS INDEX: 26.12 KG/M2

## 2017-07-24 DIAGNOSIS — H69.92 DYSFUNCTION OF EUSTACHIAN TUBE, LEFT: Primary | ICD-10-CM

## 2017-07-24 PROCEDURE — 99213 OFFICE O/P EST LOW 20 MIN: CPT | Mod: 24 | Performed by: OTOLARYNGOLOGY

## 2017-07-24 NOTE — NURSING NOTE
"Chief Complaint   Patient presents with     RECHECK     Sinus. Septoplasty with SMR turbinates       Initial Pulse 81  Wt 67.9 kg (149 lb 12.8 oz)  SpO2 97%  BMI 26.12 kg/m2 Estimated body mass index is 26.12 kg/(m^2) as calculated from the following:    Height as of 6/26/17: 1.613 m (5' 3.5\").    Weight as of this encounter: 67.9 kg (149 lb 12.8 oz).  Medication Reconciliation: complete  "

## 2017-07-24 NOTE — MR AVS SNAPSHOT
After Visit Summary   7/24/2017    Erwin Tolliver    MRN: [id number]           Patient Information     Date Of Birth          1935        Visit Information        Provider Department      7/24/2017 10:45 AM Edwin Cox MD [facility]        Today's Diagnoses     Dysfunction of eustachian tube, left    -  1       Follow-ups after your visit        Who to contact     If you have questions or need follow up information about today's clinic visit or your schedule please contact [facility] directly at [phone].  Normal or non-critical lab and imaging results will be communicated to you by [employer], letter or phone within 4 business days after the clinic has received the results. If you do not hear from us within 7 days, please contact the clinic through [employer] or phone. If you have a critical or abnormal lab result, we will notify you by phone as soon as possible.  Submit refill requests through [employer] or call your pharmacy and they will forward the refill request to us. Please allow 3 business days for your refill to be completed.          Additional Information About Your Visit        MyChart Information     [employer] gives you secure access to your electronic health record. If you see a primary care provider, you can also send messages to your care team and make appointments. If you have questions, please call your primary care clinic.  If you do not have a primary care provider, please call [phone] and they will assist you.        Care EveryWhere ID     This is your Care EveryWhere ID. This could be used by other organizations to access your [place] medical records  [id number]        Your Vitals Were     Pulse Pulse Oximetry BMI (Body Mass Index)             81 97% 26.12 kg/m2          Blood Pressure from Last 3 Encounters:   06/20/17 138/79   06/13/17 130/70   05/16/17 136/74    Weight from Last 3 Encounters:   07/24/17 149 lb 12.8 oz (67.9 kg)    06/26/17 150 lb (68 kg)   06/13/17 144 lb (65.3 kg)              Today, you had the following     No orders found for display       Primary Care Provider Office Phone # Fax #    Jacqueline JOHNSON MD [place] [phone]       [facility] [address] [facility] [address]  [facility] [address]        Equal Access to Services     [facility]: Hadii aad ku hadasho Soomaali, waaxda luqadaha, qaybta kaalmada adeegyada, waxay idiin hayaan adeeg kharash la'aan ah. So [facility] [phone].    ATENCIÓN: Si ange leach, tiene a caraballo disposición servicios gratuitos de asistencia lingüística. Yannick al [phone].    We comply with applicable federal civil rights laws and [place] laws. We do not discriminate on the basis of race, color, national origin, age, disability sex, sexual orientation or gender identity.            Thank you!     Thank you for choosing [facility]  for your care. Our goal is always to provide you with excellent care. Hearing back from our patients is one way we can continue to improve our services. Please take a few minutes to complete the written survey that you may receive in the mail after your visit with us. Thank you!             Your Updated Medication List - Protect others around you: Learn how to safely use, store and throw away your medicines at www.disposemymeds.org.          This list is accurate as of: 7/24/17  3:40 PM.  Always use your most recent med list.                   Brand Name Dispense Instructions for use Diagnosis    ACETAMINOPHEN PO           ADVIL COLD/SINUS PO           buPROPion 300 MG 24 hr tablet    WELLBUTRIN XL    90 tablet    Take 1 tablet (300 mg) by mouth every morning    Moderate recurrent major depression (H)       ipratropium 0.03 % spray    ATROVENT    1 Box    Spray 2 sprays into both nostrils 3 times daily    Seasonal allergic rhinitis due to other allergic trigger       levothyroxine 88 MCG tablet    SYNTHROID/LEVOTHROID    90 tablet     Take 1 tablet (88 mcg) by mouth daily    Hypothyroidism, unspecified type       metoprolol 100 MG 24 hr tablet    TOPROL-XL    90 tablet    Take 1 tablet (100 mg) by mouth daily    Hypertension, goal below 150/90       omeprazole 20 MG CR capsule    priLOSEC    45 capsule    TAKE ONE CAPSULE BY MOUTH EVERY DAY AS NEEDED    Gastroesophageal reflux disease without esophagitis       ondansetron 4 MG ODT tab    ZOFRAN-ODT    10 tablet    Take 1-2 tablets (4-8 mg) by mouth every 8 hours as needed for nausea Dissolve ON the tongue.        simvastatin 20 MG tablet    ZOCOR    90 tablet    TAKE ONE TABLET BY MOUTH AT BEDTIME    Hyperlipidemia, unspecified       SUMAtriptan 25 MG tablet    IMITREX    9 tablet    Take 1-2 tablets (25-50 mg) by mouth at onset of headache for migraine May repeat in 2 hours. Max 8 tablets/24 hours.    Migraine with aura and without status migrainosus, not intractable       traZODone 50 MG tablet    DESYREL    270 tablet    TAKE 2-3 TABLETS BY MOUTH NIGHTLY AS NEEDED FOR SLEEP    Insomnia, unspecified type       venlafaxine 75 MG 24 hr capsule    EFFEXOR-XR    270 capsule    Take 3 capsules (225 mg) by mouth daily    Moderate recurrent major depression (H)

## 2017-10-03 ENCOUNTER — TELEPHONE (OUTPATIENT)
Dept: FAMILY MEDICINE | Facility: OTHER | Age: 82
End: 2017-10-03

## 2017-10-03 NOTE — TELEPHONE ENCOUNTER
Patient presents to the clinic with her son today with a facial laceration.   She was painting her bathroom when she slipped and fell.   She hit her left cheek on the side of the tub and the back of her head on the toilet.   She did not have LOC.   She is starting to develop a headache, but considers it mild at this point.   There is a one inch laceration to her left cheek bone.   Bleeding is controlled.   Swelling is increasing while patient was in clinic.   Wound was cleansed with sterile water and Hibiclens.   Dr. Brandt inspected the wound and agrees that steri strips should be sufficient in approximating the edges of the wound.   Two steri strips were applied.   Signs of infection were discussed with patient.   She verbalizes understanding and agrees with the plan.     Capir Shah RN, BSN

## 2017-11-07 ENCOUNTER — TRANSFERRED RECORDS (OUTPATIENT)
Dept: HEALTH INFORMATION MANAGEMENT | Facility: CLINIC | Age: 82
End: 2017-11-07

## 2017-11-17 NOTE — PROGRESS NOTES
SUBJECTIVE:                                                    Erwin Tolliver is a 82 year old female who presents to clinic today for the following health issues:      History of Present Illness     Depression & Anxiety Follow-up:     Depression/Anxiety:  Depression only    Status since last visit::  Improved    Other associated symptoms of depression::  None    Significant life event::  No    Current substance use::  Alcohol    Anxiety/Panic symptoms::  No    She feels her mood is much better off the Effexor and just staying on the Wellbutrin.  She notices that she is much different and doesn't get as easily frustrated.    She states that her daughter is concerned about her memory.  Erwin states that she gets frustrated when trying to remember names, she eventually does.  She also got lost once when at an appointment and trying to get out of a busy shopping area and turned the wrong direction, it took her a couple hours to then get home.  She tells me that she will have someone else drive her there if she needs to go back.  Denies other times of getting lost.  Mother had dementia late in life.    Answers for HPI/ROS submitted by the patient on 11/22/2017   If you checked off any problems, how difficult have these problems made it for you to do your work, take care of things at home, or get along with other people?: Somewhat difficult  PHQ9 TOTAL SCORE: 2    Hyperlipidemia Follow-Up      Rate your low fat/cholesterol diet?: good    Taking statin?  Yes, no muscle aches from statin    Other lipid medications/supplements?:  none    Hypothyroidism Follow-up      Since last visit, patient describes the following symptoms: Weight stable, no hair loss, no skin changes, no constipation, no loose stools    Problem list and histories reviewed & adjusted, as indicated.  Additional history: as documented    Patient Active Problem List   Diagnosis     Migraine with aura     Hypothyroidism     Hyperlipidemia, unspecified      Osteopenia     GERD (gastroesophageal reflux disease)     Atopic rhinitis     CKD (chronic kidney disease) stage 3, GFR 30-59 ml/min     SVT (supraventricular tachycardia) (H)     Advanced directives, counseling/discussion     Moderate recurrent major depression (H)     Macular degeneration (senile) of retina     Hypertension, goal below 150/90     Seasonal allergic rhinitis due to other allergic trigger     Other chronic sinusitis     Penicillin allergy     Epistaxis     Past Surgical History:   Procedure Laterality Date     APPENDECTOMY       BACK SURGERY      bulged disc     C TOTAL ABDOM HYSTERECTOMY      Hysterectomy, Total Abdominal and BSO -benign - age 41     COLONOSCOPY       ENT SURGERY       EYE SURGERY       HC COLONOSCOPY W BIOPSY  06     HC KNEE SCOPE, DIAGNOSTIC      Arthroscopy, Knee     HC REDUCTION OF LARGE BREAST       ORTHOPEDIC SURGERY       SEPTOPLASTY N/A 2017    Procedure: SEPTOPLASTY;  Septoplasty, Submucosal resection of the turbinates;  Surgeon: Edwin Cox MD;  Location:  OR       Social History   Substance Use Topics     Smoking status: Former Smoker     Packs/day: 0.50     Years: 10.00     Quit date: 1966     Smokeless tobacco: Never Used      Comment: no smokers in household     Alcohol use No      Comment: beerx2/ x8per month     Family History   Problem Relation Age of Onset     Alzheimer Disease Mother      Arthritis Sister       at 72 years     Lipids Sister      OSTEOPOROSIS Sister      osteoporosis     Genetic Disorder Sister      down syndrome     Alzheimer Disease Sister      Thyroid Disease Sister      Cardiovascular Brother       at 72 years of CHF             ROS:  C: NEGATIVE for fever, chills, change in weight  E/M: NEGATIVE for ear, mouth and throat problems  R: NEGATIVE for significant cough or SOB  CV: NEGATIVE for chest pain, palpitations or peripheral edema    OBJECTIVE:     /76 (BP Location: Right arm, Patient  "Position: Chair, Cuff Size: Adult Regular)  Pulse 77  Temp 97.9  F (36.6  C) (Oral)  Resp 16  Ht 5' 3.5\" (1.613 m)  Wt 151 lb (68.5 kg)  SpO2 100%  BMI 26.33 kg/m2  Body mass index is 26.33 kg/(m^2).  Gen: no apparent distress  NECK: no adenopathy, no asymmetry, no masses  Chest: clear to auscultation without wheeze, rale or rhonchi  Cor: regular rate and rhythm without murmur  ABDOMEN: soft, nontender, no masses and bowel sounds normal  Ext: warm and dry without edema  Psych: Alert and oriented times 3; coherent speech, normal   rate and volume, able to articulate logical thoughts, able   to abstract reason, no tangential thoughts, no hallucinations   or delusions  Her affect is neutral    Six Item Cognitive Impairment Test   (6CIT):      What year is it?                               Correct - 0 points    What month is it?                               Correct - 0 points      Give the patient an address to remember with five components:   [place] ( first and last name - 2 components)   [address]  (number and name of street - 2 components)   [place] ( city - 1 component)      About what time is it (within the hour)? Correct - 0 points    Count backwards from 20 to 1:   Correct - 0 points    Say the months of the year in reverse: Correct - 0 points    Repeat the address phrase:   Correct - 0 points    Total 6CIT Score:      0/28    Interpretation: The 6CIT uses an inverse score and questions are weighted to produce a total out of 28. Scores of 0-7 are considered normal and 8 or more significant.    Advantages The test has high sensitivity without compromising specificity even in mild dementia. It is easy to translate linguistically and culturally.  Disadvantages The main disadvantage is in the scoring and weighting of the test, which is initially confusing, however computer models have simplified this greatly.    Probability Statistics: At the 7/8 cut off: Overall figures sensitivity 90% " specificity 100%, in mild dementia sensitivity = 78% , specificity = 100%    Copyright 2000 [facility], [facility], . Courtesy of Dr. Wei Atwood       ASSESSMENT/PLAN:     1. Moderate recurrent major depression (H)  Stable on Wellbutrin.    - buPROPion (WELLBUTRIN XL) 300 MG 24 hr tablet; Take 1 tablet (300 mg) by mouth every morning  Dispense: 90 tablet; Refill: 3  - Vitamin B12; Future    2. Hypothyroidism, unspecified type  Will return for fasting labs.    - levothyroxine (SYNTHROID/LEVOTHROID) 88 MCG tablet; Take 1 tablet (88 mcg) by mouth daily  Dispense: 90 tablet; Refill: 3  - TSH with free T4 reflex; Future    3. Hyperlipidemia, unspecified hyperlipidemia type  Will return for fasting labs.    - simvastatin (ZOCOR) 20 MG tablet; TAKE ONE TABLET BY MOUTH AT BEDTIME  Dispense: 90 tablet; Refill: 3  - Lipid panel reflex to direct LDL Fasting; Future  - Comprehensive metabolic panel (BMP + Alb, Alk Phos, ALT, AST, Total. Bili, TP); Future    4. Insomnia, unspecified type  Refills given.    - traZODone (DESYREL) 50 MG tablet; TAKE 2-3 TABLETS BY MOUTH NIGHTLY AS NEEDED FOR SLEEP  Dispense: 270 tablet; Refill: 3    5. CKD (chronic kidney disease) stage 3, GFR 30-59 ml/min  BP controlled.    - Albumin Random Urine Quantitative with Creat Ratio  - CBC with platelets; Future  - Vitamin B12; Future    6. Hypertension, goal below 150/90  BP controlled.    - Albumin Random Urine Quantitative with Creat Ratio  - metoprolol (TOPROL-XL) 100 MG 24 hr tablet; Take 1 tablet (100 mg) by mouth daily  Dispense: 90 tablet; Refill: 3  - CBC with platelets; Future  - Vitamin B12; Future    7. Gastroesophageal reflux disease without esophagitis  Doing well on every other day proton pump inhibitor, she will try going to every 3rd day.    - omeprazole (PRILOSEC) 20 MG CR capsule; Take 1 capsule (20 mg) by mouth every other day TAKE ONE CAPSULE BY MOUTH EVERY DAY AS NEEDED  Dispense: 45 capsule; Refill: 3    8.  "Memory difficulty  Discussed memory, she passed the 6CIT, although she started to get anxious with the moths of the year.  She wants to be \"proactive\" when it comes to screening for dementia.  Discussed that right now she feels her memory is fine aside from remembering names, I have asked her to talk with her daughter more and if her daughter feels her memory is worse than that could then consider Neurology consult vs neuropsychometric testing.  Will check B12 when she returns for labs.    Jacqueline Brandt MD  [facility]"

## 2017-11-22 ASSESSMENT — PATIENT HEALTH QUESTIONNAIRE - PHQ9
10. IF YOU CHECKED OFF ANY PROBLEMS, HOW DIFFICULT HAVE THESE PROBLEMS MADE IT FOR YOU TO DO YOUR WORK, TAKE CARE OF THINGS AT HOME, OR GET ALONG WITH OTHER PEOPLE: SOMEWHAT DIFFICULT
SUM OF ALL RESPONSES TO PHQ QUESTIONS 1-9: 2
SUM OF ALL RESPONSES TO PHQ QUESTIONS 1-9: 2

## 2017-11-23 ASSESSMENT — PATIENT HEALTH QUESTIONNAIRE - PHQ9: SUM OF ALL RESPONSES TO PHQ QUESTIONS 1-9: 2

## 2017-11-24 ENCOUNTER — OFFICE VISIT (OUTPATIENT)
Dept: FAMILY MEDICINE | Facility: OTHER | Age: 82
End: 2017-11-24
Payer: COMMERCIAL

## 2017-11-24 VITALS
SYSTOLIC BLOOD PRESSURE: 132 MMHG | TEMPERATURE: 97.9 F | WEIGHT: 151 LBS | DIASTOLIC BLOOD PRESSURE: 76 MMHG | HEART RATE: 77 BPM | BODY MASS INDEX: 25.78 KG/M2 | RESPIRATION RATE: 16 BRPM | HEIGHT: 64 IN | OXYGEN SATURATION: 100 %

## 2017-11-24 DIAGNOSIS — E03.9 HYPOTHYROIDISM, UNSPECIFIED TYPE: ICD-10-CM

## 2017-11-24 DIAGNOSIS — I10 HYPERTENSION, GOAL BELOW 150/90: ICD-10-CM

## 2017-11-24 DIAGNOSIS — F33.1 MODERATE RECURRENT MAJOR DEPRESSION (H): Primary | ICD-10-CM

## 2017-11-24 DIAGNOSIS — G47.00 INSOMNIA, UNSPECIFIED TYPE: ICD-10-CM

## 2017-11-24 DIAGNOSIS — E78.5 HYPERLIPIDEMIA, UNSPECIFIED HYPERLIPIDEMIA TYPE: ICD-10-CM

## 2017-11-24 DIAGNOSIS — R41.3 MEMORY DIFFICULTY: ICD-10-CM

## 2017-11-24 DIAGNOSIS — K21.9 GASTROESOPHAGEAL REFLUX DISEASE WITHOUT ESOPHAGITIS: ICD-10-CM

## 2017-11-24 DIAGNOSIS — N18.30 CKD (CHRONIC KIDNEY DISEASE) STAGE 3, GFR 30-59 ML/MIN (H): ICD-10-CM

## 2017-11-24 LAB
CREAT UR-MCNC: 40 MG/DL
MICROALBUMIN UR-MCNC: 27 MG/L
MICROALBUMIN/CREAT UR: 67.42 MG/G CR (ref 0–25)

## 2017-11-24 PROCEDURE — 99214 OFFICE O/P EST MOD 30 MIN: CPT | Performed by: FAMILY MEDICINE

## 2017-11-24 PROCEDURE — 82043 UR ALBUMIN QUANTITATIVE: CPT | Performed by: FAMILY MEDICINE

## 2017-11-24 RX ORDER — TRAZODONE HYDROCHLORIDE 50 MG/1
TABLET, FILM COATED ORAL
Qty: 270 TABLET | Refills: 3 | Status: SHIPPED | OUTPATIENT
Start: 2017-11-24 | End: 2018-12-04

## 2017-11-24 RX ORDER — LEVOTHYROXINE SODIUM 88 UG/1
88 TABLET ORAL DAILY
Qty: 90 TABLET | Refills: 3 | Status: SHIPPED | OUTPATIENT
Start: 2017-11-24 | End: 2018-11-19

## 2017-11-24 RX ORDER — SIMVASTATIN 20 MG
TABLET ORAL
Qty: 90 TABLET | Refills: 3 | Status: SHIPPED | OUTPATIENT
Start: 2017-11-24 | End: 2018-12-04

## 2017-11-24 RX ORDER — METOPROLOL SUCCINATE 100 MG/1
100 TABLET, EXTENDED RELEASE ORAL DAILY
Qty: 90 TABLET | Refills: 3 | Status: SHIPPED | OUTPATIENT
Start: 2017-11-24 | End: 2018-12-04

## 2017-11-24 RX ORDER — BUPROPION HYDROCHLORIDE 300 MG/1
300 TABLET ORAL EVERY MORNING
Qty: 90 TABLET | Refills: 3 | Status: SHIPPED | OUTPATIENT
Start: 2017-11-24 | End: 2018-12-04

## 2017-11-24 ASSESSMENT — PAIN SCALES - GENERAL: PAINLEVEL: NO PAIN (0)

## 2017-11-24 NOTE — MR AVS SNAPSHOT
After Visit Summary   11/24/2017    Erwin Tolliver    MRN: [id number]           Patient Information     Date Of Birth          1935        Visit Information        Provider Department      11/24/2017 1:00 PM Jacqueline Brandt MD [facility] [place] [facility]        Today's Diagnoses     Moderate recurrent major depression (H)    -  1    Hypothyroidism, unspecified type        Hyperlipidemia, unspecified hyperlipidemia type        Insomnia, unspecified type        CKD (chronic kidney disease) stage 3, GFR 30-59 ml/min        Hypertension, goal below 150/90        Gastroesophageal reflux disease without esophagitis           Follow-ups after your visit        Who to contact     If you have questions or need follow up information about today's clinic visit or your schedule please contact [facility] directly at [phone].  Normal or non-critical lab and imaging results will be communicated to you by MyChart, letter or phone within 4 business days after the clinic has received the results. If you do not hear from us within 7 days, please contact the clinic through MyChart or phone. If you have a critical or abnormal lab result, we will notify you by phone as soon as possible.  Submit refill requests through [employer] or call your pharmacy and they will forward the refill request to us. Please allow 3 business days for your refill to be completed.          Additional Information About Your Visit        MyChart Information     [employer] gives you secure access to your electronic health record. If you see a primary care provider, you can also send messages to your care team and make appointments. If you have questions, please call your primary care clinic.  If you do not have a primary care provider, please call [phone] and they will assist you.        Care EveryWhere ID     This is your Care EveryWhere ID. This could be used by other organizations to access your [facility]  "records  [id number]        Your Vitals Were     Pulse Temperature Respirations Height Pulse Oximetry BMI (Body Mass Index)    77 97.9  F (36.6  C) (Oral) 16 5' 3.5\" (1.613 m) 100% 26.33 kg/m2       Blood Pressure from Last 3 Encounters:   11/24/17 132/76   06/20/17 138/79   06/13/17 130/70    Weight from Last 3 Encounters:   11/24/17 151 lb (68.5 kg)   07/24/17 149 lb 12.8 oz (67.9 kg)   06/26/17 150 lb (68 kg)              We Performed the Following     Albumin Random Urine Quantitative with Creat Ratio     CBC with platelets     Comprehensive metabolic panel     Lipid panel reflex to direct LDL Fasting     TSH with free T4 reflex     Vitamin B12          Today's Medication Changes          These changes are accurate as of: 11/24/17  1:27 PM.  If you have any questions, ask your nurse or doctor.               These medicines have changed or have updated prescriptions.        Dose/Directions    omeprazole 20 MG CR capsule   Commonly known as:  priLOSEC   This may have changed:    - how much to take  - how to take this  - when to take this   Used for:  Gastroesophageal reflux disease without esophagitis   Changed by:  Jacqueline Brandt MD        Dose:  20 mg   Take 1 capsule (20 mg) by mouth every other day TAKE ONE CAPSULE BY MOUTH EVERY DAY AS NEEDED   Quantity:  45 capsule   Refills:  3            Where to get your medicines      These medications were sent to [facility] [place], [place] - [address] [place] [address]  [address] [facility] [address]     Phone:  [phone]     buPROPion 300 MG 24 hr tablet    levothyroxine 88 MCG tablet    metoprolol 100 MG 24 hr tablet    omeprazole 20 MG CR capsule    simvastatin 20 MG tablet    traZODone 50 MG tablet                Primary Care Provider Office Phone # Fax #    Jacqueline Brandt [phone]       [address] [facility] [address]  [facility] [address]        Equal Access to Services     CHANTALE FERNÁNDEZ AH: yvon Casas " giovany angelaharper philippeluis daniel michel. So [facility] [phone].    ATENCIÓN: Si ange leach, tiene a caraballo disposición servicios gratuitos de asistencia lingüística. Yannick al [phone].    We comply with applicable federal civil rights laws and [place] laws. We do not discriminate on the basis of race, color, national origin, age, disability, sex, sexual orientation, or gender identity.            Thank you!     Thank you for choosing [facility]  for your care. Our goal is always to provide you with excellent care. Hearing back from our patients is one way we can continue to improve our services. Please take a few minutes to complete the written survey that you may receive in the mail after your visit with us. Thank you!             Your Updated Medication List - Protect others around you: Learn how to safely use, store and throw away your medicines at www.disposemymeds.org.          This list is accurate as of: 11/24/17  1:27 PM.  Always use your most recent med list.                   Brand Name Dispense Instructions for use Diagnosis    ACETAMINOPHEN PO           ADVIL COLD/SINUS PO           buPROPion 300 MG 24 hr tablet    WELLBUTRIN XL    90 tablet    Take 1 tablet (300 mg) by mouth every morning    Moderate recurrent major depression (H)       ipratropium 0.03 % spray    ATROVENT    1 Box    Spray 2 sprays into both nostrils 3 times daily    Seasonal allergic rhinitis due to other allergic trigger       levothyroxine 88 MCG tablet    SYNTHROID/LEVOTHROID    90 tablet    Take 1 tablet (88 mcg) by mouth daily    Hypothyroidism, unspecified type       metoprolol 100 MG 24 hr tablet    TOPROL-XL    90 tablet    Take 1 tablet (100 mg) by mouth daily    Hypertension, goal below 150/90       omeprazole 20 MG CR capsule    priLOSEC    45 capsule    Take 1 capsule (20 mg) by mouth every other day TAKE ONE CAPSULE BY MOUTH EVERY DAY AS NEEDED    Gastroesophageal  reflux disease without esophagitis       ondansetron 4 MG ODT tab    ZOFRAN-ODT    10 tablet    Take 1-2 tablets (4-8 mg) by mouth every 8 hours as needed for nausea Dissolve ON the tongue.        simvastatin 20 MG tablet    ZOCOR    90 tablet    TAKE ONE TABLET BY MOUTH AT BEDTIME    Hyperlipidemia, unspecified hyperlipidemia type       SUMAtriptan 25 MG tablet    IMITREX    9 tablet    Take 1-2 tablets (25-50 mg) by mouth at onset of headache for migraine May repeat in 2 hours. Max 8 tablets/24 hours.    Migraine with aura and without status migrainosus, not intractable       traZODone 50 MG tablet    DESYREL    270 tablet    TAKE 2-3 TABLETS BY MOUTH NIGHTLY AS NEEDED FOR SLEEP    Insomnia, unspecified type

## 2017-11-24 NOTE — NURSING NOTE
"Chief Complaint   Patient presents with     Depression     Panel Management     phq9, microalbumin       Initial /76 (BP Location: Right arm, Patient Position: Chair, Cuff Size: Adult Regular)  Pulse 77  Temp 97.9  F (36.6  C) (Oral)  Resp 16  Ht 5' 3.5\" (1.613 m)  Wt 151 lb (68.5 kg)  SpO2 100%  BMI 26.33 kg/m2 Estimated body mass index is 26.33 kg/(m^2) as calculated from the following:    Height as of this encounter: 5' 3.5\" (1.613 m).    Weight as of this encounter: 151 lb (68.5 kg).  Medication Reconciliation: complete   Raeann Lobato CMA (AAMA)      "

## 2017-11-27 DIAGNOSIS — F33.1 MODERATE RECURRENT MAJOR DEPRESSION (H): ICD-10-CM

## 2017-11-27 DIAGNOSIS — I10 HYPERTENSION, GOAL BELOW 150/90: ICD-10-CM

## 2017-11-27 DIAGNOSIS — N18.30 CKD (CHRONIC KIDNEY DISEASE) STAGE 3, GFR 30-59 ML/MIN (H): ICD-10-CM

## 2017-11-27 DIAGNOSIS — E03.9 HYPOTHYROIDISM, UNSPECIFIED TYPE: ICD-10-CM

## 2017-11-27 DIAGNOSIS — E78.5 HYPERLIPIDEMIA, UNSPECIFIED HYPERLIPIDEMIA TYPE: ICD-10-CM

## 2017-11-27 LAB
ALBUMIN SERPL-MCNC: 3.6 G/DL (ref 3.4–5)
ALP SERPL-CCNC: 77 U/L (ref 40–150)
ALT SERPL W P-5'-P-CCNC: 28 U/L (ref 0–50)
ANION GAP SERPL CALCULATED.3IONS-SCNC: 10 MMOL/L (ref 3–14)
AST SERPL W P-5'-P-CCNC: 19 U/L (ref 0–45)
BILIRUB SERPL-MCNC: 0.4 MG/DL (ref 0.2–1.3)
BUN SERPL-MCNC: 16 MG/DL (ref 7–30)
CALCIUM SERPL-MCNC: 8.7 MG/DL (ref 8.5–10.1)
CHLORIDE SERPL-SCNC: 105 MMOL/L (ref 94–109)
CHOLEST SERPL-MCNC: 159 MG/DL
CO2 SERPL-SCNC: 24 MMOL/L (ref 20–32)
CREAT SERPL-MCNC: 0.87 MG/DL (ref 0.52–1.04)
ERYTHROCYTE [DISTWIDTH] IN BLOOD BY AUTOMATED COUNT: 13.4 % (ref 10–15)
GFR SERPL CREATININE-BSD FRML MDRD: 63 ML/MIN/1.7M2
GLUCOSE SERPL-MCNC: 109 MG/DL (ref 70–99)
HCT VFR BLD AUTO: 39.7 % (ref 35–47)
HDLC SERPL-MCNC: 63 MG/DL
HGB BLD-MCNC: 12.9 G/DL (ref 11.7–15.7)
LDLC SERPL CALC-MCNC: 72 MG/DL
MCH RBC QN AUTO: 31.9 PG (ref 26.5–33)
MCHC RBC AUTO-ENTMCNC: 32.5 G/DL (ref 31.5–36.5)
MCV RBC AUTO: 98 FL (ref 78–100)
NONHDLC SERPL-MCNC: 96 MG/DL
PLATELET # BLD AUTO: 213 10E9/L (ref 150–450)
POTASSIUM SERPL-SCNC: 3.9 MMOL/L (ref 3.4–5.3)
PROT SERPL-MCNC: 7.4 G/DL (ref 6.8–8.8)
RBC # BLD AUTO: 4.04 10E12/L (ref 3.8–5.2)
SODIUM SERPL-SCNC: 139 MMOL/L (ref 133–144)
T4 FREE SERPL-MCNC: 0.99 NG/DL (ref 0.76–1.46)
TRIGL SERPL-MCNC: 122 MG/DL
TSH SERPL DL<=0.005 MIU/L-ACNC: 4.03 MU/L (ref 0.4–4)
VIT B12 SERPL-MCNC: 254 PG/ML (ref 193–986)
WBC # BLD AUTO: 6.6 10E9/L (ref 4–11)

## 2017-11-27 PROCEDURE — 82607 VITAMIN B-12: CPT | Performed by: FAMILY MEDICINE

## 2017-11-27 PROCEDURE — 84439 ASSAY OF FREE THYROXINE: CPT | Performed by: FAMILY MEDICINE

## 2017-11-27 PROCEDURE — 80053 COMPREHEN METABOLIC PANEL: CPT | Performed by: FAMILY MEDICINE

## 2017-11-27 PROCEDURE — 85027 COMPLETE CBC AUTOMATED: CPT | Performed by: FAMILY MEDICINE

## 2017-11-27 PROCEDURE — 84443 ASSAY THYROID STIM HORMONE: CPT | Performed by: FAMILY MEDICINE

## 2017-11-27 PROCEDURE — 36415 COLL VENOUS BLD VENIPUNCTURE: CPT | Performed by: FAMILY MEDICINE

## 2017-11-27 PROCEDURE — 80061 LIPID PANEL: CPT | Performed by: FAMILY MEDICINE

## 2018-01-08 ENCOUNTER — TELEPHONE (OUTPATIENT)
Dept: FAMILY MEDICINE | Facility: OTHER | Age: 83
End: 2018-01-08

## 2018-01-08 DIAGNOSIS — F33.1 MODERATE RECURRENT MAJOR DEPRESSION (H): ICD-10-CM

## 2018-01-10 NOTE — TELEPHONE ENCOUNTER
Attempted to contact patient, phone did not ring but went to a message stating voice mail had not yet been set up. Will try again later

## 2018-01-10 NOTE — TELEPHONE ENCOUNTER
Effexor:  Medication is not on med list. Please clarify if she is taking this and if so, what is her dose?    Capri Shah, RN, BSN

## 2018-01-11 RX ORDER — VENLAFAXINE HYDROCHLORIDE 150 MG/1
CAPSULE, EXTENDED RELEASE ORAL
Qty: 90 CAPSULE | Refills: 1 | OUTPATIENT
Start: 2018-01-11

## 2018-01-11 NOTE — TELEPHONE ENCOUNTER
"She does take this currently - it was given by Dr. Brandt. It was discontinued on 11/24/2017 by Dr. Brandt.   Per OV on 11/24/17: \"She feels her mood is much better off the Effexor and just staying on the Wellbutrin.  She notices that she is much different and doesn't get as easily frustrated.\"    She states she is taking 150mg of Effexor daily along with the 300mg of Wellbutrin. She states she is out of the Effexor and wanted Dr. Brandt to call her. Informed her she is out of the office. But can have RN or covering providers review this message.    To me, it looks like patient isn't supposed to be taking Effexor currently?    Please review/advise.  Sonia Mitchell [facility]    "

## 2018-01-12 ENCOUNTER — MYC MEDICAL ADVICE (OUTPATIENT)
Dept: FAMILY MEDICINE | Facility: OTHER | Age: 83
End: 2018-01-12

## 2018-01-12 DIAGNOSIS — F33.1 MODERATE RECURRENT MAJOR DEPRESSION (H): Primary | ICD-10-CM

## 2018-01-16 RX ORDER — VENLAFAXINE HYDROCHLORIDE 150 MG/1
150 CAPSULE, EXTENDED RELEASE ORAL DAILY
Qty: 90 CAPSULE | Refills: 0 | Status: SHIPPED | OUTPATIENT
Start: 2018-01-16 | End: 2018-04-09

## 2018-03-27 ENCOUNTER — OFFICE VISIT (OUTPATIENT)
Dept: FAMILY MEDICINE | Facility: OTHER | Age: 83
End: 2018-03-27
Payer: COMMERCIAL

## 2018-03-27 ENCOUNTER — TRANSFERRED RECORDS (OUTPATIENT)
Dept: HEALTH INFORMATION MANAGEMENT | Facility: CLINIC | Age: 83
End: 2018-03-27

## 2018-03-27 VITALS — OXYGEN SATURATION: 98 % | SYSTOLIC BLOOD PRESSURE: 170 MMHG | DIASTOLIC BLOOD PRESSURE: 78 MMHG | TEMPERATURE: 97.8 F

## 2018-03-27 DIAGNOSIS — R07.9 ACUTE CHEST PAIN: Primary | ICD-10-CM

## 2018-03-27 DIAGNOSIS — R06.02 SOB (SHORTNESS OF BREATH): ICD-10-CM

## 2018-03-27 PROBLEM — R04.0 EPISTAXIS: Status: RESOLVED | Noted: 2017-03-01 | Resolved: 2018-03-27

## 2018-03-27 PROBLEM — J30.89 SEASONAL ALLERGIC RHINITIS DUE TO OTHER ALLERGIC TRIGGER: Status: RESOLVED | Noted: 2017-03-01 | Resolved: 2018-03-27

## 2018-03-27 PROCEDURE — 99214 OFFICE O/P EST MOD 30 MIN: CPT | Performed by: FAMILY MEDICINE

## 2018-03-27 PROCEDURE — 93000 ELECTROCARDIOGRAM COMPLETE: CPT | Performed by: FAMILY MEDICINE

## 2018-03-27 RX ORDER — NITROGLYCERIN 0.4 MG/1
0.4 TABLET SUBLINGUAL ONCE
Qty: 1 TABLET | Refills: 0
Start: 2018-03-27 | End: 2020-09-29

## 2018-03-27 NOTE — MR AVS SNAPSHOT
After Visit Summary   3/27/2018    Erwin Tolliver    MRN: [id number]           Patient Information     Date Of Birth          1935        Visit Information        Provider Department      3/27/2018 8:20 AM Jacqueline Brandt MD [facility]        Today's Diagnoses     Acute chest pain    -  1    SOB (shortness of breath)           Follow-ups after your visit        Your next 10 appointments already scheduled     Mar 27, 2018  8:20 AM CDT   SHORT with Jacqueline Brandt MD   [facility] ([facility]    [address] [facility] [address]  [facility] [id number] [phone]              Who to contact     If you have questions or need follow up information about today's clinic visit or your schedule please contact [facility] directly at [phone].  Normal or non-critical lab and imaging results will be communicated to you by MyChart, letter or phone within 4 business days after the clinic has received the results. If you do not hear from us within 7 days, please contact the clinic through [employer] or phone. If you have a critical or abnormal lab result, we will notify you by phone as soon as possible.  Submit refill requests through [employer] or call your pharmacy and they will forward the refill request to us. Please allow 3 business days for your refill to be completed.          Additional Information About Your Visit        MyChart Information     [employer] gives you secure access to your electronic health record. If you see a primary care provider, you can also send messages to your care team and make appointments. If you have questions, please call your primary care clinic.  If you do not have a primary care provider, please call [phone] and they will assist you.        Care EveryWhere ID     This is your Care EveryWhere ID. This could be used by other organizations to access your [place] medical records  [id number]          Blood Pressure from Last 3 Encounters:   11/24/17 132/76   06/20/17 138/79   06/13/17 130/70    Weight from Last 3 Encounters:   11/24/17 151 lb (68.5 kg)   07/24/17 149 lb 12.8 oz (67.9 kg)   06/26/17 150 lb (68 kg)              Today, you had the following     No orders found for display       Primary Care Provider Office Phone # Fax #    Jacqueline JOHNSON MD Karly [phone]       [address] [facility] [address]  [facility] [address]        Equal Access to Services     [facility]: Hadii ira jones hadasho Soomaali, waaxda luqadaha, qaybta kaalmada adesoumyayada, luis daniel haro . So [facility] [phone].    ATENCIÓN: Si habla español, tiene a caraballo disposición servicios gratuitos de asistencia lingüística. [facility] [phone].    We comply with applicable federal civil rights laws and [place] laws. We do not discriminate on the basis of race, color, national origin, age, disability, sex, sexual orientation, or gender identity.            Thank you!     Thank you for choosing [facility]  for your care. Our goal is always to provide you with excellent care. Hearing back from our patients is one way we can continue to improve our services. Please take a few minutes to complete the written survey that you may receive in the mail after your visit with us. Thank you!             Your Updated Medication List - Protect others around you: Learn how to safely use, store and throw away your medicines at www.disposemymeds.org.          This list is accurate as of 3/27/18  8:17 AM.  Always use your most recent med list.                   Brand Name Dispense Instructions for use Diagnosis    ACETAMINOPHEN PO           ADVIL COLD/SINUS PO           buPROPion 300 MG 24 hr tablet    WELLBUTRIN XL    90 tablet    Take 1 tablet (300 mg) by mouth every morning    Moderate recurrent major depression (H)       ipratropium 0.03 % spray    ATROVENT    1 Box    Spray 2 sprays into both nostrils 3 times  daily    Seasonal allergic rhinitis due to other allergic trigger       levothyroxine 88 MCG tablet    SYNTHROID/LEVOTHROID    90 tablet    Take 1 tablet (88 mcg) by mouth daily    Hypothyroidism, unspecified type       metoprolol succinate 100 MG 24 hr tablet    TOPROL-XL    90 tablet    Take 1 tablet (100 mg) by mouth daily    Hypertension, goal below 150/90       omeprazole 20 MG CR capsule    priLOSEC    45 capsule    Take 1 capsule (20 mg) by mouth every other day TAKE ONE CAPSULE BY MOUTH EVERY DAY AS NEEDED    Gastroesophageal reflux disease without esophagitis       ondansetron 4 MG ODT tab    ZOFRAN-ODT    10 tablet    Take 1-2 tablets (4-8 mg) by mouth every 8 hours as needed for nausea Dissolve ON the tongue.        simvastatin 20 MG tablet    ZOCOR    90 tablet    TAKE ONE TABLET BY MOUTH AT BEDTIME    Hyperlipidemia, unspecified hyperlipidemia type       SUMAtriptan 25 MG tablet    IMITREX    9 tablet    Take 1-2 tablets (25-50 mg) by mouth at onset of headache for migraine May repeat in 2 hours. Max 8 tablets/24 hours.    Migraine with aura and without status migrainosus, not intractable       traZODone 50 MG tablet    DESYREL    270 tablet    TAKE 2-3 TABLETS BY MOUTH NIGHTLY AS NEEDED FOR SLEEP    Insomnia, unspecified type       venlafaxine 150 MG 24 hr capsule    EFFEXOR-XR    90 capsule    Take 1 capsule (150 mg) by mouth daily    Moderate recurrent major depression (H)

## 2018-03-27 NOTE — PROGRESS NOTES
SUBJECTIVE:   Erwin Tolliver is a 83 year old female who walks into clinic today with her  complaining of chest pain.  She states this awoke her up from sleeping at 630 this morning.  She states she had trouble catching her breath and then noticed she had chest tightness and pain.  She got up and moving and notified her  and the pain seemed to worsen instead of getting better and they decided to come to the clinic.  She has not had this before.  She denies having any sweating.  She does not feel that this is gas or heartburn.    When I got to the room she had her blood pressures were in the 170s over 100s.  Her oxygen sats were greater than 90%.  The ambulance had already been called by this point.      Problem list and histories reviewed & adjusted, as indicated.  Additional history: as documented    Patient Active Problem List   Diagnosis     Migraine with aura     Hypothyroidism     Hyperlipidemia, unspecified     Osteopenia     GERD (gastroesophageal reflux disease)     Atopic rhinitis     CKD (chronic kidney disease) stage 3, GFR 30-59 ml/min     SVT (supraventricular tachycardia) (H)     Advanced directives, counseling/discussion     Moderate recurrent major depression (H)     Macular degeneration (senile) of retina     Hypertension, goal below 150/90     Other chronic sinusitis     Penicillin allergy     Past Surgical History:   Procedure Laterality Date     APPENDECTOMY       BACK SURGERY      bulged disc     C TOTAL ABDOM HYSTERECTOMY      Hysterectomy, Total Abdominal and BSO -benign - age 41     COLONOSCOPY       ENT SURGERY       EYE SURGERY       HC COLONOSCOPY W BIOPSY  11/07/06     HC KNEE SCOPE, DIAGNOSTIC      Arthroscopy, Knee     HC REDUCTION OF LARGE BREAST       ORTHOPEDIC SURGERY       SEPTOPLASTY N/A 6/20/2017    Procedure: SEPTOPLASTY;  Septoplasty, Submucosal resection of the turbinates;  Surgeon: Edwin Cox MD;  Location:  OR       Social History   Substance Use  Topics     Smoking status: Former Smoker     Packs/day: 0.50     Years: 10.00     Quit date: 1966     Smokeless tobacco: Never Used      Comment: no smokers in household     Alcohol use No      Comment: beerx2/ x8per month     Family History   Problem Relation Age of Onset     Alzheimer Disease Mother      Arthritis Sister       at 72 years     Lipids Sister      OSTEOPOROSIS Sister      osteoporosis     Genetic Disorder Sister      down syndrome     Alzheimer Disease Sister      Thyroid Disease Sister      Cardiovascular Brother       at 72 years of CHF           Reviewed and updated as needed this visit by clinical staff  Allergies  Meds  Problems       Reviewed and updated as needed this visit by Provider  Allergies  Meds  Problems         ROS:  CONSTITUTIONAL: NEGATIVE for fever, chills, change in weight  ENT/MOUTH: NEGATIVE for ear, mouth and throat problems    OBJECTIVE:     There were no vitals taken for this visit.  There is no height or weight on file to calculate BMI.  Gen: No respiratory distress , mildly anxious  NECK: no adenopathy, no asymmetry, no masses  Chest: clear to auscultation without wheeze, rale or rhonchi  Cor: regular rate and rhythm without murmur  ABDOMEN: soft, nontender, no masses and bowel sounds normal  Ext: warm and dry without edema  Psych: Alert and oriented times 3; coherent speech, normal   rate and volume, able to articulate logical thoughts, able   to abstract reason, no tangential thoughts, no hallucinations   or delusions  Her affect is mildly anxious    EKG: Normal sinus rhythm, no ST changes    ASSESSMENT/PLAN:         ICD-10-CM    1. Acute chest pain R07.9    2. SOB (shortness of breath) R06.02      Oxygen was started.  She rated her chest pain 8 out of 10.  She was given 1 nitro.  Her pain decreased to 7 out of 10 and this is when paramedics arrived.   Aspirin was not given due to her allergy.  She states her allergy was that aspirin makes her  feel really sick.  She chose to go to [facility].    Jacqueline Brandt MD  [facility]

## 2018-03-28 ENCOUNTER — PATIENT OUTREACH (OUTPATIENT)
Dept: CARE COORDINATION | Facility: CLINIC | Age: 83
End: 2018-03-28

## 2018-03-28 ENCOUNTER — TELEPHONE (OUTPATIENT)
Dept: CARE COORDINATION | Facility: CLINIC | Age: 83
End: 2018-03-28

## 2018-03-28 DIAGNOSIS — Z71.89 COMPLEX CARE COORDINATION: Primary | ICD-10-CM

## 2018-03-28 NOTE — PROGRESS NOTES
Clinic Care Coordination Contact  [facility]/Voicemail    Referral Source: PCP  Clinical Data: Care Coordinator Outreach  Outreach attempted x 1.  Left message on voicemail with call back information and requested return call.  Plan: Care Coordinator will mail out care coordination introduction letter with care coordinator contact information and explanation of care coordination services. Care Coordinator will try to reach patient again in 1-2 business days.  Cricket Vega RN  Clinic Care Coordinator  [phone] or [phone]

## 2018-03-28 NOTE — LETTER
Health Care Home - Access Care Plan    About Me  Patient Name:  Erwin Sanchez    YOB: 1935  Age:                             83 year old   [place] MRN:            [id number] Telephone Information:     Home Phone [phone]   Mobile Not on file.       Address:    [address] [facility] [address] Email address:  [email or web address]      Emergency Contact(s)  Name Relationship Lgl Grd Work Phone Home Phone Mobile Phone   1. LUIS SANCHEZ* Spouse No  [phone]   2. TOY BONILLA Daughter   [phone]              Health Maintenance:      My Access Plan  Medical Emergency 911   Questions or concerns during clinic hours Primary Clinic Line, I will call the clinic directly: [facility] - [phone]   24 Hour Appointment Line [phone] or  [phone]) (toll free)   24 Hour Nurse Line [phone] (toll free)   Questions or concerns outside clinic hours 24 Hour Appointment Line, I will call the after-hours on-call line:   [place] [phone] or [phone]) (toll-free)   Preferred Urgent Care Other ([facility])   Preferred Hospital [facility]  [phone]   Preferred Pharmacy [facility] [place]     Behavioral Health Crisis Line The National Suicide Prevention Lifeline at 1-527.516.9507 or 911     My Care Team Members  Patient Care Team       Relationship Specialty Notifications Start End    Jacqueline Brandt MD PCP - General   12/13/00     Phone: [phone] Fax: [phone]         [address] [facility] [address]        My Medical and Care Information  Problem List   Patient Active Problem List   Diagnosis     Migraine with aura     Hypothyroidism     Hyperlipidemia, unspecified     Osteopenia     GERD (gastroesophageal reflux disease)     Atopic rhinitis     CKD (chronic kidney disease) stage 3, GFR 30-59 ml/min     SVT  (supraventricular tachycardia) (H)     Advanced directives, counseling/discussion     Moderate recurrent major depression (H)     Macular degeneration (senile) of retina     Hypertension, goal below 150/90     Other chronic sinusitis     Penicillin allergy      Current Medications and Allergies:  See printed Medication Report

## 2018-03-28 NOTE — TELEPHONE ENCOUNTER
DC'd from [facility] on 3-27-18 to home self care   Primary Problem: chest pain  LACE: 58 moderate high

## 2018-03-28 NOTE — LETTER
[facility]ARE COORDIN[facility]  [address] [place] [address]  [phone]    March 28, 2018    [facility]  [address] [facility] [address]      Dear Erwin,    I am a clinic care coordinator who works with Jacqueline Brandt MD at [facility]. I recently tried to call and was unable to reach you. I wanted to introduce myself and provide you with my contact information so that you can call me with questions or concerns about your health care. Below is a description of clinic care coordination and how I can further assist you.     The clinic care coordinator is a registered nurse and/or  who understand the health care system. The goal of clinic care coordination is to help you manage your health and improve access to the [facility] system in the most efficient manner. The registered nurse can assist you in meeting your health care goals by providing education, coordinating services, and strengthening the communication among your providers. The  can assist you with financial, behavioral, psychosocial, chemical dependency, counseling, and/or psychiatric resources.    Please feel free to contact me at [phone], [phone], with any questions or concerns. We at [facility] are focused on providing you with the highest-quality healthcare experience possible and that all starts with you.     Sincerely,     Cricket Vega RN  Clinic Care Coordinator    Enclosed: I have enclosed a copy of a 24 Hour Access Plan. This has helpful phone numbers for you to call when needed. Please keep this in an easy to access place to use as needed.

## 2018-04-09 DIAGNOSIS — F33.1 MODERATE RECURRENT MAJOR DEPRESSION (H): ICD-10-CM

## 2018-04-10 RX ORDER — VENLAFAXINE HYDROCHLORIDE 150 MG/1
CAPSULE, EXTENDED RELEASE ORAL
Qty: 90 CAPSULE | Refills: 1 | Status: SHIPPED | OUTPATIENT
Start: 2018-04-10 | End: 2018-10-09

## 2018-04-10 NOTE — TELEPHONE ENCOUNTER
venlafaxine (EFFEXOR-XR) 150 MG 24 hr capsule  PHQ-9 SCORE 5/16/2017 6/14/2017 11/22/2017   Total Score - - -   Total Score MyChart 4 (Minimal depression) - 2 (Minimal depression)   Total Score - 2 2     Prescription approved per [facility] Refill Protocol.  Ashley Fragoso RN, BSN

## 2018-04-11 NOTE — PROGRESS NOTES
Clinic Care Coordination Contact  [facility]/Voicemail    Referral Source: [facility]  Clinical Data: Care Coordinator Outreach  Outreach attempted x 2.  Left message on voicemail with call back information and requested return call.  Plan: Care Coordinator mailed out care coordination introduction letter on 3-28. Care Coordinator will do no further outreaches at this time.  Cricket Vega RN  Clinic Care Coordinator  [phone] or [phone]

## 2018-10-09 DIAGNOSIS — F33.1 MODERATE RECURRENT MAJOR DEPRESSION (H): ICD-10-CM

## 2018-10-11 RX ORDER — VENLAFAXINE HYDROCHLORIDE 150 MG/1
CAPSULE, EXTENDED RELEASE ORAL
Qty: 90 CAPSULE | Refills: 0 | Status: SHIPPED | OUTPATIENT
Start: 2018-10-11 | End: 2018-12-04

## 2018-10-11 NOTE — TELEPHONE ENCOUNTER
"Requested Prescriptions   Pending Prescriptions Disp Refills     venlafaxine (EFFEXOR-XR) 150 MG 24 hr capsule [Pharmacy Med Name: VENLAFAXINE HCL ER 150MG CP24] 90 capsule 1     Sig: TAKE 1 CAPSULE BY MOUTH DAILY    Serotonin-Norepinephrine Reuptake Inhibitors  Failed    10/9/2018  2:51 PM       Failed - Blood pressure under 140/90 in past 12 months    BP Readings from Last 3 Encounters:   03/27/18 170/78   11/24/17 132/76   06/20/17 138/79          Failed - PHQ-9 score of less than 5 in past 6 months    PHQ-9 SCORE 5/16/2017 6/14/2017 11/22/2017   Total Score - - -   Total Score MyChart 4 (Minimal depression) - 2 (Minimal depression)   Total Score - 2 2          Failed - Recent (6 mo) or future (30 days) visit within the authorizing provider's specialty    Patient had office visit in the last 6 months or has a visit in the next 30 days with authorizing provider or within the authorizing provider's specialty.  See \"Patient Info\" tab in inbasket, or \"Choose Columns\" in Meds & Orders section of the refill encounter.           Passed - Patient is age 18 or older       Passed - No active pregnancy on record       Passed - Normal serum creatinine on file in past 12 months    Recent Labs   Lab Test  11/27/17   0809   CR  0.87          Passed - No positive pregnancy test in past 12 months        venlafaxine (EFFEXOR-XR) 150 MG 24 hr capsule  Prescription approved per [facility] Refill Protocol. Due for updated PHQ-9    Due for physical 11/24/2018 or after  Ashley Fragoso, RN, BSN     "

## 2018-10-13 ASSESSMENT — PATIENT HEALTH QUESTIONNAIRE - PHQ9: SUM OF ALL RESPONSES TO PHQ QUESTIONS 1-9: 0

## 2018-11-19 DIAGNOSIS — E03.9 HYPOTHYROIDISM, UNSPECIFIED TYPE: ICD-10-CM

## 2018-11-20 RX ORDER — LEVOTHYROXINE SODIUM 88 UG/1
TABLET ORAL
Qty: 90 TABLET | Refills: 0 | Status: SHIPPED | OUTPATIENT
Start: 2018-11-20 | End: 2018-12-04

## 2018-11-27 ENCOUNTER — MYC MEDICAL ADVICE (OUTPATIENT)
Dept: FAMILY MEDICINE | Facility: OTHER | Age: 83
End: 2018-11-27

## 2018-11-29 NOTE — PATIENT INSTRUCTIONS
Preventive Health Recommendations    See your health care provider every year to    Review health changes.     Discuss preventive care.      Review your medicines if your doctor has prescribed any.      You no longer need a yearly Pap test unless you've had an abnormal Pap test in the past 10 years. If you have vaginal symptoms, such as bleeding or discharge, be sure to talk with your provider about a Pap test.      Every 1 to 2 years, have a mammogram.  If you are over 69, talk with your health care provider about whether or not you want to continue having screening mammograms.      Every 10 years, have a colonoscopy. Or, have a yearly FIT test (stool test). These exams will check for colon cancer.       Have a cholesterol test every 5 years, or more often if your doctor advises it.       Have a diabetes test (fasting glucose) every three years. If you are at risk for diabetes, you should have this test more often.       At age 65, have a bone density scan (DEXA) to check for osteoporosis (brittle bone disease).    Shots:    Get a flu shot each year.    Get a tetanus shot every 10 years.    Talk to your doctor about your pneumonia vaccines. There are now two you should receive - Pneumovax (PPSV 23) and Prevnar (PCV 13).    Talk to your pharmacist about the shingles vaccine.    Talk to your doctor about the hepatitis B vaccine.    Nutrition:     Eat at least 5 servings of fruits and vegetables each day.      Eat whole-grain bread, whole-wheat pasta and brown rice instead of white grains and rice.      Get adequate Calcium and Vitamin D.     Lifestyle    Exercise at least 150 minutes a week (30 minutes a day, 5 days a week). This will help you control your weight and prevent disease.      Limit alcohol to one drink per day.      No smoking.       Wear sunscreen to prevent skin cancer.       See your dentist twice a year for an exam and cleaning.      See your eye doctor every 1 to 2 years to screen for conditions  such as glaucoma, macular degeneration and cataracts.    Personalized Prevention Plan  You are due for the preventive services outlined below.  Your care team is available to assist you in scheduling these services.  If you have already completed any of these items, please share that information with your care team to update in your medical record.  Health Maintenance Due   Topic Date Due     FALL RISK ASSESSMENT  05/16/2018     Flu Vaccine (1) 09/01/2018     Discuss Advance Directive Planning  10/01/2018     Microalbumin Lab - yearly  11/24/2018     Thyroid Function Lab (TSH) - yearly  11/27/2018     Basic Metabolic Lab - yearly  11/27/2018     Hemoglobin Lab - yearly  11/27/2018     Cholesterol Lab - yearly  11/27/2018

## 2018-11-29 NOTE — PROGRESS NOTES
"SUBJECTIVE:   Erwin Tolliver is a 83 year old female who presents for Preventive Visit.  Are you in the first 12 months of your Medicare coverage?  No    Annual Wellness Visit     In general, how would you rate your overall health?  Good    Frequency of exercise:  None    Do you usually eat at least 4 servings of fruit and vegetables a day, include whole grains    & fiber and avoid regularly eating high fat or \"junk\" foods?  Yes    Taking medications regularly:  Yes    Ability to successfully perform activities of daily living:  No assistance needed    Home Safety:  No safety concerns identified    Hearing Impairment:  Difficulty following a conversation in a noisy restaurant or crowded room    In the past 6 months, have you been bothered by leaking of urine?  No    In general, how would you rate your overall mental or emotional health?  Good    PHQ-2 Total Score: 0    Additional concerns today:  No    Do you feel safe in your environment? Yes    Do you have a Health Care Directive? Yes: Advance Directive has been received and scanned.    Fall risk  Fallen 2 or more times in the past year?: No  Any fall with injury in the past year?: No    Cognitive Screening   1) Repeat 3 items (Leader, Season, Table)    2) Clock draw: NORMAL  3) 3 item recall: Recalls 3 objects  Results: 3 items recalled: COGNITIVE IMPAIRMENT LESS LIKELY    Mini-CogTM Copyright S Noah. Licensed by the author for use in [facility]; reprinted with permission (jerson@.[facility]). All rights reserved.      Do you have sleep apnea, excessive snoring or daytime drowsiness?: no    Reviewed and updated as needed this visit by clinical staff  Tobacco  Allergies  Meds  Problems  Med Hx  Surg Hx  Fam Hx  Soc Hx          Reviewed and updated as needed this visit by Provider  Allergies  Meds  Problems        Social History   Substance Use Topics     Smoking status: Former Smoker     Packs/day: 0.50     Years: 10.00     Quit date: " 1/1/1966     Smokeless tobacco: Never Used      Comment: no smokers in household     Alcohol use No      Comment: beerx2/ x8per month       Alcohol Use 12/4/2018   If you drink alcohol do you typically have greater than 3 drinks per day OR greater than 7 drinks per week? No   No flowsheet data found.    Current providers sharing in care for this patient include:   Patient Care Team:  Jacqueline Brandt MD as PCP - General    The following health maintenance items are reviewed in Epic and correct as of today:  Health Maintenance   Topic Date Due     INFLUENZA VACCINE (1) 09/01/2018     MICROALBUMIN Q1 YEAR  11/24/2018     TSH Q1 YEAR  11/27/2018     BMP Q1 YR  11/27/2018     HEMOGLOBIN Q1 YR  11/27/2018     LIPID MONITORING Q1 YEAR  11/27/2018     PHQ-9 Q6 MONTHS  04/09/2019     FALL RISK ASSESSMENT  12/04/2019     TETANUS IMMUNIZATION (SYSTEM ASSIGNED)  11/01/2023     ADVANCE DIRECTIVE PLANNING Q5 YRS  12/04/2023     MIGRAINE ACTION PLAN  Completed     DEXA SCAN SCREENING (SYSTEM ASSIGNED)  Completed     PNEUMOCOCCAL  Completed     DEPRESSION ACTION PLAN  Completed       Review of Systems   Constitutional: Negative for chills and fever.   HENT: Negative for congestion, ear pain, hearing loss and sore throat.    Eyes: Negative for pain and visual disturbance.   Respiratory: Negative for cough and shortness of breath.    Cardiovascular: Negative for chest pain, palpitations and peripheral edema.   Gastrointestinal: Negative for abdominal pain, constipation, diarrhea, heartburn, hematochezia and nausea.   Genitourinary: Negative for dysuria, frequency, genital sores, hematuria and urgency.   Musculoskeletal: Negative for arthralgias, joint swelling and myalgias.   Skin: Negative for rash.   Neurological: Negative for dizziness, weakness, headaches and paresthesias.   Psychiatric/Behavioral: Negative for mood changes. The patient is not nervous/anxious.          OBJECTIVE:   /64 (BP Location: Left arm, Patient  "Position: Chair, Cuff Size: Adult Regular)  Pulse 80  Temp 98.9  F (37.2  C) (Temporal)  Resp 14  Ht 5' 1.5\" (1.562 m)  Wt 149 lb (67.6 kg)  SpO2 97%  BMI 27.7 kg/m2 Estimated body mass index is 27.7 kg/(m^2) as calculated from the following:    Height as of this encounter: 5' 1.5\" (1.562 m).    Weight as of this encounter: 149 lb (67.6 kg).  Physical Exam   Constitutional: She is oriented to person, place, and time. She appears well-developed and well-nourished. No distress.   HENT:   Right Ear: Tympanic membrane and external ear normal.   Left Ear: Tympanic membrane and external ear normal.   Nose: Nose normal.   Mouth/Throat: Oropharynx is clear and moist. No oropharyngeal exudate.   Eyes: Conjunctivae are normal. Pupils are equal, round, and reactive to light. Right eye exhibits no discharge. Left eye exhibits no discharge.   Neck: Neck supple. No tracheal deviation present. No thyromegaly present.   Cardiovascular: Normal rate, regular rhythm, S1 normal, S2 normal, normal heart sounds and normal pulses.  Exam reveals no S3, no S4 and no friction rub.    No murmur heard.  Pulmonary/Chest: Effort normal and breath sounds normal. No respiratory distress. She has no wheezes. She has no rales.   Abdominal: Soft. Bowel sounds are normal. She exhibits no mass. There is no hepatosplenomegaly. There is no tenderness.   Musculoskeletal: Normal range of motion. She exhibits no edema.   Lymphadenopathy:     She has no cervical adenopathy.   Neurological: She is alert and oriented to person, place, and time. She has normal strength and normal reflexes. She exhibits normal muscle tone.   Skin: Skin is warm and dry. No rash noted.   Psychiatric: She has a normal mood and affect. Judgment and thought content normal. Cognition and memory are normal.       ASSESSMENT / PLAN:   1. Encounter for routine adult health examination without abnormal findings      2. Need for prophylactic vaccination and inoculation against " influenza    - FLU VACCINE, INCREASED ANTIGEN, PRESV FREE, AGE 65+ [59945]  - ADMIN INFLUENZA (For MEDICARE Patients ONLY) []    3. Hypertension, goal below 150/90  Well controlled, has history of SVT and remains on metoprolol.    - Hemoglobin  - Lipid panel reflex to direct LDL Non-fasting  - Albumin Random Urine Quantitative with Creat Ratio  - TSH WITH FREE T4 REFLEX  - metoprolol succinate ER (TOPROL-XL) 100 MG 24 hr tablet; Take 1 tablet (100 mg) by mouth daily  Dispense: 90 tablet; Refill: 3  - Comprehensive metabolic panel    4. Moderate recurrent major depression (H)  Mood stable, refills given.    - buPROPion (WELLBUTRIN XL) 300 MG 24 hr tablet; Take 1 tablet (300 mg) by mouth every morning  Dispense: 90 tablet; Refill: 3  - venlafaxine (EFFEXOR-XR) 150 MG 24 hr capsule; TAKE 1 CAPSULE BY MOUTH DAILY  Dispense: 90 capsule; Refill: 3    5. Hypothyroidism, unspecified type  Labs drawn.    - levothyroxine (SYNTHROID/LEVOTHROID) 88 MCG tablet; Take 1 tablet (88 mcg) by mouth daily  Dispense: 90 tablet; Refill: 3    6. Hyperlipidemia, unspecified hyperlipidemia type  Remains on statin.    - Lipid panel reflex to direct LDL Non-fasting  - Comprehensive metabolic panel  - simvastatin (ZOCOR) 20 MG tablet; TAKE ONE TABLET BY MOUTH AT BEDTIME  Dispense: 90 tablet; Refill: 3    7. Insomnia, unspecified type  Doing well with 100 mg at night.    - traZODone (DESYREL) 100 MG tablet; Take 1 tablet (100 mg) by mouth At Bedtime  Dispense: 90 tablet; Refill: 3    8. Gastroesophageal reflux disease without esophagitis  Uses proton pump inhibitor a couple times a week.    9. Migraine with aura and without status migrainosus, not intractable  Feels Imitrex is helpful, declines need for refill at this time.      End of Life Planning:  Patient currently has an advanced directive: No.  I have verified the patient's ablity to prepare an advanced directive/make health care decisions.      COUNSELING:  Reviewed preventive  "health counseling, as reflected in patient instructions    BP Readings from Last 1 Encounters:   12/04/18 104/64     Estimated body mass index is 27.7 kg/(m^2) as calculated from the following:    Height as of this encounter: 5' 1.5\" (1.562 m).    Weight as of this encounter: 149 lb (67.6 kg).           reports that she quit smoking about 52 years ago. She has a 5.00 pack-year smoking history. She has never used smokeless tobacco.      Appropriate preventive services were discussed with this patient, including applicable screening as appropriate for cardiovascular disease, diabetes, osteopenia/osteoporosis, and glaucoma.  As appropriate for age/gender, discussed screening for colorectal cancer, prostate cancer, breast cancer, and cervical cancer. Checklist reviewing preventive services available has been given to the patient.    Reviewed patients plan of care and provided an AVS. The Basic Care Plan (routine screening as documented in Health Maintenance) for Erwin meets the Care Plan requirement. This Care Plan has been established and reviewed with the Patient.    Counseling Resources:  ATP IV Guidelines  Pooled Cohorts Equation Calculator  Breast Cancer Risk Calculator  FRAX Risk Assessment  ICSI Preventive Guidelines  Dietary Guidelines for Americans, 2010  USDA's MyPlate  ASA Prophylaxis  Lung CA Screening    Jacqueline Brandt MD  [facility]"

## 2018-12-04 ENCOUNTER — OFFICE VISIT (OUTPATIENT)
Dept: FAMILY MEDICINE | Facility: OTHER | Age: 83
End: 2018-12-04
Payer: COMMERCIAL

## 2018-12-04 VITALS
TEMPERATURE: 98.9 F | HEIGHT: 62 IN | HEART RATE: 80 BPM | DIASTOLIC BLOOD PRESSURE: 64 MMHG | BODY MASS INDEX: 27.42 KG/M2 | RESPIRATION RATE: 14 BRPM | OXYGEN SATURATION: 97 % | WEIGHT: 149 LBS | SYSTOLIC BLOOD PRESSURE: 104 MMHG

## 2018-12-04 DIAGNOSIS — E03.9 HYPOTHYROIDISM, UNSPECIFIED TYPE: ICD-10-CM

## 2018-12-04 DIAGNOSIS — G43.109 MIGRAINE WITH AURA AND WITHOUT STATUS MIGRAINOSUS, NOT INTRACTABLE: ICD-10-CM

## 2018-12-04 DIAGNOSIS — E78.5 HYPERLIPIDEMIA, UNSPECIFIED HYPERLIPIDEMIA TYPE: ICD-10-CM

## 2018-12-04 DIAGNOSIS — G47.00 INSOMNIA, UNSPECIFIED TYPE: ICD-10-CM

## 2018-12-04 DIAGNOSIS — F33.1 MODERATE RECURRENT MAJOR DEPRESSION (H): ICD-10-CM

## 2018-12-04 DIAGNOSIS — I10 HYPERTENSION, GOAL BELOW 150/90: ICD-10-CM

## 2018-12-04 DIAGNOSIS — Z23 NEED FOR PROPHYLACTIC VACCINATION AND INOCULATION AGAINST INFLUENZA: ICD-10-CM

## 2018-12-04 DIAGNOSIS — K21.9 GASTROESOPHAGEAL REFLUX DISEASE WITHOUT ESOPHAGITIS: ICD-10-CM

## 2018-12-04 DIAGNOSIS — Z00.00 ENCOUNTER FOR ROUTINE ADULT HEALTH EXAMINATION WITHOUT ABNORMAL FINDINGS: Primary | ICD-10-CM

## 2018-12-04 LAB
ALBUMIN SERPL-MCNC: 3.7 G/DL (ref 3.4–5)
ALP SERPL-CCNC: 69 U/L (ref 40–150)
ALT SERPL W P-5'-P-CCNC: 26 U/L (ref 0–50)
ANION GAP SERPL CALCULATED.3IONS-SCNC: 6 MMOL/L (ref 3–14)
AST SERPL W P-5'-P-CCNC: 19 U/L (ref 0–45)
BILIRUB SERPL-MCNC: 0.6 MG/DL (ref 0.2–1.3)
BUN SERPL-MCNC: 14 MG/DL (ref 7–30)
CALCIUM SERPL-MCNC: 8.4 MG/DL (ref 8.5–10.1)
CHLORIDE SERPL-SCNC: 104 MMOL/L (ref 94–109)
CHOLEST SERPL-MCNC: 180 MG/DL
CO2 SERPL-SCNC: 27 MMOL/L (ref 20–32)
CREAT SERPL-MCNC: 0.91 MG/DL (ref 0.52–1.04)
CREAT UR-MCNC: 48 MG/DL
GFR SERPL CREATININE-BSD FRML MDRD: 59 ML/MIN/1.7M2
GLUCOSE SERPL-MCNC: 99 MG/DL (ref 70–99)
HDLC SERPL-MCNC: 56 MG/DL
HGB BLD-MCNC: 13.3 G/DL (ref 11.7–15.7)
LDLC SERPL CALC-MCNC: 77 MG/DL
MICROALBUMIN UR-MCNC: 11 MG/L
MICROALBUMIN/CREAT UR: 22.87 MG/G CR (ref 0–25)
NONHDLC SERPL-MCNC: 124 MG/DL
POTASSIUM SERPL-SCNC: 4.4 MMOL/L (ref 3.4–5.3)
PROT SERPL-MCNC: 7.6 G/DL (ref 6.8–8.8)
SODIUM SERPL-SCNC: 137 MMOL/L (ref 133–144)
TRIGL SERPL-MCNC: 233 MG/DL
TSH SERPL DL<=0.005 MIU/L-ACNC: 3.14 MU/L (ref 0.4–4)

## 2018-12-04 PROCEDURE — G0439 PPPS, SUBSEQ VISIT: HCPCS | Performed by: FAMILY MEDICINE

## 2018-12-04 PROCEDURE — 80061 LIPID PANEL: CPT | Performed by: FAMILY MEDICINE

## 2018-12-04 PROCEDURE — 36415 COLL VENOUS BLD VENIPUNCTURE: CPT | Performed by: FAMILY MEDICINE

## 2018-12-04 PROCEDURE — 84443 ASSAY THYROID STIM HORMONE: CPT | Performed by: FAMILY MEDICINE

## 2018-12-04 PROCEDURE — 80053 COMPREHEN METABOLIC PANEL: CPT | Performed by: FAMILY MEDICINE

## 2018-12-04 PROCEDURE — G0008 ADMIN INFLUENZA VIRUS VAC: HCPCS | Performed by: FAMILY MEDICINE

## 2018-12-04 PROCEDURE — 85018 HEMOGLOBIN: CPT | Performed by: FAMILY MEDICINE

## 2018-12-04 PROCEDURE — 90662 IIV NO PRSV INCREASED AG IM: CPT | Performed by: FAMILY MEDICINE

## 2018-12-04 PROCEDURE — 82043 UR ALBUMIN QUANTITATIVE: CPT | Performed by: FAMILY MEDICINE

## 2018-12-04 RX ORDER — VENLAFAXINE HYDROCHLORIDE 150 MG/1
CAPSULE, EXTENDED RELEASE ORAL
Qty: 90 CAPSULE | Refills: 3 | Status: SHIPPED | OUTPATIENT
Start: 2018-12-04 | End: 2019-10-25

## 2018-12-04 RX ORDER — METOPROLOL SUCCINATE 100 MG/1
100 TABLET, EXTENDED RELEASE ORAL DAILY
Qty: 90 TABLET | Refills: 3 | Status: SHIPPED | OUTPATIENT
Start: 2018-12-04 | End: 2019-10-25

## 2018-12-04 RX ORDER — BUPROPION HYDROCHLORIDE 300 MG/1
300 TABLET ORAL EVERY MORNING
Qty: 90 TABLET | Refills: 3 | Status: SHIPPED | OUTPATIENT
Start: 2018-12-04 | End: 2019-10-25

## 2018-12-04 RX ORDER — TRAZODONE HYDROCHLORIDE 100 MG/1
100 TABLET ORAL AT BEDTIME
Qty: 90 TABLET | Refills: 3 | Status: SHIPPED | OUTPATIENT
Start: 2018-12-04 | End: 2019-10-25

## 2018-12-04 RX ORDER — SIMVASTATIN 20 MG
TABLET ORAL
Qty: 90 TABLET | Refills: 3 | Status: SHIPPED | OUTPATIENT
Start: 2018-12-04 | End: 2019-10-25

## 2018-12-04 RX ORDER — LEVOTHYROXINE SODIUM 88 UG/1
88 TABLET ORAL DAILY
Qty: 90 TABLET | Refills: 3 | Status: SHIPPED | OUTPATIENT
Start: 2018-12-04 | End: 2019-10-25

## 2018-12-04 ASSESSMENT — ACTIVITIES OF DAILY LIVING (ADL): CURRENT_FUNCTION: NO ASSISTANCE NEEDED

## 2018-12-04 ASSESSMENT — ENCOUNTER SYMPTOMS
EYE PAIN: 0
HEMATURIA: 0
FEVER: 0
CHILLS: 0
FREQUENCY: 0
NERVOUS/ANXIOUS: 0
DIZZINESS: 0
COUGH: 0
DIARRHEA: 0
HEADACHES: 0
WEAKNESS: 0
DYSURIA: 0
SHORTNESS OF BREATH: 0
HEMATOCHEZIA: 0
MYALGIAS: 0
HEARTBURN: 0
PALPITATIONS: 0
SORE THROAT: 0
ABDOMINAL PAIN: 0
JOINT SWELLING: 0
CONSTIPATION: 0
PARESTHESIAS: 0
NAUSEA: 0
ARTHRALGIAS: 0

## 2018-12-04 NOTE — PROGRESS NOTES
Injectable Influenza Immunization Documentation    1.  Is the person to be vaccinated sick today?   No    2. Does the person to be vaccinated have an allergy to a component   of the vaccine?   No  Egg Allergy Algorithm Link    3. Has the person to be vaccinated ever had a serious reaction   to influenza vaccine in the past?   No    4. Has the person to be vaccinated ever had Guillain-Barré syndrome?   No    Form completed by Amanda Madsen CMA     Prior to injection verified patient identity using patient's name and date of birth.  Due to injection administration, patient instructed to remain in clinic for 15 minutes  afterwards, and to report any adverse reaction to me immediately.

## 2019-01-11 ENCOUNTER — TELEPHONE (OUTPATIENT)
Dept: FAMILY MEDICINE | Facility: OTHER | Age: 84
End: 2019-01-11

## 2019-01-11 DIAGNOSIS — K21.9 GASTROESOPHAGEAL REFLUX DISEASE WITHOUT ESOPHAGITIS: ICD-10-CM

## 2019-01-11 NOTE — TELEPHONE ENCOUNTER
Reason for Call:  Other prescription    Detailed comments: [facility] Pharm[facility] called clinic. They need clarification on Omeprazole rx that was just faxed today. Please call pharmacy.     Phone Number [phone], option [phone]    Best Time: any    Can we leave a detailed message on this number? Not Applicable    Call taken on 1/11/2019 at 12:51 PM by Radha Oshea

## 2019-01-11 NOTE — TELEPHONE ENCOUNTER
Prilosec:  Prescription approved per [facility] Refill Protocol.    Capri Shah, RN, BSN

## 2019-01-11 NOTE — TELEPHONE ENCOUNTER
"Spoke with [facility] - they are having issues with the sig. There is two sets of directions and they need to know which one.  Sonia Mitchell, [facility]      Sig sent as \"Take 1 capsule (20 mg) by mouth every other day TAKE ONE CAPSULE BY MOUTH EVERY DAY AS NEEDED - Oral\"  "

## 2019-01-22 ENCOUNTER — MYC MEDICAL ADVICE (OUTPATIENT)
Dept: FAMILY MEDICINE | Facility: OTHER | Age: 84
End: 2019-01-22

## 2019-01-23 NOTE — PROGRESS NOTES
[address]  [facility] [address]  [phone]  Dept: [phone]    PRE-OP EVALUATION:  Today's date: 2019    Erwin Tolliver (: 1935) presents for pre-operative evaluation assessment as requested by Dr. Corey.  She requires evaluation and anesthesia risk assessment prior to undergoing surgery/procedure for treatment of  .    Fax number for surgical facility: [phone]  Primary Physician: Jacqueline Brandt  Type of Anesthesia Anticipated: General    Patient has a Health Care Directive or Living Will:  YES     Preop Questions 2019   Who is doing your surgery? jose Bland   What are you having done? Shoulder replacement   Date of Surgery/Procedure: 19   Facility or Hospital where procedure[facility] be performed[facility] [phone]   1.  Do you have a history of Heart attack, stroke, stent, coronary bypass surgery, or other heart surgery? No   2.  Do you ever have any pain or discomfort in your chest? No   3.  Do you have a history of  Heart Failure? No   4.   Are you troubled by shortness of breath when:  walking on a level surface, or up a slight hill, or at night? No   5.  Do you currently have a cold, bronchitis or other respiratory infection? No   6.  Do you have a cough, shortness of breath, or wheezing? No   7.  Do you sometimes get pains in the calves of your legs when you walk? No   8. Do you or anyone in your family have previous history of blood clots? No   9.  Do you or does anyone in your family have a serious bleeding problem such as prolonged bleeding following surgeries or cuts? No   10. Have you ever had problems with anemia or been told to take iron pills? No   11. Have you had any abnormal blood loss such as black, tarry or bloody stools, or abnormal vaginal bleeding? No   12. Have you ever had a blood transfusion? No   13. Have you or any of your relatives ever had problems with anesthesia? No   14. Do you have  sleep apnea, excessive snoring or daytime drowsiness? No   15. Do you have any prosthetic heart valves? No   16. Do you have prosthetic joints? No   17. Is there any chance that you may be pregnant? No         HPI:     HPI related to upcoming procedure: chronic left shoulder pain      DEPRESSION - Patient has a long history of Depression of moderate severity requiring medication for control with recent symptoms being stable.                                                                                                                                                            .  HYPERLIPIDEMIA - Patient has a long history of significant Hyperlipidemia requiring medication for treatment with recent good control.                                                                                                 .  HYPERTENSION - Patient has longstanding history of HTN , currently denies any symptoms referable to elevated blood pressure. Specifically denies chest pain, palpitations, dyspnea, orthopnea, PND or peripheral edema. Blood pressure readings have been in normal range. Current medication regimen is as listed below. Patient denies any side effects of medication.                                                                                                                                                                                          .    MEDICAL HISTORY:     Patient Active Problem List    Diagnosis Date Noted     Other chronic sinusitis 03/01/2017     Priority: Medium     Penicillin allergy 03/01/2017     Priority: Medium     Hypertension, goal below 150/90 05/05/2015     Priority: Medium     Macular degeneration (senile) of retina 04/04/2014     Priority: Medium     Advanced directives, counseling/discussion 10/01/2013     Priority: Medium     Moderate recurrent major depression (H) 10/01/2013     Priority: Medium     CKD (chronic kidney disease) stage 3, GFR 30-59 ml/min (H) 08/15/2012     Priority:  Medium     Atopic rhinitis 01/09/2012     Priority: Medium     GERD (gastroesophageal reflux disease) 08/02/2011     Priority: Medium     Osteopenia 11/15/2010     Priority: Medium     Hyperlipidemia, unspecified 10/31/2010     Priority: Medium     Hypothyroidism 12/14/2006     Priority: Medium     Migraine with aura      Priority: Medium      Past Medical History:   Diagnosis Date     Arthritis      Depressive disorder      Depressive disorder, not elsewhere classified      Hepatitis, unspecified 1993    From [facility]     Hypertension      Migraine with aura, without mention of intractable migraine without mention of status migrainosus      Need for prophylactic hormone replacement therapy (postmenopausal)      Palpitations      SVT (supraventricular tachycardia) (H) 10/8/2012     Thyroid disease      Unspecified essential hypertension      Past Surgical History:   Procedure Laterality Date     APPENDECTOMY       BACK SURGERY      bulged disc     C TOTAL ABDOM HYSTERECTOMY      Hysterectomy, Total Abdominal and BSO -benign - age 41     COLONOSCOPY       ENT SURGERY       EYE SURGERY       HC COLONOSCOPY W BIOPSY  11/07/06     HC KNEE SCOPE, DIAGNOSTIC      Arthroscopy, Knee     HC REDUCTION OF LARGE BREAST       ORTHOPEDIC SURGERY       SEPTOPLASTY N/A 6/20/2017    Procedure: SEPTOPLASTY;  Septoplasty, Submucosal resection of the turbinates;  Surgeon: Edwin Cox MD;  Location:  OR     Current Outpatient Medications   Medication Sig Dispense Refill     ACETAMINOPHEN PO        buPROPion (WELLBUTRIN XL) 300 MG 24 hr tablet Take 1 tablet (300 mg) by mouth every morning 90 tablet 3     ipratropium (ATROVENT) 0.03 % spray Spray 2 sprays into both nostrils 3 times daily 1 Box 3     levothyroxine (SYNTHROID/LEVOTHROID) 88 MCG tablet Take 1 tablet (88 mcg) by mouth daily 90 tablet 3     metoprolol succinate ER (TOPROL-XL) 100 MG 24 hr tablet Take 1 tablet (100 mg) by mouth daily 90 tablet 3     Multiple  Vitamins-Minerals (PRESERVISION AREDS PO) Take 1 tablet by mouth daily       omeprazole (PRILOSEC) 20 MG DR capsule Take 1 capsule (20 mg) by mouth every other day 45 capsule 3     ondansetron (ZOFRAN-ODT) 4 MG ODT tab Take 1-2 tablets (4-8 mg) by mouth every 8 hours as needed for nausea Dissolve ON the tongue. 10 tablet 0     Pseudoephedrine-Ibuprofen (ADVIL COLD/SINUS PO)        simvastatin (ZOCOR) 20 MG tablet TAKE ONE TABLET BY MOUTH AT BEDTIME 90 tablet 3     SUMAtriptan (IMITREX) 25 MG tablet Take 1-2 tablets (25-50 mg) by mouth at onset of headache for migraine May repeat in 2 hours. Max 8 tablets/24 hours. 9 tablet 1     traZODone (DESYREL) 100 MG tablet Take 1 tablet (100 mg) by mouth At Bedtime 90 tablet 3     venlafaxine (EFFEXOR-XR) 150 MG 24 hr capsule TAKE 1 CAPSULE BY MOUTH DAILY 90 capsule 3     nitroGLYcerin (NITROSTAT) 0.4 MG sublingual tablet Place 1 tablet (0.4 mg) under the tongue once for 1 dose For chest pain place 1 tablet under the tongue every 5 minutes for 3 doses. If symptoms persist 5 minutes after 1st dose call 911. 1 tablet 0     OTC products: None, except as noted above    Allergies   Allergen Reactions     Aspirin      headaches,tremors,nausea     Augmentin [Amoxicillin-Pot Clavulanate] Nausea and Vomiting      Latex Allergy: NO    Social History     Tobacco Use     Smoking status: Former Smoker     Packs/day: 0.50     Years: 10.00     Pack years: 5.00     Last attempt to quit: 1966     Years since quittin.1     Smokeless tobacco: Never Used     Tobacco comment: no smokers in household   Substance Use Topics     Alcohol use: No     Alcohol/week: 1.0 oz     Comment: beerx2/ x8per month     History   Drug Use No       REVIEW OF SYSTEMS:   CONSTITUTIONAL: NEGATIVE for fever, chills, change in weight  INTEGUMENTARY/SKIN: NEGATIVE for worrisome rashes, moles or lesions  EYES: NEGATIVE for vision changes or irritation  ENT/MOUTH: NEGATIVE for ear, mouth and throat problems  RESP:  "NEGATIVE for significant cough or SOB  CV: NEGATIVE for chest pain, palpitations or peripheral edema  GI: NEGATIVE for nausea, abdominal pain, heartburn, or change in bowel habits  : NEGATIVE for frequency, dysuria, or hematuria  MUSCULOSKELETAL: left shoulder pain  NEURO: NEGATIVE for weakness, dizziness or paresthesias  ENDOCRINE: NEGATIVE for temperature intolerance, skin/hair changes  HEME: NEGATIVE for bleeding problems  PSYCHIATRIC: NEGATIVE for changes in mood or affect    EXAM:   /74 (BP Location: Left arm, Patient Position: Chair, Cuff Size: Adult Large)   Pulse 72   Temp 98.6  F (37  C) (Temporal)   Resp 16   Ht 1.562 m (5' 1.5\")   Wt 68.5 kg (151 lb)   SpO2 97%   BMI 28.07 kg/m      GENERAL APPEARANCE: healthy, alert and no distress     EYES: EOMI, PERRL     HENT: bilateral hearing aids, ear canals and TM's normal and nose and mouth without ulcers or lesions     NECK: no adenopathy, no asymmetry, masses, or scars and thyroid normal to palpation     RESP: lungs clear to auscultation - no rales, rhonchi or wheezes     CV: regular rates and rhythm, normal S1 S2, no S3 or S4 and no murmur, click or rub     ABDOMEN:  soft, nontender, no HSM or masses and bowel sounds normal     MS: left shoulder with limited extension, rotation and abduction     SKIN: no suspicious lesions or rashes     NEURO: Normal strength and tone, sensory exam grossly normal, mentation intact and speech normal     PSYCH: mentation appears normal. and affect normal/bright     LYMPHATICS: No cervical adenopathy    DIAGNOSTICS:   EKG: Normal Sinus Rhythm, normal axis, normal intervals, no acute ST/T changes c/w ischemia, no LVH by voltage criteria    Recent Labs   Lab Test 12/04/18  1430 11/27/17  0809 01/31/17  1007  06/22/11  1050   HGB 13.3 12.9 13.0   < > 12.5   PLT  --  213 247   < > 222   INR  --   --   --   --  0.90    139 139   < >  --    POTASSIUM 4.4 3.9 4.0   < >  --    CR 0.91 0.87 0.95   < >  --     < > = " values in this interval not displayed.        IMPRESSION:   Reason for surgery/procedure: left chronic shoulder pain    The proposed surgical procedure is considered INTERMEDIATE risk.    REVISED CARDIAC RISK INDEX  The patient has the following serious cardiovascular risks for perioperative complications such as (MI, PE, VFib and 3  AV Block):  No serious cardiac risks  INTERPRETATION: 0 risks: Class I (very low risk - 0.4% complication rate)    The patient has the following additional risks for perioperative complications:  No identified additional risks      ICD-10-CM    1. Preop general physical exam Z01.818 CBC with platelets     MRSA MSSA Presurgical Screen   2. Chronic left shoulder pain M25.512     G89.29        RECOMMENDATIONS:       APPROVAL GIVEN to proceed with proposed procedure, without further diagnostic evaluation       Signed Electronically by: Jacqueline Brandt MD    Copy of this evaluation report is provided to requesting physician.    [place] Preop Guidelines    Revised Cardiac Risk Index

## 2019-01-25 ENCOUNTER — OFFICE VISIT (OUTPATIENT)
Dept: FAMILY MEDICINE | Facility: OTHER | Age: 84
End: 2019-01-25
Payer: MEDICARE

## 2019-01-25 VITALS
BODY MASS INDEX: 27.79 KG/M2 | HEART RATE: 72 BPM | DIASTOLIC BLOOD PRESSURE: 74 MMHG | HEIGHT: 62 IN | OXYGEN SATURATION: 97 % | RESPIRATION RATE: 16 BRPM | WEIGHT: 151 LBS | SYSTOLIC BLOOD PRESSURE: 136 MMHG | TEMPERATURE: 98.6 F

## 2019-01-25 DIAGNOSIS — Z22.321 MSSA (METHICILLIN-SUSCEPTIBLE STAPHYLOCOCCUS AUREUS) COLONIZATION: ICD-10-CM

## 2019-01-25 DIAGNOSIS — F33.1 MODERATE RECURRENT MAJOR DEPRESSION (H): ICD-10-CM

## 2019-01-25 DIAGNOSIS — G89.29 CHRONIC LEFT SHOULDER PAIN: ICD-10-CM

## 2019-01-25 DIAGNOSIS — Z01.818 PREOP GENERAL PHYSICAL EXAM: Primary | ICD-10-CM

## 2019-01-25 DIAGNOSIS — M25.512 CHRONIC LEFT SHOULDER PAIN: ICD-10-CM

## 2019-01-25 LAB
ERYTHROCYTE [DISTWIDTH] IN BLOOD BY AUTOMATED COUNT: 13.1 % (ref 10–15)
HCT VFR BLD AUTO: 40.4 % (ref 35–47)
HGB BLD-MCNC: 13.2 G/DL (ref 11.7–15.7)
MCH RBC QN AUTO: 32 PG (ref 26.5–33)
MCHC RBC AUTO-ENTMCNC: 32.7 G/DL (ref 31.5–36.5)
MCV RBC AUTO: 98 FL (ref 78–100)
PLATELET # BLD AUTO: 226 10E9/L (ref 150–450)
RBC # BLD AUTO: 4.13 10E12/L (ref 3.8–5.2)
WBC # BLD AUTO: 7.4 10E9/L (ref 4–11)

## 2019-01-25 PROCEDURE — 40000868 ZZHCL STATISTIC MRSA/MSSA PRESURGICAL SCREEN ID: Performed by: FAMILY MEDICINE

## 2019-01-25 PROCEDURE — 85027 COMPLETE CBC AUTOMATED: CPT | Performed by: FAMILY MEDICINE

## 2019-01-25 PROCEDURE — 99214 OFFICE O/P EST MOD 30 MIN: CPT | Performed by: FAMILY MEDICINE

## 2019-01-25 PROCEDURE — 36415 COLL VENOUS BLD VENIPUNCTURE: CPT | Performed by: FAMILY MEDICINE

## 2019-01-25 PROCEDURE — 40000869 ZZHCL STATISTIC MRSA/MSSA PRESURGICAL SCREEN CULTURE: Performed by: FAMILY MEDICINE

## 2019-01-25 ASSESSMENT — MIFFLIN-ST. JEOR: SCORE: 1090.24

## 2019-01-26 LAB
BACTERIA SPEC CULT: ABNORMAL
SPECIMEN SOURCE: ABNORMAL

## 2019-01-27 ENCOUNTER — NURSE TRIAGE (OUTPATIENT)
Dept: NURSING | Facility: CLINIC | Age: 84
End: 2019-01-27

## 2019-01-27 NOTE — TELEPHONE ENCOUNTER
Tammy with [facility] calling regarding recent Rx for Bactroban 2% nasal ointment.  It is cost prohibitive for [facility] to fill and they are requesting a substitute ointment.    Paged on call provider for [facility] to speak to pharmacy directly.  Dr. Albert Escalante is on call, page sent @ 1:39 pm via smart web.    Caller advised to call back if they have not heard back from on call provider within 20 minutes.  Caller appears to understand directives and agrees with plan.    Cassandra Singh, RN  [facility]

## 2019-01-28 ENCOUNTER — TELEPHONE (OUTPATIENT)
Dept: FAMILY MEDICINE | Facility: OTHER | Age: 84
End: 2019-01-28

## 2019-01-28 DIAGNOSIS — Z22.321 MSSA (METHICILLIN-SUSCEPTIBLE STAPHYLOCOCCUS AUREUS) COLONIZATION: Primary | ICD-10-CM

## 2019-01-28 NOTE — TELEPHONE ENCOUNTER
Please call [facility] to clarify the rx for the mupirocin nasal ointment.  There are a few questions they have.  Please call back asap.  thanks

## 2019-01-28 NOTE — TELEPHONE ENCOUNTER
Contacted [facility] for clarification.  They stated the Nasal Ointment is very expensive for the patient so they are wondering if the Rx can be changed to Topical Ointment? Also please clarify the directions as it states to spray vs apply?

## 2019-01-29 RX ORDER — MUPIROCIN 20 MG/G
OINTMENT TOPICAL 2 TIMES DAILY
Qty: 1 G | Refills: 0 | Status: SHIPPED | OUTPATIENT
Start: 2019-01-29 | End: 2019-02-03

## 2019-02-06 ENCOUNTER — MYC MEDICAL ADVICE (OUTPATIENT)
Dept: FAMILY MEDICINE | Facility: OTHER | Age: 84
End: 2019-02-06

## 2019-02-06 NOTE — TELEPHONE ENCOUNTER
Did not have instructions to hold any meds on day of surgery and no meds with obvious changes needed. Her statement is likely correct.  Jasmina Perez MD

## 2019-09-27 ENCOUNTER — HEALTH MAINTENANCE LETTER (OUTPATIENT)
Age: 84
End: 2019-09-27

## 2019-10-02 ENCOUNTER — MYC MEDICAL ADVICE (OUTPATIENT)
Dept: FAMILY MEDICINE | Facility: OTHER | Age: 84
End: 2019-10-02

## 2019-10-22 NOTE — PROGRESS NOTES
Subjective     Erwin Tolliver is a 84 year old female who presents to clinic today for the following health issues:    History of Present Illness        CKD:   NSAID use::  Naproxen (Anaprox, Naprosyn, Naprelan)    Mental Health Follow-up:  Patient presents to follow-up on Depression.Patient's depression since last visit has been:  Good  The patient is not having other symptoms associated with depression.      Any significant life events: No  Patient is not feeling anxious or having panic attacks.  Patient has no concerns about alcohol or drug use.     Social History  Tobacco Use    Smoking status: Former Smoker      Packs/day: 0.50      Years: 10.00      Pack years: 5      Quit date: 1966      Years since quittin.8    Smokeless tobacco: Never Used    Tobacco comment: no smokers in household  Alcohol use: No    Alcohol/week: 1.7 standard drinks    Comment: beerx2/ x8per month  Drug use: No      Today's PHQ-9         PHQ-9 Total Score:         PHQ-9 Q9 Thoughts of better off dead/self-harm past 2 weeks :       Thoughts of suicide or self harm:      Self-harm Plan:        Self-harm Action:          Safety concerns for self or others:           Hyperlipidemia:  She presents for follow up of hyperlipidemia.  She is taking medication to lower cholesterol. She is not having myalgia or other side effects to statin medications.She is not reporting shortness of breath, increased sweating or nausea with activity, left-sided neck or arm pain, chest pain or pressure, or pain in calves when walking 1-2 blocks.    Hypertension: She presents for follow up of hypertension.  She does not check blood pressure  regularly outside of the clinic. Outpatient blood pressures have not been over 140/90. She follows a low salt diet.     Hypothyroidism:     Since last visit, patient describes the following symptoms::  None    Weight gain::  No weight gain    Weight loss::  No weight loss      Patient Active Problem List    Diagnosis     Migraine with aura     Hypothyroidism     Hyperlipidemia, unspecified     Osteopenia     GERD (gastroesophageal reflux disease)     Atopic rhinitis     CKD (chronic kidney disease) stage 3, GFR 30-59 ml/min (H)     Advanced directives, counseling/discussion     Moderate recurrent major depression (H)     Macular degeneration (senile) of retina     Hypertension, goal below 150/90     Other chronic sinusitis     Penicillin allergy     Past Surgical History:   Procedure Laterality Date     APPENDECTOMY       BACK SURGERY      bulged disc     C TOTAL ABDOM HYSTERECTOMY      Hysterectomy, Total Abdominal and BSO -benign - age 41     COLONOSCOPY       ENT SURGERY       EYE SURGERY       HC COLONOSCOPY W BIOPSY  06     HC KNEE SCOPE, DIAGNOSTIC      Arthroscopy, Knee     HC REDUCTION OF LARGE BREAST       ORTHOPEDIC SURGERY       SEPTOPLASTY N/A 2017    Procedure: SEPTOPLASTY;  Septoplasty, Submucosal resection of the turbinates;  Surgeon: Edwin Cox MD;  Location: [facility]     SHOULDER SURGERY  2019       Social History     Tobacco Use     Smoking status: Former Smoker     Packs/day: 0.50     Years: 10.00     Pack years: 5.00     Last attempt to quit: 1966     Years since quittin.8     Smokeless tobacco: Never Used     Tobacco comment: no smokers in household   Substance Use Topics     Alcohol use: No     Alcohol/week: 1.7 standard drinks     Comment: beerx2/ x8per month     Family History   Problem Relation Age of Onset     Alzheimer Disease Mother      Arthritis Sister          at 72 years     Lipids Sister      Osteoporosis Sister         osteoporosis     Genetic Disorder Sister         down syndrome     Alzheimer Disease Sister      Thyroid Disease Sister      Cardiovascular Brother          at 72 years of CHF           Reviewed and updated as needed this visit by Provider  Allergies  Meds  Problems         Review of Systems   CONSTITUTIONAL: NEGATIVE  "for fever, chills, change in weight  RESP: NEGATIVE for significant cough or shortness of breath  CV: NEGATIVE for chest pain, palpitations or peripheral edema      Objective    /80 (BP Location: Right arm, Patient Position: Chair, Cuff Size: Adult Regular)   Pulse 71   Temp 98.8  F (37.1  C) (Temporal)   Resp 14   Ht 1.562 m (5' 1.5\")   Wt 67.1 kg (148 lb)   SpO2 97%   BMI 27.51 kg/m    Body mass index is 27.51 kg/m .  Physical Exam   Gen: no apparent distress  Chest: clear to auscultation without wheeze, rale or rhonchi  Cor: regular rate and rhythm without murmur  Ext: warm and dry without edema  Psych: Alert and oriented times 3; coherent speech, normal   rate and volume, able to articulate logical thoughts, able   to abstract reason, no tangential thoughts, no hallucinations   or delusions  Her affect is neutral        Assessment & Plan     1. Hypertension, goal below 150/90  Well-controlled.  Refills are given.  Labs are drawn.    - metoprolol succinate ER (TOPROL-XL) 100 MG 24 hr tablet; Take 1 tablet (100 mg) by mouth daily  Dispense: 90 tablet; Refill: 3  - **Comprehensive metabolic panel FUTURE anytime  - Albumin Random Urine Quantitative with Creat Ratio    2. Hyperlipidemia, unspecified hyperlipidemia type  Remains on a statin.  Refills are given.  Labs are drawn.    - simvastatin (ZOCOR) 20 MG tablet; TAKE ONE TABLET BY MOUTH AT BEDTIME  Dispense: 90 tablet; Refill: 3  - Lipid panel reflex to direct LDL Non-fasting  - **Comprehensive metabolic panel FUTURE anytime    3. CKD (chronic kidney disease) stage 3, GFR 30-59 ml/min (H)  Discussed avoidance of NSAIDs.  Encouraged her to take Tylenol instead of naproxen.  I declined to refill her naproxen.      4. Hypothyroidism, unspecified type  Labs are drawn.  Refills are given.    - levothyroxine (SYNTHROID/LEVOTHROID) 88 MCG tablet; Take 1 tablet (88 mcg) by mouth daily  Dispense: 90 tablet; Refill: 3  - **TSH with free T4 reflex FUTURE " "anytime    5. Moderate recurrent major depression (H)  She feels her mood is stable.  She does not want to consider dose decrease.  Refills are given.    - buPROPion (WELLBUTRIN XL) 300 MG 24 hr tablet; Take 1 tablet (300 mg) by mouth every morning  Dispense: 90 tablet; Refill: 3  - venlafaxine (EFFEXOR-XR) 150 MG 24 hr capsule; TAKE 1 CAPSULE BY MOUTH DAILY  Dispense: 90 capsule; Refill: 3    6. Insomnia, unspecified type  She has been on this dose for a long time and states that it works well for her and she feels refreshed in the morning.  I encouraged her to try half a tablet at night instead of the full tablet however she is concerned that she will worry about her sleep.  Discussed that I will refill it at her current dose but I would still like her to try a decrease at some point.    - traZODone (DESYREL) 100 MG tablet; Take 1 tablet (100 mg) by mouth At Bedtime  Dispense: 90 tablet; Refill: 3    7. Need for prophylactic vaccination and inoculation against influenza    - INFLUENZA (HIGH DOSE) 3 VALENT VACCINE [79737]  - ADMIN INFLUENZA (For MEDICARE Patients ONLY) []     BMI:   Estimated body mass index is 27.51 kg/m  as calculated from the following:    Height as of this encounter: 1.562 m (5' 1.5\").    Weight as of this encounter: 67.1 kg (148 lb).   Weight management plan: Discussed healthy diet and exercise guidelines    Return in about 1 year (around 10/25/2020) for Routine Visit.    Jacqueline Brandt MD  [facility]"

## 2019-10-25 ENCOUNTER — OFFICE VISIT (OUTPATIENT)
Dept: FAMILY MEDICINE | Facility: OTHER | Age: 84
End: 2019-10-25
Payer: MEDICARE

## 2019-10-25 VITALS
BODY MASS INDEX: 27.23 KG/M2 | DIASTOLIC BLOOD PRESSURE: 80 MMHG | HEIGHT: 62 IN | TEMPERATURE: 98.8 F | SYSTOLIC BLOOD PRESSURE: 136 MMHG | HEART RATE: 71 BPM | WEIGHT: 148 LBS | OXYGEN SATURATION: 97 % | RESPIRATION RATE: 14 BRPM

## 2019-10-25 DIAGNOSIS — F33.1 MODERATE RECURRENT MAJOR DEPRESSION (H): ICD-10-CM

## 2019-10-25 DIAGNOSIS — Z23 NEED FOR PROPHYLACTIC VACCINATION AND INOCULATION AGAINST INFLUENZA: ICD-10-CM

## 2019-10-25 DIAGNOSIS — I10 HYPERTENSION, GOAL BELOW 150/90: Primary | ICD-10-CM

## 2019-10-25 DIAGNOSIS — E78.5 HYPERLIPIDEMIA, UNSPECIFIED HYPERLIPIDEMIA TYPE: ICD-10-CM

## 2019-10-25 DIAGNOSIS — N18.30 CKD (CHRONIC KIDNEY DISEASE) STAGE 3, GFR 30-59 ML/MIN (H): ICD-10-CM

## 2019-10-25 DIAGNOSIS — G47.00 INSOMNIA, UNSPECIFIED TYPE: ICD-10-CM

## 2019-10-25 DIAGNOSIS — E03.9 HYPOTHYROIDISM, UNSPECIFIED TYPE: ICD-10-CM

## 2019-10-25 LAB
ALBUMIN SERPL-MCNC: 4 G/DL (ref 3.4–5)
ALP SERPL-CCNC: 69 U/L (ref 40–150)
ALT SERPL W P-5'-P-CCNC: 21 U/L (ref 0–50)
ANION GAP SERPL CALCULATED.3IONS-SCNC: 7 MMOL/L (ref 3–14)
AST SERPL W P-5'-P-CCNC: 13 U/L (ref 0–45)
BILIRUB SERPL-MCNC: 0.5 MG/DL (ref 0.2–1.3)
BUN SERPL-MCNC: 16 MG/DL (ref 7–30)
CALCIUM SERPL-MCNC: 8.8 MG/DL (ref 8.5–10.1)
CHLORIDE SERPL-SCNC: 107 MMOL/L (ref 94–109)
CHOLEST SERPL-MCNC: 172 MG/DL
CO2 SERPL-SCNC: 27 MMOL/L (ref 20–32)
CREAT SERPL-MCNC: 0.99 MG/DL (ref 0.52–1.04)
CREAT UR-MCNC: 106 MG/DL
GFR SERPL CREATININE-BSD FRML MDRD: 52 ML/MIN/{1.73_M2}
GLUCOSE SERPL-MCNC: 103 MG/DL (ref 70–99)
HDLC SERPL-MCNC: 65 MG/DL
LDLC SERPL CALC-MCNC: 75 MG/DL
MICROALBUMIN UR-MCNC: 37 MG/L
MICROALBUMIN/CREAT UR: 35.09 MG/G CR (ref 0–25)
NONHDLC SERPL-MCNC: 107 MG/DL
POTASSIUM SERPL-SCNC: 4 MMOL/L (ref 3.4–5.3)
PROT SERPL-MCNC: 7.3 G/DL (ref 6.8–8.8)
SODIUM SERPL-SCNC: 141 MMOL/L (ref 133–144)
TRIGL SERPL-MCNC: 161 MG/DL
TSH SERPL DL<=0.005 MIU/L-ACNC: 2.6 MU/L (ref 0.4–4)

## 2019-10-25 PROCEDURE — 36415 COLL VENOUS BLD VENIPUNCTURE: CPT | Performed by: FAMILY MEDICINE

## 2019-10-25 PROCEDURE — 90662 IIV NO PRSV INCREASED AG IM: CPT | Performed by: FAMILY MEDICINE

## 2019-10-25 PROCEDURE — G0008 ADMIN INFLUENZA VIRUS VAC: HCPCS | Performed by: FAMILY MEDICINE

## 2019-10-25 PROCEDURE — 99214 OFFICE O/P EST MOD 30 MIN: CPT | Mod: 25 | Performed by: FAMILY MEDICINE

## 2019-10-25 PROCEDURE — 84443 ASSAY THYROID STIM HORMONE: CPT | Performed by: FAMILY MEDICINE

## 2019-10-25 PROCEDURE — 82043 UR ALBUMIN QUANTITATIVE: CPT | Performed by: FAMILY MEDICINE

## 2019-10-25 PROCEDURE — 80061 LIPID PANEL: CPT | Performed by: FAMILY MEDICINE

## 2019-10-25 PROCEDURE — 80053 COMPREHEN METABOLIC PANEL: CPT | Performed by: FAMILY MEDICINE

## 2019-10-25 RX ORDER — VENLAFAXINE HYDROCHLORIDE 150 MG/1
CAPSULE, EXTENDED RELEASE ORAL
Qty: 90 CAPSULE | Refills: 3 | Status: SHIPPED | OUTPATIENT
Start: 2019-10-25 | End: 2020-12-03

## 2019-10-25 RX ORDER — BUPROPION HYDROCHLORIDE 300 MG/1
300 TABLET ORAL EVERY MORNING
Qty: 90 TABLET | Refills: 3 | Status: SHIPPED | OUTPATIENT
Start: 2019-10-25 | End: 2020-09-29 | Stop reason: ALTCHOICE

## 2019-10-25 RX ORDER — LEVOTHYROXINE SODIUM 88 UG/1
88 TABLET ORAL DAILY
Qty: 90 TABLET | Refills: 3 | Status: SHIPPED | OUTPATIENT
Start: 2019-10-25 | End: 2020-12-03

## 2019-10-25 RX ORDER — TRAZODONE HYDROCHLORIDE 100 MG/1
100 TABLET ORAL AT BEDTIME
Qty: 90 TABLET | Refills: 3 | Status: SHIPPED | OUTPATIENT
Start: 2019-10-25 | End: 2020-12-03

## 2019-10-25 RX ORDER — SIMVASTATIN 20 MG
TABLET ORAL
Qty: 90 TABLET | Refills: 3 | Status: SHIPPED | OUTPATIENT
Start: 2019-10-25 | End: 2020-12-03

## 2019-10-25 RX ORDER — METOPROLOL SUCCINATE 100 MG/1
100 TABLET, EXTENDED RELEASE ORAL DAILY
Qty: 90 TABLET | Refills: 3 | Status: SHIPPED | OUTPATIENT
Start: 2019-10-25 | End: 2020-11-25

## 2019-10-25 ASSESSMENT — PATIENT HEALTH QUESTIONNAIRE - PHQ9: SUM OF ALL RESPONSES TO PHQ QUESTIONS 1-9: 3

## 2019-10-25 ASSESSMENT — MIFFLIN-ST. JEOR: SCORE: 1071.63

## 2020-03-15 ENCOUNTER — HEALTH MAINTENANCE LETTER (OUTPATIENT)
Age: 85
End: 2020-03-15

## 2020-04-10 ENCOUNTER — APPOINTMENT (OUTPATIENT)
Dept: CT IMAGING | Facility: CLINIC | Age: 85
End: 2020-04-10
Attending: EMERGENCY MEDICINE
Payer: MEDICARE

## 2020-04-10 ENCOUNTER — HOSPITAL ENCOUNTER (EMERGENCY)
Facility: CLINIC | Age: 85
Discharge: HOME | End: 2020-04-10
Attending: EMERGENCY MEDICINE | Admitting: EMERGENCY MEDICINE
Payer: MEDICARE

## 2020-04-10 VITALS
TEMPERATURE: 98 F | OXYGEN SATURATION: 99 % | BODY MASS INDEX: 25.61 KG/M2 | HEIGHT: 64 IN | HEART RATE: 60 BPM | SYSTOLIC BLOOD PRESSURE: 140 MMHG | WEIGHT: 150 LBS | RESPIRATION RATE: 16 BRPM | DIASTOLIC BLOOD PRESSURE: 78 MMHG

## 2020-04-10 DIAGNOSIS — M54.2 NECK PAIN: ICD-10-CM

## 2020-04-10 DIAGNOSIS — S01.01XA LACERATION OF SCALP, INITIAL ENCOUNTER: ICD-10-CM
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
## 2020-04-10 LAB
ANION GAP SERPL CALCULATED.3IONS-SCNC: 6 MMOL/L (ref 3–14)
BASOPHILS # BLD AUTO: 0 10E9/L (ref 0–0.2)
BASOPHILS NFR BLD AUTO: 0.5 %
BUN SERPL-MCNC: 14 MG/DL (ref 7–30)
CALCIUM SERPL-MCNC: 8.7 MG/DL (ref 8.5–10.1)
CHLORIDE SERPL-SCNC: 107 MMOL/L (ref 94–109)
CO2 SERPL-SCNC: 26 MMOL/L (ref 20–32)
CREAT SERPL-MCNC: 0.84 MG/DL (ref 0.52–1.04)
DIFFERENTIAL METHOD BLD: NORMAL
EOSINOPHIL NFR BLD AUTO: 2.3 %
ERYTHROCYTE [DISTWIDTH] IN BLOOD BY AUTOMATED COUNT: 13.2 % (ref 10–15)
GFR SERPL CREATININE-BSD FRML MDRD: 64 ML/MIN/{1.73_M2}
GLUCOSE SERPL-MCNC: 96 MG/DL (ref 70–99)
HCT VFR BLD AUTO: 38 % (ref 35–47)
HGB BLD-MCNC: 12.6 G/DL (ref 11.7–15.7)
IMM GRANULOCYTES # BLD: 0 10E9/L (ref 0–0.4)
IMM GRANULOCYTES NFR BLD: 0.4 %
INR PPP: 1.07 (ref 0.86–1.14)
LYMPHOCYTES # BLD AUTO: 2 10E9/L (ref 0.8–5.3)
LYMPHOCYTES NFR BLD AUTO: 27.8 %
MCH RBC QN AUTO: 31.6 PG (ref 26.5–33)
MCHC RBC AUTO-ENTMCNC: 33.2 G/DL (ref 31.5–36.5)
MCV RBC AUTO: 95 FL (ref 78–100)
MONOCYTES # BLD AUTO: 0.7 10E9/L (ref 0–1.3)
MONOCYTES NFR BLD AUTO: 9.7 %
NEUTROPHILS # BLD AUTO: 4.4 10E9/L (ref 1.6–8.3)
NEUTROPHILS NFR BLD AUTO: 59.3 %
NRBC # BLD AUTO: 0 10*3/UL
NRBC BLD AUTO-RTO: 0 /100
PLATELET # BLD AUTO: 242 10E9/L (ref 150–450)
POTASSIUM SERPL-SCNC: 3.7 MMOL/L (ref 3.4–5.3)
RBC # BLD AUTO: 3.99 10E12/L (ref 3.8–5.2)
SODIUM SERPL-SCNC: 139 MMOL/L (ref 133–144)
WBC # BLD AUTO: 7.4 10E9/L (ref 4–11)

## 2020-04-10 PROCEDURE — 70450 CT HEAD/BRAIN W/O DYE: CPT

## 2020-04-10 PROCEDURE — 12001 RPR S/N/AX/GEN/TRNK 2.5CM/<: CPT | Mod: Z6 | Performed by: EMERGENCY MEDICINE

## 2020-04-10 PROCEDURE — 72125 CT NECK SPINE W/O DYE: CPT

## 2020-04-10 PROCEDURE — 85025 COMPLETE CBC W/AUTO DIFF WBC: CPT | Performed by: EMERGENCY MEDICINE

## 2020-04-10 PROCEDURE — 99285 EMERGENCY DEPT VISIT HI MDM: CPT | Mod: 25 | Performed by: EMERGENCY MEDICINE

## 2020-04-10 PROCEDURE — 80048 BASIC METABOLIC PNL TOTAL CA: CPT | Performed by: EMERGENCY MEDICINE

## 2020-04-10 PROCEDURE — 12001 RPR S/N/AX/GEN/TRNK 2.5CM/<: CPT | Performed by: EMERGENCY MEDICINE

## 2020-04-10 PROCEDURE — 85610 PROTHROMBIN TIME: CPT | Performed by: EMERGENCY MEDICINE

## 2020-04-10 PROCEDURE — 25000128 H RX IP 250 OP 636: Performed by: EMERGENCY MEDICINE

## 2020-04-10 PROCEDURE — 99284 EMERGENCY DEPT VISIT MOD MDM: CPT | Mod: 25 | Performed by: EMERGENCY MEDICINE

## 2020-04-10 PROCEDURE — 96374 THER/PROPH/DIAG INJ IV PUSH: CPT | Performed by: EMERGENCY MEDICINE

## 2020-04-10 RX ORDER — HYDROCODONE BITARTRATE AND ACETAMINOPHEN 5; 325 MG/1; MG/1
1 TABLET ORAL EVERY 6 HOURS PRN
Qty: 15 TABLET | Refills: 0 | Status: SHIPPED | OUTPATIENT
Start: 2020-04-10 | End: 2020-04-14

## 2020-04-10 RX ORDER — HYDROMORPHONE HYDROCHLORIDE 1 MG/ML
0.5 INJECTION, SOLUTION INTRAMUSCULAR; INTRAVENOUS; SUBCUTANEOUS
Status: DISCONTINUED | OUTPATIENT
Start: 2020-04-10 | End: 2020-04-10 | Stop reason: HOSPADM

## 2020-04-10 RX ORDER — GINSENG 100 MG
CAPSULE ORAL
Status: DISCONTINUED
Start: 2020-04-10 | End: 2020-04-10 | Stop reason: HOSPADM

## 2020-04-10 RX ADMIN — HYDROMORPHONE HYDROCHLORIDE 0.5 MG: 1 INJECTION, SOLUTION INTRAMUSCULAR; INTRAVENOUS; SUBCUTANEOUS at 17:55

## 2020-04-10 ASSESSMENT — MIFFLIN-ST. JEOR: SCORE: 1115.4

## 2020-04-10 NOTE — DISCHARGE INSTRUCTIONS
You can take ibuprofen or Vicodin for pain.  Ice to your head and neck.  You may have increased discomfort over the next day or so due to muscle spasm.

## 2020-04-10 NOTE — ED PROVIDER NOTES
History     Chief Complaint   Patient presents with     Fall     HPI  Erwin Tolliver is a 85 year old female who presents by ambulance after she fell while at home.  She was standing on a small step stool hanging curtains and unfortunately fell backwards hitting the back of her head.  She thinks she had a brief loss of consciousness.  Has a laceration of the back of the head and states that it bled quite a bit initially.  Has a moderate headache and neck pain currently on a 7-8/10 scale.  Denies any focal motor weakness in the extremities.  Able to ambulate thereafter.  She initially states she called to the kitchen but then was able ambulate to the ambulance and in the department.  Denies any chest pain.  She has no shortness of breath.  Denies any abdominal pain or nausea.  She denies any change in her sensation or paresthesias.  No treatment for pain prior to arrival.  She is not on blood thinners.  Tetanus was updated in 2013  Allergies:  Allergies   Allergen Reactions     Aspirin      headaches,tremors,nausea     Augmentin [Amoxicillin-Pot Clavulanate] Nausea and Vomiting       Problem List:    Patient Active Problem List    Diagnosis Date Noted     Other chronic sinusitis 03/01/2017     Priority: Medium     Penicillin allergy 03/01/2017     Priority: Medium     Hypertension, goal below 150/90 05/05/2015     Priority: Medium     Macular degeneration (senile) of retina 04/04/2014     Priority: Medium     Advanced directives, counseling/discussion 10/01/2013     Priority: Medium     Moderate recurrent major depression (H) 10/01/2013     Priority: Medium     CKD (chronic kidney disease) stage 3, GFR 30-59 ml/min (H) 08/15/2012     Priority: Medium     Atopic rhinitis 01/09/2012     Priority: Medium     GERD (gastroesophageal reflux disease) 08/02/2011     Priority: Medium     Osteopenia 11/15/2010     Priority: Medium     Hyperlipidemia, unspecified 10/31/2010     Priority: Medium     Hypothyroidism  2006     Priority: Medium     Migraine with aura      Priority: Medium        Past Medical History:    Past Medical History:   Diagnosis Date     Arthritis      Depressive disorder      Depressive disorder, not elsewhere classified      Hepatitis, unspecified      Hypertension      Migraine with aura, without mention of intractable migraine without mention of status migrainosus      Need for prophylactic hormone replacement therapy (postmenopausal)      Palpitations      SVT (supraventricular tachycardia) (H) 10/8/2012     Thyroid disease      Unspecified essential hypertension        Past Surgical History:    Past Surgical History:   Procedure Laterality Date     APPENDECTOMY       BACK SURGERY      bulged disc     C TOTAL ABDOM HYSTERECTOMY      Hysterectomy, Total Abdominal and BSO -benign - age 41     COLONOSCOPY       ENT SURGERY       EYE SURGERY       HC COLONOSCOPY W BIOPSY  06     HC KNEE SCOPE, DIAGNOSTIC      Arthroscopy, Knee     HC REDUCTION OF LARGE BREAST       ORTHOPEDIC SURGERY       SEPTOPLASTY N/A 2017    Procedure: SEPTOPLASTY;  Septoplasty, Submucosal resection of the turbinates;  Surgeon: Edwin Cox MD;  Location: [facility]     SHOULDER SURGERY  2019       Family History:    Family History   Problem Relation Age of Onset     Alzheimer Disease Mother      Arthritis Sister          at 72 years     Lipids Sister      Osteoporosis Sister         osteoporosis     Genetic Disorder Sister         down syndrome     Alzheimer Disease Sister      Thyroid Disease Sister      Cardiovascular Brother          at 72 years of CHF       Social History:  Marital Status:   [2]  Social History     Tobacco Use     Smoking status: Former Smoker     Packs/day: 0.50     Years: 10.00     Pack years: 5.00     Last attempt to quit: 1966     Years since quittin.3     Smokeless tobacco: Never Used     Tobacco comment: no smokers in household   Substance Use  "Topics     Alcohol use: No     Alcohol/week: 1.7 standard drinks     Comment: beerx2/ x8per month     Drug use: No        Medications:    HYDROcodone-acetaminophen (NORCO) 5-325 MG tablet  ACETAMINOPHEN PO  buPROPion (WELLBUTRIN XL) 300 MG 24 hr tablet  ipratropium (ATROVENT) 0.03 % spray  levothyroxine (SYNTHROID/LEVOTHROID) 88 MCG tablet  metoprolol succinate ER (TOPROL-XL) 100 MG 24 hr tablet  Multiple Vitamins-Minerals (PRESERVISION AREDS PO)  nitroGLYcerin (NITROSTAT) 0.4 MG sublingual tablet  omeprazole (PRILOSEC) 20 MG DR capsule  Pseudoephedrine-Ibuprofen (ADVIL COLD/SINUS PO)  simvastatin (ZOCOR) 20 MG tablet  SUMAtriptan (IMITREX) 25 MG tablet  traZODone (DESYREL) 100 MG tablet  venlafaxine (EFFEXOR-XR) 150 MG 24 hr capsule          Review of Systems all other systems are reviewed and are negative    Physical Exam   BP: (!) 167/95  Pulse: 79  Temp: 98  F (36.7  C)  Resp: 18  Height: 162.6 cm (5' 4\")  Weight: 68 kg (150 lb)  SpO2: 94 %      Physical Exam alert cooperative female in moderate distress.  HEENT reveals a gaping 3 cm scalp laceration on the occiput.  No active bleeding.  Surrounding bruising but no crepitus or step-off.  No hemotympanum or schwab signs.  Her pupils are equal reactive light accommodation.  Extraocular motions are intact.  She is hard of hearing and does not have her hearing aids in currently.  Her speech is clear and concise.  No dental trauma or malocclusion.  No epistasis.  No raccoons eyes.  Neck reveals diffuse muscular tenderness.  No midline crepitus or step-off.  The lower back is nontender.  His extremities are atraumatic.  She has intact strength for gross and fine motor skills.  Sensory exam is intact.    ED Course        Procedures        Patient had a field block of 1% lidocaine with epinephrine.  Total of 4 cc was used.  Wound was irrigated with normal saline.  The laceration was then closed with 6 staples.  No active bleeding thereafter.  Antibiotic was applied.   "     Critical Care time:  none               Results for orders placed or performed during the hospital encounter of 04/10/20 (from the past 24 hour(s))   CBC with platelets differential   Result Value Ref Range    WBC 7.4 4.0 - 11.0 10e9/L    RBC Count 3.99 3.8 - 5.2 10e12/L    Hemoglobin 12.6 11.7 - 15.7 g/dL    Hematocrit 38.0 35.0 - 47.0 %    MCV 95 78 - 100 fl    MCH 31.6 26.5 - 33.0 pg    MCHC 33.2 31.5 - 36.5 g/dL    RDW 13.2 10.0 - 15.0 %    Platelet Count 242 150 - 450 10e9/L    Diff Method Automated Method     % Neutrophils 59.3 %    % Lymphocytes 27.8 %    % Monocytes 9.7 %    % Eosinophils 2.3 %    % Basophils 0.5 %    % Immature Granulocytes 0.4 %    Nucleated RBCs 0 0 /100    Absolute Neutrophil 4.4 1.6 - 8.3 10e9/L    Absolute Lymphocytes 2.0 0.8 - 5.3 10e9/L    Absolute Monocytes 0.7 0.0 - 1.3 10e9/L    Absolute Basophils 0.0 0.0 - 0.2 10e9/L    Abs Immature Granulocytes 0.0 0 - 0.4 10e9/L    Absolute Nucleated RBC 0.0    INR   Result Value Ref Range    INR 1.07 0.86 - 1.14   Basic metabolic panel   Result Value Ref Range    Sodium 139 133 - 144 mmol/L    Potassium 3.7 3.4 - 5.3 mmol/L    Chloride 107 94 - 109 mmol/L    Carbon Dioxide 26 20 - 32 mmol/L    Anion Gap 6 3 - 14 mmol/L    Glucose 96 70 - 99 mg/dL    Urea Nitrogen 14 7 - 30 mg/dL    Creatinine 0.84 0.52 - 1.04 mg/dL    GFR Estimate 64 >60 mL/min/[1.73_m2]    GFR Estimate If Black 74 >60 mL/min/[1.73_m2]    Calcium 8.7 8.5 - 10.1 mg/dL   CT Head w/o Contrast    Narrative    CT SCAN OF THE HEAD WITHOUT CONTRAST   4/10/2020 6:21 PM     HISTORY: Closed head injury with scalp laceration.    TECHNIQUE: Axial images of the head and coronal reformations without  IV contrast material. Radiation dose for this scan was reduced using  automated exposure control, adjustment of the mA and/or kV according  to patient size, or iterative reconstruction technique.    COMPARISON: None.    FINDINGS: There is no evidence of intracranial hemorrhage, mass,  acute  infarct or anomaly. There is moderate generalized atrophy of the  brain. There is moderate scattered patchy and confluent low  attenuation in the white matter of the cerebral hemispheres consistent  with sequelae of small vessel ischemic disease. Ventricular size is  within normal limits without evidence of hydrocephalus.     Small left parietal scalp hematoma without underlying displaced  calvarial fracture. The paranasal sinuses and mastoids are free of  significant disease. The middle ear cavities are clear. Findings of  previous bilateral cataract surgery without acute orbital abnormality.  Advanced left temporomandibular joint degenerative changes. The bony  calvarium and bones of the skull base appear intact.       Impression    IMPRESSION:     1. No evidence of acute intracranial hemorrhage, mass, or herniation.  2. Moderate generalized brain atrophy and presumed chronic small  vessel ischemic disease.  3. Right parietal scalp contusion without underlying displaced  calvarial fracture.     CT Cervical Spine w/o Contrast    Narrative    CT CERVICAL SPINE WITHOUT CONTRAST   4/10/2020 6:25 PM     HISTORY: Fall, neck pain.     TECHNIQUE: Axial images of the cervical spine were obtained without  intravenous contrast. Multiplanar reformations were performed.   Radiation dose for this scan was reduced using automated exposure  control, adjustment of the mA and/or kV according to patient size, or  iterative reconstruction technique.    COMPARISON: MRI cervical spine 1/4/2013.    FINDINGS: No acute fracture is seen. Alignment is unchanged, with mild  anterolisthesis of C4 on C5, and mild retrolisthesis of C5 on C6 on a  degenerative basis with slight reversal of the normal cervical  lordosis. Moderate degenerative disc height loss at C5-C6 with milder  degrees of disc height loss elsewhere, as before. Multilevel  uncovertebral arthropathy. There is osseous fusion across the left  C2-C3 facet joint and also  across the left C4-C5 facet joint. There is  also solid osseous fusion across the right C4-C5 facet joint.  Multilevel facet arthropathy is seen elsewhere. Mild spinal stenosis  at C5-C6 related to a diffuse disc osteophyte complex with  uncovertebral and facet arthropathy at that level effacing the ventral  thecal sac and minimally indenting the ventral aspect of the cord.  Otherwise, no significant spinal stenosis. Varying degrees of neural  foraminal narrowing are present, most pronounced on the left at C3-C4,  C4-C5 and C5-C6 and on the right at C4-C5 and to a lesser degree at  C5-C6. Bilateral carotid bifurcation atherosclerotic calcifications  are noted. Multiple thyroid nodules are seen, one with peripheral  coarse calcifications measuring up to 10 mm in the axial plane. Mild  presumed nodular scarring in the lung apices.      Impression    IMPRESSION:  1. No acute fracture or traumatic malalignment of the cervical spine.   2. Multilevel degenerative disc disease and facet arthropathy, as  described.  3. Other incidental findings, as detailed.       Medications   HYDROmorphone (PF) (DILAUDID) injection 0.5 mg (0.5 mg Intravenous Given 4/10/20 1755)   bacitracin 500 UNIT/GM ointment (has no administration in time range)     Is established and blood work is ordered.  Patient was given Dilaudid for pain.  CT of her head and neck ordered.  Assessments & Plan (with Medical Decision Making)   Erwin Tolliver is a 85 year old female who presents by ambulance after she fell while at home.  She was standing on a small step stool hanging curtains and unfortunately fell backwards hitting the back of her head.  She thinks she had a brief loss of consciousness.  Has a laceration of the back of the head and states that it bled quite a bit initially.  Has a moderate headache and neck pain currently on a 7-8/10 scale.  Denies any focal motor weakness in the extremities.  Able to ambulate thereafter.  She initially states  she called to the kitchen but then was able ambulate to the ambulance and in the department.  Denies any chest pain.  She has no shortness of breath.  Denies any abdominal pain or nausea.  She denies any change in her sensation or paresthesias.  No treatment for pain prior to arrival.  She is not on blood thinners.  Tetanus was updated in 2013.  On presentation patient was vitally stable.  Mildly hypertensive.  She has a scalp plaque which is described in.  As above.  No hemotympanum or schwba signs.  No facial droop or asymmetry.  No midline bony tenderness on cervical exam.  Neurologically nonfocal.  Improved with Dilaudid for pain standpoint.  CT imaging of her head and neck showed no acute fractures.  Information on closed head injury and laceration care is provided.  Follow-up with her primary doctor in 7 to 10 days for staple removal.  She can take ibuprofen or Vicodin for pain.   I have reviewed the nursing notes.    I have reviewed the findings, diagnosis, plan and need for follow up with the patient.       New Prescriptions    HYDROCODONE-ACETAMINOPHEN (NORCO) 5-325 MG TABLET    Take 1 tablet by mouth every 6 hours as needed for severe pain       Final diagnoses:   Fall, initial encounter   Laceration of scalp, initial encounter   Neck pain       4/10/2020   [facility] EMERGENCY DEPARTMENT     J Luis Franklin MD  04/10/20 [id number]

## 2020-04-10 NOTE — ED AVS SNAPSHOT
[facility] Emergency Department  [address] [place] [address]  Phone:  [phone]  Fax:  [phone]                                    Erwin Tolliver   MRN: [id number]    Department:  [facility] Emergency Department   Date of Visit:  4/10/2020           After Visit Summary Signature Page    I have received my discharge instructions, and my questions have been answered. I have discussed any challenges I see with this plan with the nurse or doctor.    ..........................................................................................................................................  Patient/Patient Representative Signature      ..........................................................................................................................................  Patient Representative Print Name and Relationship to Patient    ..................................................               ................................................  Date                                   Time    ..........................................................................................................................................  Reviewed by Signature/Title    ...................................................              ..............................................  Date                                               Time          22EPIC Rev 08/18

## 2020-04-10 NOTE — ED TRIAGE NOTES
Was standing on a step stool hanging some curtains and fell backwards striking the back of her head.  Laceration present and states she may have blacked out a bit, complains of neck pain.  Trauma eval called at triage and Dr. Franklin in room.

## 2020-04-28 ENCOUNTER — ALLIED HEALTH/NURSE VISIT (OUTPATIENT)
Dept: FAMILY MEDICINE | Facility: OTHER | Age: 85
End: 2020-04-28
Payer: MEDICARE

## 2020-04-28 VITALS — SYSTOLIC BLOOD PRESSURE: 137 MMHG | DIASTOLIC BLOOD PRESSURE: 81 MMHG | HEART RATE: 88 BPM

## 2020-04-28 DIAGNOSIS — Z48.02 ENCOUNTER FOR STAPLE REMOVAL: Primary | ICD-10-CM

## 2020-04-28 PROCEDURE — 99207 ZZC NO CHARGE NURSE ONLY: CPT

## 2020-04-28 NOTE — PROGRESS NOTES
Erwin Tolliver presents to the clinic today for suture/staple removal.    The patient has had the sutures/staples placed on 04/10/2020  There has been no history of infection or drainage.  Tetanus status is up to date.     OBJECTIVE:   6 sutures/staples are seen located on the back of head.    The wound is healing well with no signs of infection.      ASSESSMENT:   Staple Removal.  Huddled with NP for VORB to remove as these were placed 18 days ago    PLAN:    All sutures were easily removed today. Routine wound care discussed.  The patient will follow up as needed.    David Davis, RN, BSN

## 2020-09-14 ENCOUNTER — VIRTUAL VISIT (OUTPATIENT)
Dept: FAMILY MEDICINE | Facility: OTHER | Age: 85
End: 2020-09-14

## 2020-09-14 DIAGNOSIS — Z20.822 SUSPECTED COVID-19 VIRUS INFECTION: Primary | ICD-10-CM

## 2020-09-14 DIAGNOSIS — Z20.822 SUSPECTED COVID-19 VIRUS INFECTION: ICD-10-CM

## 2020-09-14 PROCEDURE — U0003 INFECTIOUS AGENT DETECTION BY NUCLEIC ACID (DNA OR RNA); SEVERE ACUTE RESPIRATORY SYNDROME CORONAVIRUS 2 (SARS-COV-2) (CORONAVIRUS DISEASE [COVID-19]), AMPLIFIED PROBE TECHNIQUE, MAKING USE OF HIGH THROUGHPUT TECHNOLOGIES AS DESCRIBED BY CMS-2020-01-R: HCPCS | Performed by: FAMILY MEDICINE

## 2020-09-14 NOTE — PROGRESS NOTES
"Date: 2020 11:45:35  Clinician: Mita Monte  Clinician NPI: [id number]  Patient: Erwin Tolliver  Patient : 1935  Patient Address: [address], [place] [address]  Patient Phone: [phone]  Visit Protocol: URI  Patient Summary:  Erwin is a 85 year old ( : 1935 ) female who initiated a [facility] Visit for COVID-19 (Coronavirus) evaluation and screening. When asked the question \"Please sign me up to receive news, health information and promotions. \", Erwin responded \"No\".    Erwin states her symptoms started 1-2 days ago.   Her symptoms consist of ear pain, facial pain or pressure, vomiting, rhinitis, wheezing, tooth pain, a cough, nasal congestion, a headache, and malaise.   Symptom details     Nasal secretions: The color of her mucus is yellow.    Cough: Erwin coughs a few times an hour and her cough is not more bothersome at night. Phlegm does not come into her throat when she coughs. She does not believe her cough is caused by post-nasal drip.     Wheezing: Erwin has not ever been diagnosed with asthma. Additional wheezing details as reported by the patient (free text): Little to no effect       Facial pain or pressure: The facial pain or pressure does not feel worse when bending or leaning forward.     Headache: She states the headache is mild (1-3 on a 10 point pain scale).     Tooth pain: The tooth pain is not caused by a cavity, recent dental work, or other mouth problems.      Erwin denies having anosmia, fever, nausea, sore throat, ageusia, diarrhea, chills, and myalgias. She also denies taking antibiotic medication in the past month, having recent facial or sinus surgery in the past 60 days, and having a sinus infection within the past year. She is not experiencing dyspnea.   Precipitating events  She has not recently been exposed to someone with influenza. Erwin has not been in close contact with any high risk individuals.   Pertinent COVID-19 (Coronavirus) information  In the " past 14 days, Erwin has not worked in a congregate living setting.   She does not work or volunteer as healthcare worker or a  and does not work or volunteer in a healthcare facility.   Erwin also has not lived in a congregate living setting in the past 14 days. She does not live with a healthcare worker.   Erwin has not had a close contact with a laboratory-confirmed COVID-19 patient within 14 days of symptom onset.   Since December 2019, Erwin and has not had upper respiratory infection or influenza-like illness. Has not been diagnosed with lab-confirmed COVID-19 test   Pertinent medical history  Erwin does not get yeast infections when she takes antibiotics.   Erwin does not need a return to work/school note.   Weight: 150 lbs   Erwin does not smoke or use smokeless tobacco.   Weight: 150 lbs    MEDICATIONS: trazodone oral, metoprolol succinate oral, bupropion HCl oral, simvastatin oral, levothyroxine oral, venlafaxine oral, ALLERGIES: Amaris Aspirin, Augmentin  Clinician Response:  Dear Erwin,   Your symptoms show that you may have coronavirus (COVID-19). This illness can cause fever, cough and trouble breathing. Many people get a mild case and get better on their own. Some people can get very sick.  What should I do?  We would like to test you for this virus.   1. Please call [phone] to schedule your visit. Explain that you were referred by [facility] to have a COVID-19 test. Be ready to share your [facility] visit ID number.  The following will serve as your written order for this COVID Test, ordered by me, for the indication of suspected COVID [Z20.828]: The test will be ordered in [employer], our electronic health record, after you are scheduled. It will show as ordered and authorized by Yobany Joiner MD.  Order: COVID-19 (Coronavirus) PCR for SYMPTOMATIC testing from [facility].      2. When it's time for your COVID test:  Stay at least 6 feet away from others. (If someone will drive you to your  "test, stay in the backseat, as far away from the  as you can.)   Cover your mouth and nose with a mask, tissue or washcloth.  Go straight to the testing site. Don't make any stops on the way there or back.      3.Starting now: Stay home and away from others (self-isolate) until:   You've had no fever---and no medicine that reduces fever---for one full day (24 hours). And...   Your other symptoms have gotten better. For example, your cough or breathing has improved. And...   At least 10 days have passed since your symptoms started.       During this time, don't leave the house except for testing or medical care.   Stay in your own room, even for meals. Use your own bathroom if you can.   Stay away from others in your home. No hugging, kissing or shaking hands. No visitors.  Don't go to work, school or anywhere else.    Clean \"high touch\" surfaces often (doorknobs, counters, handles, etc.). Use a household cleaning spray or wipes. You'll find a full list of  on the EPA website: www.epa.gov/pesticide-registration/list-n-disinfectants-use-against-sars-cov-2.   Cover your mouth and nose with a mask, tissue or washcloth to avoid spreading germs.  Wash your hands and face often. Use soap and water.  Caregivers in these groups are at risk for severe illness due to COVID-19:  o People 65 years and older  o People who live in a nursing home or long-term care facility  o People with chronic disease (lung, heart, cancer, diabetes, kidney, liver, immunologic)  o People who have a weakened immune system, including those who:   Are in cancer treatment  Take medicine that weakens the immune system, such as corticosteroids  Had a bone marrow or organ transplant  Have an immune deficiency  Have poorly controlled HIV or AIDS  Are obese (body mass index of 40 or higher)  Smoke regularly   o Caregivers should wear gloves while washing dishes, handling laundry and cleaning bedrooms and bathrooms.  o Use caution when washing " and drying laundry: Don't shake dirty laundry, and use the warmest water setting that you can.  o For more tips, go to www.cdc.gov/coronavirus/2019-ncov/downloads/10Things.pdf.    4.Sign up for Cheryl Flores. We know it's scary to hear that you might have COVID-19. We want to track your symptoms to make sure you're okay over the next 2 weeks. Please look for an email from Cheryl Flores---this is a free, online program that we'll use to keep in touch. To sign up, follow the link in the email. Learn more at [email or web address]  How can I take care of myself?   Get lots of rest. Drink extra fluids (unless a doctor has told you not to).   Take Tylenol (acetaminophen) for fever or pain. If you have liver or kidney problems, ask your family doctor if it's okay to take Tylenol.   Adults can take either:    650 mg (two 325 mg pills) every 4 to 6 hours, or...   1,000 mg (two 500 mg pills) every 8 hours as needed.    Note: Don't take more than 3,000 mg in one day. Acetaminophen is found in many medicines (both prescribed and over-the-counter medicines). Read all labels to be sure you don't take too much.   For children, check the Tylenol bottle for the right dose. The dose is based on the child's age or weight.    If you have other health problems (like cancer, heart failure, an organ transplant or severe kidney disease): Call your specialty clinic if you don't feel better in the next 2 days.       Know when to call 911. Emergency warning signs include:    Trouble breathing or shortness of breath Pain or pressure in the chest that doesn't go away Feeling confused like you haven't felt before, or not being able to wake up Bluish-colored lips or face.  Where can I get more information?    [employer] [facility] -- About COVID-19: [email or web address]   CDC -- What to Do If You're Sick: www.cdc.gov/coronavirus/2019-ncov/about/steps-when-sick.html   CDC -- Ending Home Isolation:  www.cdc.gov/coronavirus/2019-ncov/hcp/disposition-in-home-patients.html   [facility] -- Caring for Someone: www.cdc.gov/coronavirus/2019-ncov/if-you-are-sick/care-for-someone.html   [facility] -- Interim Guidance for Hospital Discharge to Home: www.[facility].mn.us/diseases/coronavirus/hcp/hospdischarge.pdf   [facility] clinical trials (COVID-19 research studies): [email or web address].[facility]-clinical-trials    Below are the COVID-19 hotlines at the [place] Department of Health ([facility]). Interpreters are available.    For health questions: Call 983-427-8580 or 1-199.549.5615 (7 a.m. to 7 p.m.) For questions about schools and childcare: Call 583-920-0725 or [phone] (7 a.m. to 7 p.m.)    Diagnosis: Cough  Diagnosis ICD: R05

## 2020-09-15 LAB
SARS-COV-2 RNA SPEC QL NAA+PROBE: NOT DETECTED
SPECIMEN SOURCE: NORMAL

## 2020-09-17 NOTE — PROGRESS NOTES
Subjective     Erwin Tolliver is a 85 year old female who presents to clinic today for the following health issues:    Does patient want to do medicare annual wellness physical today? YES If yes add questions in superlist.        HPI     Back Pain  Onset/Duration: Ongoing for years, worsening, some days just lies on the couch as it hurts to move.  Tylenol gives relief, but doesn't like taking medication so hardly uses it.  Description:   Location of pain: low back mostly left, sometimes right side  Character of pain: sharp and shooting  Pain radiation: radiates into the left buttocks and radiates into the left leg  New numbness or weakness in legs, not attributed to pain: no   Intensity: Currently 7-8/10  Progression of Symptoms: worsening and same  History:   Specific cause: none  Pain interferes with job: not applicable  History of back problems: Ongoing  Any previous MRI or X-rays: Yes--at Orthopedic office in [place].  Date 2018  Sees a specialist for back pain: No  Alleviating factors:   Improved by: Injection    Precipitating factors:  Worsened by: Standing, Walking  Therapies tried and outcome: steroid injection    Accompanying Signs & Symptoms:  Risk of Fracture: Age >64  Risk of Cauda Equina: None  Risk of Infection: None  Risk of Cancer: None  Risk of Ankylosing Spondylitis: Onset at age <35, male, AND morning back stiffness  no       Annual Wellness Visit    Patient has been advised of split billing requirements and indicates understanding: Yes     Are you in the first 12 months of your Medicare Part B coverage?  No    Physical Health:    In general, how would you rate your overall physical health? good    Outside of work, how many days during the week do you exercise?none    Outside of work, approximately how many minutes a day do you exercise?not applicable    If you drink alcohol do you typically have >3 drinks per day or >7 drinks per week? No    Do you usually eat at least 4 servings of fruit  "and vegetables a day, include whole grains & fiber and avoid regularly eating high fat or \"junk\" foods? Yes    Do you have any problems taking medications regularly? No    Do you have any side effects from medications? not applicable    Needs assistance for the following daily activities: no assistance needed    Which of the following safety concerns are present in your home?  none identified     Hearing impairment: No    In the past 6 months, have you been bothered by leaking of urine? no    Mental Health:    In general, how would you rate your overall mental or emotional health? good  PHQ-2 Score:      Do you feel safe in your environment? Yes    Have you ever done Advance Care Planning? (For example, a Health Directive, POLST, or a discussion with a medical provider or your loved ones about your wishes)? No, advance care planning information given to patient to review.  Patient plans to discuss their wishes with loved ones or provider.      Fall risk:  Fallen 2 or more times in the past year?: Yes  Any fall with injury in the past year?: Yes  Timed Up and Go Test (>13.5 is fall risk; contact physician) : 1    Cognitive Screenin) Repeat 3 items (Leader, Season, Table)    2) Clock draw: NORMAL  3) 3 item recall: Recalls 3 objects  Results: 3 items recalled: COGNITIVE IMPAIRMENT LESS LIKELY    Mini-CogTM Copyright S Noah. Licensed by the author for use in [facility]; reprinted with permission ([email or web address]). All rights reserved.      Do you have sleep apnea, excessive snoring or daytime drowsiness?: no    Current providers sharing in care for this patient include:   Patient Care Team:  Jacqueline Brandt MD as PCP - General  Jacqueline Brandt MD as Assigned PCP    Patient has been advised of split billing requirements and indicates understanding: Yes    Review of Systems   CONSTITUTIONAL: NEGATIVE for fever, chills, change in weight  ENT/MOUTH: NEGATIVE for ear, mouth and throat " "problems  RESP: NEGATIVE for significant cough or shortness of breath   CV: NEGATIVE for chest pain, palpitations or peripheral edema  Psych: She admits to feeling down, does not like leaving the house, her kids have been asking her what is wrong, her   5 months ago.  She tried calling a grief support group but they never returned her call.  She feels that the bupropion is expensive.      Objective    /84   Pulse 73   Temp 98.7  F (37.1  C) (Temporal)   Resp 16   Ht 1.56 m (5' 1.42\")   Wt 64.9 kg (143 lb)   SpO2 96%   BMI 26.65 kg/m    Body mass index is 26.65 kg/m .  Physical Exam   Gen: no apparent distress  NECK: no adenopathy, no asymmetry, no masses  Chest: clear to auscultation without wheeze, rale or rhonchi  Cor: regular rate and rhythm without murmur  ABDOMEN: soft, nontender, no masses and bowel sounds normal  Ext: warm and dry without edema  Psych: Alert and oriented times 3; coherent speech, normal   rate and volume, able to articulate logical thoughts, able   to abstract reason, no tangential thoughts, no hallucinations   or delusions  Her affect is neutral     Comprehensive back pain exam:  Tenderness of Left sacroiliac joint, Range of motion not limited by pain, Lower extremity strength functional and equal on both sides, Lower extremity reflexes within normal limits bilaterally, Lower extremity sensation normal and equal on both sides and Straight leg raise negative bilaterally      Lumbar x-ray: Reviewed by me--degenerative changes seen, scolioisis, retrolisthesis, radiology review pending        Assessment & Plan     Encounter for Medicare annual wellness exam      Chronic left-sided low back pain without sciatica  Patient with extensive degenerative disease, scoliosis and retrolisthesis.  Will refer to back specialist.  Offered physical therapy but patient declined.    - XR Lumbar Spine 2/3 Views; Future  - [facility]  Referral; Future    Hypothyroidism, " "unspecified type  Labs drawn.    - **TSH with free T4 reflex FUTURE anytime    Hyperlipidemia, unspecified hyperlipidemia type  Remains on statin.  Labs drawn.    - Lipid panel reflex to direct LDL Fasting  - **Comprehensive metabolic panel FUTURE anytime    Hypertension, goal below 150/90  Well-controlled.  Labs drawn.    - **Comprehensive metabolic panel FUTURE anytime  - Albumin Random Urine Quantitative with Creat Ratio    CKD (chronic kidney disease) stage 3, GFR 30-59 ml/min (H)  Avoid NSAIDs.  Okay to continue with Tylenol.    Moderate recurrent major depression (H)  Patient does not feel her medication combination is working.  She is also experiencing grief.  She is reached out to grief support group that has not been helpful.  She would like psychiatry evaluation for medication adjustment.  - MENTAL HEALTH REFERRAL  - Adult; Psychiatry; Psychiatry; [facility]: Collaborative Care Psychiatry Service/Bridge to Long-Term Psychiatry as indicated ([phone]); Yes; Chronic Mental Health without improvement; Yes; We will contact you to schedule ...    Need for prophylactic vaccination and inoculation against influenza    - FLUZONE HIGH DOSE 65+  [53087]  - Vaccine Administration, Initial [54688]     BMI:   Estimated body mass index is 26.65 kg/m  as calculated from the following:    Height as of this encounter: 1.56 m (5' 1.42\").    Weight as of this encounter: 64.9 kg (143 lb).     Return in about 53 weeks (around 9/28/2021) for Annual Wellness Visit.    Jacqueline Brandt MD  [facility]      She is at risk for falling and has been provided with information to reduce the risk of falling at home.  "

## 2020-09-22 ENCOUNTER — ANCILLARY PROCEDURE (OUTPATIENT)
Dept: GENERAL RADIOLOGY | Facility: OTHER | Age: 85
End: 2020-09-22
Attending: FAMILY MEDICINE
Payer: MEDICARE

## 2020-09-22 ENCOUNTER — OFFICE VISIT (OUTPATIENT)
Dept: FAMILY MEDICINE | Facility: OTHER | Age: 85
End: 2020-09-22
Payer: MEDICARE

## 2020-09-22 VITALS
RESPIRATION RATE: 16 BRPM | SYSTOLIC BLOOD PRESSURE: 130 MMHG | BODY MASS INDEX: 27 KG/M2 | OXYGEN SATURATION: 96 % | TEMPERATURE: 98.7 F | HEART RATE: 73 BPM | WEIGHT: 143 LBS | HEIGHT: 61 IN | DIASTOLIC BLOOD PRESSURE: 84 MMHG

## 2020-09-22 DIAGNOSIS — G89.29 CHRONIC LEFT-SIDED LOW BACK PAIN WITHOUT SCIATICA: ICD-10-CM

## 2020-09-22 DIAGNOSIS — Z00.00 ENCOUNTER FOR MEDICARE ANNUAL WELLNESS EXAM: Primary | ICD-10-CM

## 2020-09-22 DIAGNOSIS — I10 HYPERTENSION, GOAL BELOW 150/90: ICD-10-CM

## 2020-09-22 DIAGNOSIS — M54.50 CHRONIC LEFT-SIDED LOW BACK PAIN WITHOUT SCIATICA: ICD-10-CM

## 2020-09-22 DIAGNOSIS — Z23 NEED FOR PROPHYLACTIC VACCINATION AND INOCULATION AGAINST INFLUENZA: ICD-10-CM

## 2020-09-22 DIAGNOSIS — F33.1 MODERATE RECURRENT MAJOR DEPRESSION (H): ICD-10-CM

## 2020-09-22 DIAGNOSIS — N18.30 CKD (CHRONIC KIDNEY DISEASE) STAGE 3, GFR 30-59 ML/MIN (H): ICD-10-CM

## 2020-09-22 DIAGNOSIS — E03.9 HYPOTHYROIDISM, UNSPECIFIED TYPE: ICD-10-CM

## 2020-09-22 DIAGNOSIS — E78.5 HYPERLIPIDEMIA, UNSPECIFIED HYPERLIPIDEMIA TYPE: ICD-10-CM

## 2020-09-22 LAB
ALBUMIN SERPL-MCNC: 3.9 G/DL (ref 3.4–5)
ALP SERPL-CCNC: 69 U/L (ref 40–150)
ALT SERPL W P-5'-P-CCNC: 27 U/L (ref 0–50)
ANION GAP SERPL CALCULATED.3IONS-SCNC: 3 MMOL/L (ref 3–14)
AST SERPL W P-5'-P-CCNC: 19 U/L (ref 0–45)
BILIRUB SERPL-MCNC: 0.7 MG/DL (ref 0.2–1.3)
BUN SERPL-MCNC: 15 MG/DL (ref 7–30)
CALCIUM SERPL-MCNC: 8.8 MG/DL (ref 8.5–10.1)
CHLORIDE SERPL-SCNC: 105 MMOL/L (ref 94–109)
CHOLEST SERPL-MCNC: 173 MG/DL
CO2 SERPL-SCNC: 30 MMOL/L (ref 20–32)
CREAT SERPL-MCNC: 0.84 MG/DL (ref 0.52–1.04)
CREAT UR-MCNC: 48 MG/DL
GFR SERPL CREATININE-BSD FRML MDRD: 63 ML/MIN/{1.73_M2}
GLUCOSE SERPL-MCNC: 92 MG/DL (ref 70–99)
HDLC SERPL-MCNC: 58 MG/DL
LDLC SERPL CALC-MCNC: 83 MG/DL
MICROALBUMIN UR-MCNC: 20 MG/L
MICROALBUMIN/CREAT UR: 41.08 MG/G CR (ref 0–25)
NONHDLC SERPL-MCNC: 115 MG/DL
POTASSIUM SERPL-SCNC: 4 MMOL/L (ref 3.4–5.3)
PROT SERPL-MCNC: 7.5 G/DL (ref 6.8–8.8)
SODIUM SERPL-SCNC: 138 MMOL/L (ref 133–144)
TRIGL SERPL-MCNC: 159 MG/DL
TSH SERPL DL<=0.005 MIU/L-ACNC: 3.38 MU/L (ref 0.4–4)

## 2020-09-22 PROCEDURE — 80053 COMPREHEN METABOLIC PANEL: CPT | Performed by: FAMILY MEDICINE

## 2020-09-22 PROCEDURE — G0008 ADMIN INFLUENZA VIRUS VAC: HCPCS | Performed by: FAMILY MEDICINE

## 2020-09-22 PROCEDURE — 99214 OFFICE O/P EST MOD 30 MIN: CPT | Mod: 25 | Performed by: FAMILY MEDICINE

## 2020-09-22 PROCEDURE — G0439 PPPS, SUBSEQ VISIT: HCPCS | Performed by: FAMILY MEDICINE

## 2020-09-22 PROCEDURE — 84443 ASSAY THYROID STIM HORMONE: CPT | Performed by: FAMILY MEDICINE

## 2020-09-22 PROCEDURE — 72100 X-RAY EXAM L-S SPINE 2/3 VWS: CPT

## 2020-09-22 PROCEDURE — 36415 COLL VENOUS BLD VENIPUNCTURE: CPT | Performed by: FAMILY MEDICINE

## 2020-09-22 PROCEDURE — 90662 IIV NO PRSV INCREASED AG IM: CPT | Performed by: FAMILY MEDICINE

## 2020-09-22 PROCEDURE — 82043 UR ALBUMIN QUANTITATIVE: CPT | Performed by: FAMILY MEDICINE

## 2020-09-22 PROCEDURE — 80061 LIPID PANEL: CPT | Performed by: FAMILY MEDICINE

## 2020-09-22 ASSESSMENT — PAIN SCALES - GENERAL: PAINLEVEL: SEVERE PAIN (7)

## 2020-09-22 ASSESSMENT — MIFFLIN-ST. JEOR: SCORE: 1042.63

## 2020-09-22 NOTE — PATIENT INSTRUCTIONS
Patient Education   Personalized Prevention Plan  You are due for the preventive services outlined below.  Your care team is available to assist you in scheduling these services.  If you have already completed any of these items, please share that information with your care team to update in your medical record.  Health Maintenance Due   Topic Date Due     Zoster (Shingles) Vaccine (2 of 3) 12/27/2013     Annual Wellness Visit  12/04/2019     FALL RISK ASSESSMENT  12/04/2019     Depression Assessment  04/25/2020     Flu Vaccine (1) 09/01/2020       Preventing Falls in the Home  An adult or child can fall for many reasons. If you are an older adult, you may fall because your reaction time slows down. Your muscles and joints may get stiff, weak, or less flexible because of illness, medicines, or a physical condition. These things can also make a child more likely to fall or be injured in a fall.  Other health problems that make falls more likely include:    Arthritis    Dizziness or lightheadedness when you get out of bed (orthostatic hypotension)    History of a stroke    Dizziness    Anemia    Certain medicines taken for mental illness or to control blood pressure.    Problems with balance or gait    Bladder or urinary problems    History of falls with or without an injury    Changes in vision (vision impairment)    Changes in thinking skills and memory (cognitive impairment)  Injuries from a fall can include broken bones, dislocated joints, internal bleeding and cuts. When these injuries are serious enough, they can make it impossible for you or a child who is injured in a fall to live on his or her ownhome.  Prevention tips  To help prevent falls and fall-related injuries, follow the tips below.   Floors  Make floors safer by doing the following:     Put nonskid pads under area rugs.    Remove throw rugs.    Replace worn floor coverings.    Tack carpets firmly to each step on carpeted stairs. Put nonskid strips  on the edges of uncarpeted stairs.    Keep floors and stairs free of clutter and cords.    Arrange furniture so there are clear pathways.    Clean up any spills right away.    Wear shoes that fit.  Bathrooms    Make bathrooms safer by doing the following:     Install grab bars in the tub or shower.    Apply nonskid strips or put a nonskid rubber mat in the tub or shower.    Sit on a bath chair to bathe.    Use bathmats with nonskid backing.  Lighting and the environment  Improve lighting in your home by doing the following:     Keep a flashlight in each room. Or put a lamp next to the bed within easy reach.    Put nightlights in the bedrooms, hallways, kitchen, and bathrooms.    Make sure all stairways have good lighting.    Take your time when going up and down stairs.    Put handrails on both sides of stairs and in walkways for more support. To prevent injury to your wrist or arm, don t use handrails to pull yourself up.    Install grab bars to pull yourself up.    Move or rearrange items that you use often. This will make them easier to find or reach.    Look at your home to find any safety hazards. Especially look at doorways, walkways, and the driveway. Remove or repair any safety problems that you find.  Date Last Reviewed: 8/1/2016 2000-2019 [employer] [address] [facility], [place] [address]. All rights reserved. This information is not intended as a substitute for professional medical care. Always follow your healthcare professional's instructions.

## 2020-09-29 ENCOUNTER — VIRTUAL VISIT (OUTPATIENT)
Dept: PSYCHIATRY | Facility: CLINIC | Age: 85
End: 2020-09-29
Attending: FAMILY MEDICINE
Payer: MEDICARE

## 2020-09-29 ENCOUNTER — VIRTUAL VISIT (OUTPATIENT)
Dept: BEHAVIORAL HEALTH | Facility: CLINIC | Age: 85
End: 2020-09-29
Payer: MEDICARE

## 2020-09-29 DIAGNOSIS — F33.9 DEPRESSION, MAJOR, RECURRENT (H): Primary | ICD-10-CM

## 2020-09-29 DIAGNOSIS — F33.2 SEVERE EPISODE OF RECURRENT MAJOR DEPRESSIVE DISORDER, WITHOUT PSYCHOTIC FEATURES (H): Primary | ICD-10-CM

## 2020-09-29 PROCEDURE — 99207 ZZC NO BILLABLE SERVICE THIS VISIT: CPT | Performed by: SOCIAL WORKER

## 2020-09-29 PROCEDURE — 99204 OFFICE O/P NEW MOD 45 MIN: CPT | Mod: 95 | Performed by: PSYCHIATRY & NEUROLOGY

## 2020-09-29 RX ORDER — BUPROPION HYDROCHLORIDE 150 MG/1
150 TABLET, EXTENDED RELEASE ORAL 2 TIMES DAILY
Qty: 60 TABLET | Refills: 1 | Status: SHIPPED | OUTPATIENT
Start: 2020-09-29 | End: 2020-10-29

## 2020-09-29 ASSESSMENT — ANXIETY QUESTIONNAIRES
5. BEING SO RESTLESS THAT IT IS HARD TO SIT STILL: NOT AT ALL
GAD7 TOTAL SCORE: 4
7. FEELING AFRAID AS IF SOMETHING AWFUL MIGHT HAPPEN: NOT AT ALL
GAD7 TOTAL SCORE: 4
3. WORRYING TOO MUCH ABOUT DIFFERENT THINGS: MORE THAN HALF THE DAYS
7. FEELING AFRAID AS IF SOMETHING AWFUL MIGHT HAPPEN: NOT AT ALL
GAD7 TOTAL SCORE: 4
1. FEELING NERVOUS, ANXIOUS, OR ON EDGE: NOT AT ALL
2. NOT BEING ABLE TO STOP OR CONTROL WORRYING: MORE THAN HALF THE DAYS
4. TROUBLE RELAXING: NOT AT ALL
6. BECOMING EASILY ANNOYED OR IRRITABLE: NOT AT ALL

## 2020-09-29 ASSESSMENT — PATIENT HEALTH QUESTIONNAIRE - PHQ9
SUM OF ALL RESPONSES TO PHQ QUESTIONS 1-9: 14
10. IF YOU CHECKED OFF ANY PROBLEMS, HOW DIFFICULT HAVE THESE PROBLEMS MADE IT FOR YOU TO DO YOUR WORK, TAKE CARE OF THINGS AT HOME, OR GET ALONG WITH OTHER PEOPLE: SOMEWHAT DIFFICULT
SUM OF ALL RESPONSES TO PHQ QUESTIONS 1-9: 14

## 2020-09-29 NOTE — PROGRESS NOTES
"Erwin Tolliver is a 85 year old female who is being evaluated via a billable video visit.      The patient has been notified of following:     \"This video visit will be conducted via a call between you and your physician/provider. We have found that certain health care needs can be provided without the need for an in-person physical exam.  This service lets us provide the care you need with a video conversation.  If a prescription is necessary we can send it directly to your pharmacy.  If lab work is needed we can place an order for that and you can then stop by our lab to have the test done at a later time.    Video visits are billed at different rates depending on your insurance coverage.  Please reach out to your insurance provider with any questions.    If during the course of the call the physician/provider feels a video visit is not appropriate, you will not be charged for this service.\"    Patient has given verbal consent for Video visit? Yes  How would you like to obtain your AVS? MyChart  If you are dropped from the video visit, the video invite should be resent to: Text to cell phone: see Epic  Will anyone else be joining your video visit? No  {If patient encounters technical issues they should call [phone] :099191}    Telemedicine Visit: The patient's condition can be safely assessed and treated via synchronous audio and visual telemedicine encounter.      Reason for Telemedicine Visit: Covid-19 Pandemic    Originating Site (Patient Location): Patient's home    Distant Site (Provider Location): Provider Remote Setting    Consent:  The patient/guardian has verbally consented to: the potential risks and benefits of telemedicine (video visit) versus in person care; bill my insurance or make self-payment for services provided; and responsibility for payment of non-covered services.     Mode of Communication:  Video Conference via viaForensics    As the provider I attest to compliance with applicable " "laws and regulations related to telemedicine.                                                             Outpatient Psychiatric Evaluation- Standard  Adult    Name:  Erwin Tolliver  : 1935    Source of Referral:  Primary Care Provider: Jacqueline Brandt MD   Current Psychotherapist: None     Identifying Data:  Patient is a 85 year old,   White American female  who presents for initial visit with me.  Patient is currently retired. Patient attended the phone/viseo session alone. Patient prefers to be called: \"Erwin\"    Chief Complaint:  Consult    HPI:  Erwin Tolliver is a 85 year old female with past history including grief and depression who presents today for psychiatric evaluation. ***        Questions/Thoughts for Dr. Johnston:   Highlights:   in April after 60 years of marriage, just exists, feels like she's \"a fish out of water\"  Current Symptoms: isolating, low mood, low energy, sleep cycle has changed,  Worse/Better/Same: meds:effexor  Side Effects:effexor and wellbutrin for a few years, not working well right now  Mood:   Suicidality: denies, worries about dying  Substance Use:  Therapist: no,   Medication Questions/Requests:   Born and raised in [place]. Parents and siblings are . Has 4 adult children and numerous grand children. Good support.     Therapy about 15 years ago.   Painted kitchen  Watches TV-soap operas  netflix  Cards  Puzzles    Has not gone to the cabin, did last year with   Better when grandson comes home  Had a lot of energy leading up until a couple months ago         Depression  Grief    Current Meds:  Wellbutrin   Effexor-  Trazodone 100      Past diagnoses include: ***  Current medications include: has a current medication list which includes the following prescription(s): acetaminophen, bupropion, ipratropium, levothyroxine, metoprolol succinate er, multiple vitamins-minerals, nitroglycerin, omeprazole, " "pseudoephedrine-ibuprofen, simvastatin, sumatriptan, trazodone, and venlafaxine.   Medication side effects: {[facility]:462920}  Current stressors include: {[facility] STRESS:844587}  Coping mechanisms and supports include: {[facility] COPE:275716}    Psychiatric Review of Symptoms:  Depression: {[facility] DEPRESSION SXS:574021}   PHQ-9 scores:   PHQ-9 SCORE 10/12/2018 1/25/2019 10/25/2019   PHQ-9 Total Score - - -   PHQ-9 Total Score MyChart - Incomplete -   PHQ-9 Total Score 0 - 3     Driss:  {[facility] DRISS SXS:614005::\"No symptoms\"}   MDQ Score: {[facility] MDQ:774158}  Anxiety: [facility]171113[facility]    MELISSA-7 scores:    MELISSA-7 SCORE 1/31/2017 5/6/2017 6/14/2017   Total Score - - -   Total Score 0 (minimal anxiety) 0 (minimal anxiety) -   Total Score - - 5     Panic:  {PANIC:563044}   Agoraphobia:  {YES / NO:662740::\"Yes\"}   PTSD:  {[facility] PTSD SXS:062098::\"No symptoms\"}   OCD:  {[facility] OCD SXS:938808::\"No symptoms\"}   Psychosis: {PSYCHOSIS:418165::\"No symptoms\"}   ADD / ADHD: {[facility] ADD SXS:107210::\"No symptoms\"}  Gambling or shoplifting: {YES / NO:943807::\"No\"}   Eating Disorder:  {[facility] ED SXS:561673}  Sleep:   {[facility] SLEEP:192096}     All other ROS negative.     A 12-item WHODAS 2.0 assessment was not completed by this provider today. See Epic for any previously completed WHODAS assessments.     Medical Review of Systems:  10 systems (general, cardiovascular, respiratory, eyes, ENT, endocrine, GI, , M/S, neurological) were reviewed. Most pertinent finding(s) is/are: ***. The remaining systems are all unremarkable.    A 12-item WHODAS 2.0 assessment was not completed.    Psychiatric History:   Hospitalizations: {[facility]:998393}  History of Commitment? {YES / NO:160447::\"No\"}   Past Treatment: {[facility] HEALTH SERVICES:460661}  Suicide Attempts: {YES / NO:755910::\"No\"}   Current Suicide Risk: Suicide Assessment Completed Today.  Self-injurious Behavior: {[facility] SIB:383748::\"Denies\"}  Electroconvulsive Therapy (ECT) or Transcranial " "Magnetic Stimulation (TMS): {YES / NO:802383::\"No\"}   [facility] Genetic Testing: {YES / NO:962604::\"No\"}     Past medication trials include but are not limited to:   Prozac  Zoloft    Effexor-XR - current  Wellbutrin XL - curent    Substance Use History:  Current Use of Drugs/Alcohol: {[facility] SUBSTANCES:656396::\"Denies\"}   Past Use of Drugs/Alcohol: ***  Patient {[facility] DRINKING PROBLEMS:285433}.   Patient {[facility] RECEIVED CHEMICAL DEPENDENCY TREATMENT:111600}  Recovery Programming Involvement: {[facility] RECOVERY GROUPS:580232::\"Not Applicable\"}    Tobacco use: {[facility] YES/NO:159298}    Based on the clinical interview, there  {Indications:707168}. Continue to monitor.   Discussed effect of substance use on overall health.     Past Medical History:  Past Medical History:   Diagnosis Date     Arthritis      Depressive disorder      Depressive disorder, not elsewhere classified      Hepatitis, unspecified 1993    From [facility]      Migraine with aura, without mention of intractable migraine without mention of status migrainosus      Need for prophylactic hormone replacement therapy (postmenopausal)      Palpitations      SVT (supraventricular tachycardia) (H) 10/8/2012     Thyroid disease      Unspecified essential hypertension       Surgery:   Past Surgical History:   Procedure Laterality Date     APPENDECTOMY       BACK SURGERY      bulged disc     C TOTAL ABDOM HYSTERECTOMY      Hysterectomy, Total Abdominal and BSO -benign - age 41     COLONOSCOPY       ENT SURGERY       EYE SURGERY       HC COLONOSCOPY W BIOPSY  11/07/06      KNEE SCOPE, DIAGNOSTIC      Arthroscopy, Knee     HC REDUCTION OF LARGE BREAST       ORTHOPEDIC SURGERY       SEPTOPLASTY N/A 6/20/2017    Procedure: SEPTOPLASTY;  Septoplasty, Submucosal resection of the turbinates;  Surgeon: Edwin Cox MD;  Location: [facility] OR     SHOULDER SURGERY  01/16/2019     Food and Medicine Allergies:     Allergies   Allergen Reactions     Aspirin      " "headaches,tremors,nausea     Augmentin [Amoxicillin-Pot Clavulanate] Nausea and Vomiting     Seizures or Head Injury: {[facility] YES/NO HEAD INJURY:481406}  Diet: {[facility] DIET:517545}  Exercise: {[facility] EXERCISE:930497::\"No regular exercise program\"}  Supplements: Reviewed per Electronic Medical Record Today    Current Medications:    Current Outpatient Medications:      ACETAMINOPHEN PO, , Disp: , Rfl:      buPROPion (WELLBUTRIN XL) 300 MG 24 hr tablet, Take 1 tablet (300 mg) by mouth every morning, Disp: 90 tablet, Rfl: 3     ipratropium (ATROVENT) 0.03 % spray, Spray 2 sprays into both nostrils 3 times daily, Disp: 1 Box, Rfl: 3     levothyroxine (SYNTHROID/LEVOTHROID) 88 MCG tablet, Take 1 tablet (88 mcg) by mouth daily, Disp: 90 tablet, Rfl: 3     metoprolol succinate ER (TOPROL-XL) 100 MG 24 hr tablet, Take 1 tablet (100 mg) by mouth daily, Disp: 90 tablet, Rfl: 3     Multiple Vitamins-Minerals (PRESERVISION AREDS PO), Take 1 tablet by mouth daily, Disp: , Rfl:      nitroGLYcerin (NITROSTAT) 0.4 MG sublingual tablet, Place 1 tablet (0.4 mg) under the tongue once for 1 dose For chest pain place 1 tablet under the tongue every 5 minutes for 3 doses. If symptoms persist 5 minutes after 1st dose call 911., Disp: 1 tablet, Rfl: 0     omeprazole (PRILOSEC) 20 MG DR capsule, Take 1 capsule (20 mg) by mouth every other day, Disp: 45 capsule, Rfl: 3     Pseudoephedrine-Ibuprofen (ADVIL COLD/SINUS PO), , Disp: , Rfl:      simvastatin (ZOCOR) 20 MG tablet, TAKE ONE TABLET BY MOUTH AT BEDTIME, Disp: 90 tablet, Rfl: 3     SUMAtriptan (IMITREX) 25 MG tablet, Take 1-2 tablets (25-50 mg) by mouth at onset of headache for migraine May repeat in 2 hours. Max 8 tablets/24 hours., Disp: 9 tablet, Rfl: 1     traZODone (DESYREL) 100 MG tablet, Take 1 tablet (100 mg) by mouth At Bedtime, Disp: 90 tablet, Rfl: 3     venlafaxine (EFFEXOR-XR) 150 MG 24 hr capsule, TAKE 1 CAPSULE BY MOUTH DAILY, Disp: 90 capsule, Rfl: 3    The [place] " Prescription Monitoring Program has been reviewed and there are no concerns about diversionary activity for controlled substances at this time.  No data for controlled substances over the last one year. ***    Vital Signs:  None since this is a phone/video visit.     Labs:  Most recent laboratory results reviewed and the pertinent results include:   Office Visit on 09/22/2020   Component Date Value Ref Range Status     Cholesterol 09/22/2020 173  <200 mg/dL Final     Triglycerides 09/22/2020 159* <150 mg/dL Final    Comment: Borderline high:  150-199 mg/dl  High:             200-499 mg/dl  Very high:       >499 mg/dl       HDL Cholesterol 09/22/2020 58  >49 mg/dL Final     LDL Cholesterol Calculated 09/22/2020 83  <100 mg/dL Final    Desirable:       <100 mg/dl     Non HDL Cholesterol 09/22/2020 115  <130 mg/dL Final     Sodium 09/22/2020 138  133 - 144 mmol/L Final     Potassium 09/22/2020 4.0  3.4 - 5.3 mmol/L Final     Chloride 09/22/2020 105  94 - 109 mmol/L Final     Carbon Dioxide 09/22/2020 30  20 - 32 mmol/L Final     Anion Gap 09/22/2020 3  3 - 14 mmol/L Final     Glucose 09/22/2020 92  70 - 99 mg/dL Final     Urea Nitrogen 09/22/2020 15  7 - 30 mg/dL Final     Creatinine 09/22/2020 0.84  0.52 - 1.04 mg/dL Final     GFR Estimate 09/22/2020 63  >60 mL/min/[1.73_m2] Final    Comment: Non  GFR Calc  Starting 12/18/2018, serum creatinine based estimated GFR (eGFR) will be   calculated using the Chronic Kidney Disease Epidemiology Collaboration   (CKD-EPI) equation.       GFR Estimate If Black 09/22/2020 73  >60 mL/min/[1.73_m2] Final    Comment:  GFR Calc  Starting 12/18/2018, serum creatinine based estimated GFR (eGFR) will be   calculated using the Chronic Kidney Disease Epidemiology Collaboration   (CKD-EPI) equation.       Calcium 09/22/2020 8.8  8.5 - 10.1 mg/dL Final     Bilirubin Total 09/22/2020 0.7  0.2 - 1.3 mg/dL Final     Albumin 09/22/2020 3.9  3.4 - 5.0 g/dL Final      Protein Total 09/22/2020 7.5  6.8 - 8.8 g/dL Final     Alkaline Phosphatase 09/22/2020 69  40 - 150 U/L Final     ALT 09/22/2020 27  0 - 50 U/L Final     AST 09/22/2020 19  0 - 45 U/L Final     TSH 09/22/2020 3.38  0.40 - 4.00 mU/L Final     Creatinine Urine 09/22/2020 48  mg/dL Final     Albumin Urine mg/L 09/22/2020 20  mg/L Final     Albumin Urine mg/g Cr 09/22/2020 41.08* 0 - 25 mg/g Cr Final   Orders Only on 09/14/2020   Component Date Value Ref Range Status     COVID-19 Virus PCR to [facility] - So* 09/14/2020 Nasopharyngeal   Final     COVID-19 Virus PCR to [facility] - Re* 09/14/2020 Not Detected   Final    Comment: Collection of multiple specimens from the same patient may be necessary to   detect the virus. The possibility of a false negative should be considered if   the patient's recent exposure or clinical presentation suggests 2019 nCOV   infection and diagnostic tests for other causes of illness are negative.   Repeat testing may be considered in this setting.  Patient sample was heat inactivated and amplified using the HDPCR SARS-CoV-2   assay ([employer] The HDPCRTM SARS-CoV-2 assay is a reverse   transcription real-time polymerase chain reaction (qRT-PCR) test intended for   the qualitative detection of nucleic acid  from SARS-CoV-2 in human nasopharyngeal swabs, oropharyngeal swabs, anterior   nasal swabs, mid-turbinate nasal swabs as well as nasal aspirate, nasal wash,   and bronchoalveolar lavage (BAL) specimens from individuals who are suspected   of COVID-19 by their healthcare provider.  A negative result does not rule out the presence of real-time PCR inhibitors   in the sp                           ecimen or COVID-19 RNA in concentrations below the limit of detection   of the assay. The possibility of a false negative should be considered if the   patients recent exposure or clinical presentation suggests COVID-19.   Additional testing or repeat testing requires consultation with the    laboratory.  Nasopharyngeal specimen is the preferred choice for swab-based SARS CoV2   testing. When collection of a nasopharyngeal swab is not possible the   following are acceptable alternatives:  an oropharyngeal (OP) specimen collected by a healthcare professional, or a   nasal mid-turbinate (NMT) swab collected by a healthcare professional or by   onsite self-collection (using a flocked tapered swab), or an anterior nares   specimen collected by a healthcare professional or by onsite self-collection   (using a round foam swab). (Centers for Disease Control)  Testing performed by [facility] ([facility]) [address] [facility] [address]  The test performance characteristics were determined by [facility]. It has not been   cleared or approved by the FDA.  The laboratory is regulated under the Clinical Laboratory Improvement   Amendments of 1988 (CLIA-88) as qualified to perform high-complexity testing.   This test is used for clinical purposes. It should not be regarded as   investigational or for research.     Office Visit on 10/25/2019   Component Date Value Ref Range Status     Cholesterol 10/25/2019 172  <200 mg/dL Final     Triglycerides 10/25/2019 161* <150 mg/dL Final    Comment: Borderline high:  150-199 mg/dl  High:             200-499 mg/dl  Very high:       >499 mg/dl       HDL Cholesterol 10/25/2019 65  >49 mg/dL Final     LDL Cholesterol Calculated 10/25/2019 75  <100 mg/dL Final    Desirable:       <100 mg/dl     Non HDL Cholesterol 10/25/2019 107  <130 mg/dL Final     TSH 10/25/2019 2.60  0.40 - 4.00 mU/L Final     Sodium 10/25/2019 141  133 - 144 mmol/L Final     Potassium 10/25/2019 4.0  3.4 - 5.3 mmol/L Final     Chloride 10/25/2019 107  94 - 109 mmol/L Final     Carbon Dioxide 10/25/2019 27  20 - 32 mmol/L Final     Anion Gap 10/25/2019 7  3 - 14 mmol/L Final     Glucose 10/25/2019 103* 70 - 99 mg/dL  Final     Urea Nitrogen 10/25/2019 16  7 - 30 mg/dL Final     Creatinine 10/25/2019 0.99  0.52 - 1.04 mg/dL Final     GFR Estimate 10/25/2019 52* >60 mL/min/[1.73_m2] Final    Comment: Non  GFR Calc  Starting 2018, serum creatinine based estimated GFR (eGFR) will be   calculated using the Chronic Kidney Disease Epidemiology Collaboration   (CKD-EPI) equation.       GFR Estimate If Black 10/25/2019 61  >60 mL/min/[1.73_m2] Final    Comment:  GFR Calc  Starting 2018, serum creatinine based estimated GFR (eGFR) will be   calculated using the Chronic Kidney Disease Epidemiology Collaboration   (CKD-EPI) equation.       Calcium 10/25/2019 8.8  8.5 - 10.1 mg/dL Final     Bilirubin Total 10/25/2019 0.5  0.2 - 1.3 mg/dL Final     Albumin 10/25/2019 4.0  3.4 - 5.0 g/dL Final     Protein Total 10/25/2019 7.3  6.8 - 8.8 g/dL Final     Alkaline Phosphatase 10/25/2019 69  40 - 150 U/L Final     ALT 10/25/2019 21  0 - 50 U/L Final     AST 10/25/2019 13  0 - 45 U/L Final     Creatinine Urine 10/25/2019 106  mg/dL Final     Albumin Urine mg/L 10/25/2019 37  mg/L Final     Albumin Urine mg/g Cr 10/25/2019 35.09* 0 - 25 mg/g Cr Final     Most recent labs reviewed and no new labs.   {[facility]:925224}     Family History:   Patient reported family history includes:   Family History   Problem Relation Age of Onset     Alzheimer Disease Mother      Arthritis Sister          at 72 years     Lipids Sister      Osteoporosis Sister         osteoporosis     Genetic Disorder Sister         down syndrome     Alzheimer Disease Sister      Thyroid Disease Sister      Cardiovascular Brother          at 72 years of CHF     Mental Illness History: {[facility] HX:423826}  Substance Abuse History: {[facility] HX:674277}  Suicide History: {[facility] HX:006868}  Med[facility]: {[facility]:010033}     Social History:   Birth place: {[facility] to [facility]}  Childhood: {[facility]  INTACT:821144}  Siblings:  {NUMBERS 1-6:781924} Brother(s),  {NUMBERS 1-6:273261} Sister(s)  Highest education level was {[facility] EDUCATIONAL LEVEL:846923}.   Employment Status: ***  Current Living situation:  *** Feels safe at home.  Children: {NUMBERS 1-6:019166}   Firearms/Weapons Access: {[facility] YES/NO FIREARM:579490}   Service: {[facility] YES/NO :698919}    Legal History:  {[facility] YES/NO LEGAL:518471}    Significant Losses / Trauma / Abuse / Neglect Issues:  There are {Losses:727007}.   Issues of possible neglect {Present:088575}.   {SAFETY PLAN:127262}    Comprehensive Examination (limited due to virtual visit format, phone/video):  Vital Signs:  Vitals: There were no vitals taken for this visit.  General/Constitutional:  Appearance: awake, alert, adequately groomed, appeared stated age and no apparent distress  Attitude:  cooperative   Eye Contact:  good  Musculoskeletal:  Muscle Strength and Tone: no gross abnormalities by observation  Psychomotor Behavior:  no evidence of tardive dyskinesia, dystonia, or tics  Gait and Station: normal, no gross abnormalities noted by observation  Psychiatric:  Speech:  clear, coherent, regular rate, rhythm, and volume  Associations:  no loose associations  Thought Process:  logical, linear and goal oriented  Thought Content:  ***no evidence of suicidal ideation or homicidal ideation, no evidence of psychotic thought, no auditory hallucinations present and no visual hallucinations present  Mood:  { :953812}  Affect:  { :030283}  Insight:  { :097995}  Judgment:  { :620478}, adequate for safety  Impulse Control:  { :777494}  Neurological:  Oriented to:  person, place, time, and situation  Attention Span and Concentration:  normal  Language: intact  Recent and Remote Memory:  Intact to interview. Not formally assessed. No amnesia.  Fund of Knowledge: appropriate    Strengths and Opportunities:   Erwin Tolliver identified the following strengths or resources that may  help she succeed in counseling: {[facility] SUCCEED:724875}. Things that may interfere with the patient's success include:  {[facility] INTERFERE:202860}.    There are no *** language or communication issues or need for modification in treatment.   There are no *** ethnic, cultural or Holiness factors that may be relevant for therapy.  Client identified their preferred language to be English.  Client does not *** need the assistance of an  or other support involved in therapy.    Suicide Risk Assessment:  Today Erwin Tolliver reports ***. In addition, there are notable risk factors for self-harm, including {[facility] SUICIDE RISKS:349812}. However, risk is mitigated by {[facility] SUICIDE PROTECT:063692}. Therefore, based on all available evidence including the factors cited above, Erwin Tolliver does not appear to be at imminent risk for self-harm, does not meet criteria for a 72-hr hold, and therefore remains appropriate for ongoing outpatient level of care.  A thorough assessment of risk factors related to suicide and self-harm have been reviewed and are noted above. The patient convincingly denies acute suicidality on several occasions. Local community safety resources reviewed and printed for patient to use if needed. There was no deceit detected, and the patient presented in a manner that was believable.     DSM5  Diagnosis:  {DSM5  Diagnosis:496904}    Medical Comorbidities Include:   Patient Active Problem List    Diagnosis Date Noted     Other chronic sinusitis 03/01/2017     Priority: Medium     Penicillin allergy 03/01/2017     Priority: Medium     Hypertension, goal below 150/90 05/05/2015     Priority: Medium     Macular degeneration (senile) of retina 04/04/2014     Priority: Medium     Advanced directives, counseling/discussion 10/01/2013     Priority: Medium     Moderate recurrent major depression (H) 10/01/2013     Priority: Medium     CKD (chronic kidney disease) stage 3, GFR 30-59 ml/min (H)  08/15/2012     Priority: Medium     Atopic rhinitis 01/09/2012     Priority: Medium     GERD (gastroesophageal reflux disease) 08/02/2011     Priority: Medium     Osteopenia 11/15/2010     Priority: Medium     Hyperlipidemia, unspecified 10/31/2010     Priority: Medium     Hypothyroidism 12/14/2006     Priority: Medium     Migraine with aura      Priority: Medium       Impression:  Erwin Tolliver ***.       Medication side effects and alternatives reviewed. Health promotion activities recommended and reviewed today. All questions addressed. Education and counseling completed regarding risks and benefits of medications and psychotherapy options. Recommend therapy for additional support.     Treatment Plan:    Continue ***     Discontinue ***     Start **     Continue therapy as planned***.    Continue all other medications as reviewed per electronic medical record today.     Safety plan reviewed. To the Emergency Department as needed or call after hours crisis line at [phone] or [phone]. [place] Crisis Text Line: Text [place] to 580764  or  Suicide LifeLine Chat: suicidepreventionGraphScienceline.org/chat    Schedule an appointment with me in *** weeks or sooner as needed.  Call [facility] at [phone] to schedule.    Follow up with primary care provider as planned or sooner if needed for acute medical concerns.    Call the psychiatric nurse line with medication questions or concerns at [phone].    Magnetichart may be used to communicate with your provider, but this is not intended to be used for emergencies.    Patient Education:  ***    Community Resources:    National Suicide Prevention Lifeline: 597.557.3649 (TTY: 722.646.8243). Call anytime for help.  (www.suicidepreventionlifeline.org)  National [place] on Mental Illness (www.neno.org): 652.701.3466 or 114-425-5602.   Mental Health Association (www.mentalhealth.org): [phone] or [phone].  [place] Crisis Text Line: Text [place] to  565887  Suicide LifeLine Chat: suicidepreventionlifeline.org/chat    Administrative Billing:   Phone Call/Video Duration: 27 Minutes  Start: 3:22p  Stop: 3:49p      Patient Status:  Patient will continue to be seen for ongoing consultation and stabilization.    Signed:   Jasmyne Johnston DO  [facility]

## 2020-09-29 NOTE — Clinical Note
Please call this patient to get them scheduled for a follow-up visit in 8 weeks. Please schedule with me and the [facility].  I am working on getting some slots opened up to see her sooner than the 8 weeks, but please schedule her on the books for now.  Thanks!

## 2020-09-29 NOTE — PROGRESS NOTES
PATIENT'S NAME: Erwin Tolliver  PREFERRED NAME: Erwin  PREFERRED PRONOUNS:  she/hers  MRN:   [id number]  :   1935   ACCT. NUMBER: [id number]  DATE OF SERVICE: 20  START TIME: 245PM  END TIME: 310PM    BRIEF ADULT DIAGNOSTIC ASSESSMENT    Telemedicine Visit: The patient's condition can be safely assessed and treated via synchronous audio and visual telemedicine encounter.      Reason for Telemedicine Visit: Patient has requested telehealth visit    Originating Site (Patient Location): Patient's home    Distant Site (Provider Location): Provider Remote Setting    Consent:  The patient/guardian has verbally consented to: the potential risks and benefits of telemedicine (video visit) versus in person care; bill my insurance or make self-payment for services provided; and responsibility for payment of non-covered services.     Mode of Communication:  Video Conference via [employer]    As the provider I attest to compliance with applicable laws and regulations related to telemedicine.    Identifying Information:  Patient is a 85 year old, .  The pronoun use throughout this assessment reflects the patient's chosen pronoun.  Patient was referred for an assessment by primary care provider.  Patient attended the session alone.     Chief Complaint:   The reason for seeking services at this time is: increased depression  The problem(s) began after   in 2020. Patient has not attempted to resolve these concerns in the past. Meds for depression for many years.    Does the client have any condition that is currently presenting as a potential to harm themselves or others (severe withdrawal, serious medical condition, severe emotional/behavioral problem)? No.  Proceed with assessment.    Review of Symptoms per patient report:  Depression: Change in sleep, Lack of interest, Excessive or inappropriate guilt, Change in energy level, Feeling sad, down, or depressed, Withdrawn and Frequent  crying  Megan:  No Symptoms  Psychosis: No Symptoms  Anxiety: Excessive worry and Irritability  Panic:  Palpitations and Shortness of breath  Post Traumatic Stress Disorder:  No Symptoms   Eating Disorder: No Symptoms  ADD / ADHD:  No symptoms  Conduct Disorder: No symptoms  Autism Spectrum Disorder: No symptoms  Obsessive Compulsive Disorder: No Symptoms    Sleep: has changed since      Caffeine: unremarkable  Tobacco: no    Current alcohol use: drinks 1-2 black russians at night  Current drug use: none    Rating Scales:  PHQ-9:   [facility] Follow-up to PHQ 10/12/2018 10/25/2019 2020   PHQ-9 9. Suicide Ideation past 2 weeks Not at all Not at all Not at all   Thoughts of suicide or self harm in past 2 weeks - - -   PHQ-9 Self harm plan? - - -   PHQ-9 Self harm action? - - -   PHQ-9 Safety concerns? - - -      GAD7:    MELISSA-7 SCORE 2017   Total Score - - -   Total Score 0 (minimal anxiety) - 4 (minimal anxiety)   Total Score - 5 4     CGI:  First:  Considering your total clinical experience with this particular patient population, how severe are the patient's symptoms at this time?: 4 (2020  4:36 PM)    Most recent:  No data recorded    WHODAS:   WHODAS 2.0 Total Score 2020   Total Score 12        CAGE:  No flowsheet data found.    CAGE-AID score  > 1 is a positive screen, suggesting further discussion is needed to determine if evaluation for alcohol or substance abuse is appropriate.  A score > 2 is considered clinically significant, suggesting further evaluation of alcohol or substance-related problems is indicated.      Personal Medical History:  Past Medical History:   Diagnosis Date     Arthritis      Depressive disorder      Depressive disorder, not elsewhere classified      Hepatitis, unspecified     From [facility]     Hypertension      Migraine with aura, without mention of intractable migraine without mention of status migrainosus      Need for prophylactic  hormone replacement therapy (postmenopausal)      Palpitations      SVT (supraventricular tachycardia) (H) 10/8/2012     Thyroid disease      Unspecified essential hypertension        Patient has received mental health services in the past: Dr. Feliz?? many years ago.  Psychiatric Hospitalizations: None.  Patient denies a history of civil commitment. Currently, patient is not receiving other mental health services.  These include none.     Patient does not report a history of head injury / trauma / cognitive impairment / seizures.      Current Medications:  Current Outpatient Medications   Medication Sig Dispense Refill     ACETAMINOPHEN PO        ipratropium (ATROVENT) 0.03 % spray Spray 2 sprays into both nostrils 3 times daily 1 Box 3     levothyroxine (SYNTHROID/LEVOTHROID) 88 MCG tablet Take 1 tablet (88 mcg) by mouth daily 90 tablet 3     metoprolol succinate ER (TOPROL-XL) 100 MG 24 hr tablet Take 1 tablet (100 mg) by mouth daily 90 tablet 3     Multiple Vitamins-Minerals (PRESERVISION AREDS PO) Take 1 tablet by mouth daily       nitroGLYcerin (NITROSTAT) 0.4 MG sublingual tablet Place 1 tablet (0.4 mg) under the tongue once for 1 dose For chest pain place 1 tablet under the tongue every 5 minutes for 3 doses. If symptoms persist 5 minutes after 1st dose call 911. 1 tablet 0     omeprazole (PRILOSEC) 20 MG DR capsule Take 1 capsule (20 mg) by mouth every other day 45 capsule 3     Pseudoephedrine-Ibuprofen (ADVIL COLD/SINUS PO)        simvastatin (ZOCOR) 20 MG tablet TAKE ONE TABLET BY MOUTH AT BEDTIME 90 tablet 3     SUMAtriptan (IMITREX) 25 MG tablet Take 1-2 tablets (25-50 mg) by mouth at onset of headache for migraine May repeat in 2 hours. Max 8 tablets/24 hours. 9 tablet 1     traZODone (DESYREL) 100 MG tablet Take 1 tablet (100 mg) by mouth At Bedtime 90 tablet 3     venlafaxine (EFFEXOR-XR) 150 MG 24 hr capsule TAKE 1 CAPSULE BY MOUTH DAILY 90 capsule 3        Allergies:  Allergies   Allergen  Reactions     Aspirin      headaches,tremors,nausea     Augmentin [Amoxicillin-Pot Clavulanate] Nausea and Vomiting       Family Psychiatric History:  Patient did not report a family history of mental health concerns.     Family History     Problem (# of Occurrences) Relation (Name,Age of Onset)    Alzheimer Disease (2) Mother, Sister (Ruth)    Arthritis (1) Sister (Samara):  at 72 years    Cardiovascular (1) Brother:  at 72 years of CHF    Genetic Disorder (1) Sister (Ruth): down syndrome    Lipids (1) Sister (Samara)    Osteoporosis (1) Sister (Samara): osteoporosis    Thyroid Disease (1) Sister (Ruth)          Social/Family History:  Patient reported they grew up in [place].  They were raised by biological parents. Patient reported that  her childhood was good, loving.  Patient denies experiencing childhood abuse/neglect. Patient described their current relationships with family of origin, parents and siblings .    The patient has been  x1 times and has 4 children.     2020.    they had a good relationship. Patient reported having no good friends.     Cultural influences and impact on patient's life structure, values, norms, and healthcare: none identified. Patient identified their preferred language to be English. Patient reported they does not need the assistance of an  or other support involved in treatment.       Educational/Occupational History:  Patient reported   highest education level was high school graduate. The patient did not serve in the .  Patient is currently retired.      Social History     Socioeconomic History     Marital status:      Spouse name: Marcellus     Number of children: 4     Years of education: 12     Highest education level: Not on file   Occupational History     Occupation: retired   Social Needs     Financial resource strain: Not on file     Food insecurity     Worry: Not on file     Inability: Not on file      Transportation needs     Medical: Not on file     Non-medical: Not on file   Tobacco Use     Smoking status: Former Smoker     Packs/day: 0.50     Years: 10.00     Pack years: 5.00     Last attempt to quit: 1966     Years since quittin.7     Smokeless tobacco: Never Used     Tobacco comment: no smokers in household   Substance and Sexual Activity     Alcohol use: No     Alcohol/week: 1.7 standard drinks     Comment: beerx2/ x8per month     Drug use: No     Sexual activity: Yes     Partners: Male     Birth control/protection: Surgical     Comment: Complete Hysterectomy   Lifestyle     Physical activity     Days per week: Not on file     Minutes per session: Not on file     Stress: Not on file   Relationships     Social connections     Talks on phone: Not on file     Gets together: Not on file     Attends Orthodox service: Not on file     Active member of club or organization: Not on file     Attends meetings of clubs or organizations: Not on file     Relationship status: Not on file     Intimate partner violence     Fear of current or ex partner: Not on file     Emotionally abused: Not on file     Physically abused: Not on file     Forced sexual activity: Not on file   Other Topics Concern     Parent/sibling w/ CABG, MI or angioplasty before 65F 55M? No   Social History Narrative     Not on file       Patient reported that they have not been involved with the legal system.   Patient denies being on probation / parole / under the jurisdiction of the court.    Current Mental Status Exam:   Appearance:   Appropriate    Eye Contact:   Good   Psychomotor:   Normal   Attitude / Demeanor:  Cooperative   Speech      Rate / Production:  Normal/ Responsive      Volume:   Normal  volume      Language:   intact  Mood:    Depressed   Affect:    Appropriate    Thought Content:  Clear   Thought Process:  Coherent  Logical       Associations:  No loosening of associations  Insight:    Good   Judgment:   Intact    Orientation:   All  Attention/concentration: Good      Safety Assessment:   Current Safety Concerns:  [place] Suicide Severity Rating Scale (Short Version)  [place] Suicide Severity Rating (Short Version) 4/10/2020 9/29/2020   Over the past 2 weeks have you felt down, depressed, or hopeless? no yes   Over the past 2 weeks have you had thoughts of killing yourself? no no   Have you ever attempted to kill yourself? no no     Patient denies current homicidal ideation and behaviors.  Patient denies current self-injurious ideation and behaviors.    Patient denied risk behaviors associated with substance use.  Patient denies any high risk behaviors associated with mental health symptoms.  Patient reports the following current concerns for their personal safety: None.  Patient reports there no firearms in the house.     History of Safety Concerns:  Patient denied a history of homicidal ideation.     Patient denied a history of personal safety concerns.    Patient denied a history of assaultive behaviors.    Patient denied a history of sexual assault behaviors.     Patient denied a history of risk behaviors associated with substance use.  Patient denies any history of high risk behaviors associated with mental health symptoms.  Patient reports the following protective factors: positive relationships positive family connections, safe and stable environment, adherence with prescribed medication and positive social skills    Risk Plan:  See Preliminary Treatment Plan for Safety and Risk Management Plan    Diagnosis:  Diagnostic Criteria:   CRITERIA (A-C) REPRESENT A MAJOR DEPRESSIVE EPISODE - SELECT THESE CRITERIA   - Depressed mood. Note: In children and adolescents, can be irritable mood.     - Diminished interest or pleasure in all, or almost all, activities.    - Fatigue or loss of energy.    - Diminished ability to think or concentrate, or indecisiveness.   B) The symptoms cause clinically significant distress or  impairment in social, occupational, or other important areas of functioning  C) The episode is not attributable to the physiological effects of a substance or to another medical condition  D) The occurence of major depressive episode is not better explained by other thought / psychotic disorders  E) There has never been a manic episode or hypomanic episode      Patient's Strengths and Limitations:  Patient identified the following strengths or resources that will help them succeed in treatment: family support and strong social skills. Things that may interfere with the patient's success in treatment include: few friends.     Recommendations:     1. Plan for Safety and Risk Management:Recommended that patient call 911 or go to the local ED should there be a change in any of these risk factors..  Report to child / adult protection services was NA.      2. Resources/Service Plan:       services are not indicated.     Modifications to assist communication are not indicated.     Additional disability accommodations are not indicated.      3. Collaboration:  Collaboration / coordination of treatment will be initiated with the following support professionals: none.      4.  Referrals:   The following referral(s) will be initiated: none.       Staff Name/Credentials:  Madelin [facility]  September 29, 2020  Answers for HPI/ROS submitted by the patient on 9/29/2020   If you checked off any problems, how difficult have these problems made it for you to do your work, take care of things at home, or get along with other people?: Somewhat difficult  PHQ9 TOTAL SCORE: 14  MELISSA 7 TOTAL SCORE: 4

## 2020-09-30 ASSESSMENT — PATIENT HEALTH QUESTIONNAIRE - PHQ9: SUM OF ALL RESPONSES TO PHQ QUESTIONS 1-9: 14

## 2020-09-30 ASSESSMENT — ANXIETY QUESTIONNAIRES: GAD7 TOTAL SCORE: 4

## 2020-10-01 NOTE — PROGRESS NOTES
"Erwin Tolliver is a 85 year old female who is being evaluated via a billable video visit.      The patient has been notified of following:     \"This video visit will be conducted via a call between you and your physician/provider. We have found that certain health care needs can be provided without the need for an in-person physical exam.  This service lets us provide the care you need with a video conversation.  If a prescription is necessary we can send it directly to your pharmacy.  If lab work is needed we can place an order for that and you can then stop by our lab to have the test done at a later time.    Video visits are billed at different rates depending on your insurance coverage.  Please reach out to your insurance provider with any questions.    If during the course of the call the physician/provider feels a video visit is not appropriate, you will not be charged for this service.\"    Patient has given verbal consent for Video visit? Yes  How would you like to obtain your AVS? MyChart  If you are dropped from the video visit, the video invite should be resent to: Text to cell phone: see Epic  Will anyone else be joining your video visit? No      Telemedicine Visit: The patient's condition can be safely assessed and treated via synchronous audio and visual telemedicine encounter.      Reason for Telemedicine Visit: Covid-19 Pandemic    Originating Site (Patient Location): Patient's home    Distant Site (Provider Location): Provider Remote Setting    Consent:  The patient/guardian has verbally consented to: the potential risks and benefits of telemedicine (video visit) versus in person care; bill my insurance or make self-payment for services provided; and responsibility for payment of non-covered services.     Mode of Communication:  Video Conference via [employer]    As the provider I attest to compliance with applicable laws and regulations related to telemedicine.                                  " "                           Outpatient Psychiatric Evaluation- Standard  Adult    Name:  Erwin Tolliver  : 1935    Source of Referral:  Primary Care Provider: Jacqueline Brandt MD   Current Psychotherapist: None     Identifying Data:  Patient is a 85 year old,   White American female  who presents for initial visit with me.  Patient is currently retired. Patient attended the phone/viseo session alone. Patient prefers to be called: \"Fowler\"    Chief Complaint:  Consult    HPI:  Erwin Tolliver is a 85 year old female with past history including grief and depression who presents today for psychiatric evaluation.     The patient reports today that she does not feel like her depression medications are working very well anymore.  Her depression started to get much worse after her  passed away this past April.  They were  for 60 years.  She reports it was a good marriage.  She has had episodes of depression prior to this current episode.  She last had therapy about 15 years ago and did say it was helpful.  She currently feels like she just \"exists.\"  And that she feels \"like a fish out of water.\"  She has had low energy and poor motivation.  Her mood has been depressed.  She is not going to the cabin with her children anymore.  She has been isolating more.  She takes Effexor and Wellbutrin but states they have not been very helpful.  She worries about dying.  Her parents and her siblings are .  She has 4 adult children and many grandchildren who are good support for her.  She lives with her grandson who helps her out with many things around the house.  He is good company.  In her free time she enjoys working on puzzles, playing cards, watching a couple soap operas on TV.  She did report having a little more energy a couple months ago and she had started to paint her kitchen.  Her kids did not want her painting her kitchen due to the work and so she stopped.  Denies thoughts " of suicide.      [facility], during today's team-based visit:  The reason for seeking services at this time is: increased depression  The problem(s) began after   in 2020. Patient has not attempted to resolve these concerns in the past. Meds for depression for many years.    Current Meds:  Wellbutrin   Effexor-  Trazodone 100    Past diagnoses include: Depression  Current medications include: has a current medication list which includes the following prescription(s): acetaminophen, bupropion, ipratropium, levothyroxine, metoprolol succinate er, multiple vitamins-minerals, nitroglycerin, omeprazole, pseudoephedrine-ibuprofen, simvastatin, sumatriptan, trazodone, and venlafaxine.   Medication side effects: Denies  Current stressors include: Symptoms  Coping mechanisms and supports include: Family and Hobbies    Psychiatric Review of Symptoms Per [facility], during today's team-based visit:  Depression:     Change in sleep, Lack of interest, Excessive or inappropriate guilt, Change in energy level, Feeling sad, down, or depressed, Withdrawn and Frequent crying  Megan:             No Symptoms  Psychosis:       No Symptoms  Anxiety:           Excessive worry and Irritability  Panic:              Palpitations and Shortness of breath  Post Traumatic Stress Disorder:  No Symptoms   Eating Disorder:          No Symptoms  ADD / ADHD:              No symptoms  Conduct Disorder:       No symptoms  Autism Spectrum Disorder:     No symptoms  Obsessive Compulsive Disorder:       No Symptoms     Sleep: has changed since      All other ROS negative.     PHQ 10/12/2018 10/25/2019 2020   PHQ-9 Total Score 0 3 14   Q9: Thoughts of better off dead/self-harm past 2 weeks Not at all Not at all Not at all     MELISSA-7 SCORE 2017   Total Score - - -   Total Score 0 (minimal anxiety) - 4 (minimal anxiety)   Total Score - 5 4      Medical Review of Systems:  10 systems (general, cardiovascular, respiratory, eyes, ENT, endocrine, GI, , M/S, neurological) were reviewed. Most pertinent finding(s) is/are: Intermittent headaches. The remaining systems are all unremarkable.    A 12-item WHODAS 2.0 assessment was not completed.    Psychiatric History:   Hospitalizations: None  History of Commitment? No   Past Treatment: counseling and medication(s) from physician / PCP  Suicide Attempts: No   Current Suicide Risk: Suicide Assessment Completed Today.  Self-injurious Behavior: Denies  Electroconvulsive Therapy (ECT) or Transcranial Magnetic Stimulation (TMS): No   GeneSight Genetic Testing: No     Past medication trials include but are not limited to:   Prozac  Zoloft    Effexor-XR - current  Wellbutrin XL - curent    Substance Use History:  Current Use of Drugs/Alcohol: Denies any problematic use, no drugs  Past Use of Drugs/Alcohol: No problematic use  Patient reports no problems as a result of their drinking / drug use.   Patient has not received chemical dependency treatment in the past  Recovery Programming Involvement: Not Applicable    Tobacco use: History quit in 1966    Based on the clinical interview, there  are not indications of drug or alcohol abuse. Continue to monitor.   Discussed effect of substance use on overall health.     Past Medical History:  Past Medical History:   Diagnosis Date     Arthritis      Depressive disorder      Depressive disorder, not elsewhere classified      Hepatitis, unspecified 1993    From [facility]     Hypertension      Migraine with aura, without mention of intractable migraine without mention of status migrainosus      Need for prophylactic hormone replacement therapy (postmenopausal)      Palpitations      SVT (supraventricular tachycardia) (H) 10/8/2012     Thyroid disease      Unspecified essential hypertension       Surgery:   Past Surgical History:   Procedure Laterality Date     APPENDECTOMY        BACK SURGERY      bulged disc     C TOTAL ABDOM HYSTERECTOMY      Hysterectomy, Total Abdominal and BSO -benign - age 41     COLONOSCOPY       ENT SURGERY       EYE SURGERY       HC COLONOSCOPY W BIOPSY  11/07/06     HC KNEE SCOPE, DIAGNOSTIC      Arthroscopy, Knee     HC REDUCTION OF LARGE BREAST       ORTHOPEDIC SURGERY       SEPTOPLASTY N/A 6/20/2017    Procedure: SEPTOPLASTY;  Septoplasty, Submucosal resection of the turbinates;  Surgeon: Edwin Cox MD;  Location: [facility] OR     SHOULDER SURGERY  01/16/2019     Food and Medicine Allergies:     Allergies   Allergen Reactions     Aspirin      headaches,tremors,nausea     Augmentin [Amoxicillin-Pot Clavulanate] Nausea and Vomiting     Seizures or Head Injury: No  Diet: No Restrictions  Exercise: No regular exercise program  Supplements: Reviewed per Electronic Medical Record Today    Current Medications:    Current Outpatient Medications:      ACETAMINOPHEN PO, , Disp: , Rfl:      buPROPion (WELLBUTRIN XL) 300 MG 24 hr tablet, Take 1 tablet (300 mg) by mouth every morning, Disp: 90 tablet, Rfl: 3     ipratropium (ATROVENT) 0.03 % spray, Spray 2 sprays into both nostrils 3 times daily, Disp: 1 Box, Rfl: 3     levothyroxine (SYNTHROID/LEVOTHROID) 88 MCG tablet, Take 1 tablet (88 mcg) by mouth daily, Disp: 90 tablet, Rfl: 3     metoprolol succinate ER (TOPROL-XL) 100 MG 24 hr tablet, Take 1 tablet (100 mg) by mouth daily, Disp: 90 tablet, Rfl: 3     Multiple Vitamins-Minerals (PRESERVISION AREDS PO), Take 1 tablet by mouth daily, Disp: , Rfl:      nitroGLYcerin (NITROSTAT) 0.4 MG sublingual tablet, Place 1 tablet (0.4 mg) under the tongue once for 1 dose For chest pain place 1 tablet under the tongue every 5 minutes for 3 doses. If symptoms persist 5 minutes after 1st dose call 911., Disp: 1 tablet, Rfl: 0     omeprazole (PRILOSEC) 20 MG DR capsule, Take 1 capsule (20 mg) by mouth every other day, Disp: 45 capsule, Rfl: 3     Pseudoephedrine-Ibuprofen (ADVIL  COLD/SINUS PO), , Disp: , Rfl:      simvastatin (ZOCOR) 20 MG tablet, TAKE ONE TABLET BY MOUTH AT BEDTIME, Disp: 90 tablet, Rfl: 3     SUMAtriptan (IMITREX) 25 MG tablet, Take 1-2 tablets (25-50 mg) by mouth at onset of headache for migraine May repeat in 2 hours. Max 8 tablets/24 hours., Disp: 9 tablet, Rfl: 1     traZODone (DESYREL) 100 MG tablet, Take 1 tablet (100 mg) by mouth At Bedtime, Disp: 90 tablet, Rfl: 3     venlafaxine (EFFEXOR-XR) 150 MG 24 hr capsule, TAKE 1 CAPSULE BY MOUTH DAILY, Disp: 90 capsule, Rfl: 3    Vital Signs:  None since this is a phone/video visit.     Labs:  Most recent laboratory results reviewed and the pertinent results include:   Office Visit on 09/22/2020   Component Date Value Ref Range Status     Cholesterol 09/22/2020 173  <200 mg/dL Final     Triglycerides 09/22/2020 159* <150 mg/dL Final    Comment: Borderline high:  150-199 mg/dl  High:             200-499 mg/dl  Very high:       >499 mg/dl       HDL Cholesterol 09/22/2020 58  >49 mg/dL Final     LDL Cholesterol Calculated 09/22/2020 83  <100 mg/dL Final    Desirable:       <100 mg/dl     Non HDL Cholesterol 09/22/2020 115  <130 mg/dL Final     Sodium 09/22/2020 138  133 - 144 mmol/L Final     Potassium 09/22/2020 4.0  3.4 - 5.3 mmol/L Final     Chloride 09/22/2020 105  94 - 109 mmol/L Final     Carbon Dioxide 09/22/2020 30  20 - 32 mmol/L Final     Anion Gap 09/22/2020 3  3 - 14 mmol/L Final     Glucose 09/22/2020 92  70 - 99 mg/dL Final     Urea Nitrogen 09/22/2020 15  7 - 30 mg/dL Final     Creatinine 09/22/2020 0.84  0.52 - 1.04 mg/dL Final     GFR Estimate 09/22/2020 63  >60 mL/min/[1.73_m2] Final    Comment: Non  GFR Calc  Starting 12/18/2018, serum creatinine based estimated GFR (eGFR) will be   calculated using the Chronic Kidney Disease Epidemiology Collaboration   (CKD-EPI) equation.       GFR Estimate If Black 09/22/2020 73  >60 mL/min/[1.73_m2] Final    Comment:  GFR Calc  Starting  12/18/2018, serum creatinine based estimated GFR (eGFR) will be   calculated using the Chronic Kidney Disease Epidemiology Collaboration   (CKD-EPI) equation.       Calcium 09/22/2020 8.8  8.5 - 10.1 mg/dL Final     Bilirubin Total 09/22/2020 0.7  0.2 - 1.3 mg/dL Final     Albumin 09/22/2020 3.9  3.4 - 5.0 g/dL Final     Protein Total 09/22/2020 7.5  6.8 - 8.8 g/dL Final     Alkaline Phosphatase 09/22/2020 69  40 - 150 U/L Final     ALT 09/22/2020 27  0 - 50 U/L Final     AST 09/22/2020 19  0 - 45 U/L Final     TSH 09/22/2020 3.38  0.40 - 4.00 mU/L Final     Creatinine Urine 09/22/2020 48  mg/dL Final     Albumin Urine mg/L 09/22/2020 20  mg/L Final     Albumin Urine mg/g Cr 09/22/2020 41.08* 0 - 25 mg/g Cr Final   Orders Only on 09/14/2020   Component Date Value Ref Range Status     COVID-19 Virus PCR to [facility] - So* 09/14/2020 Nasopharyngeal   Final     COVID-19 Virus PCR to [facility] - Re* 09/14/2020 Not Detected   Final    Comment: Collection of multiple specimens from the same patient may be necessary to   detect the virus. The possibility of a false negative should be considered if   the patient's recent exposure or clinical presentation suggests 2019 nCOV   infection and diagnostic tests for other causes of illness are negative.   Repeat testing may be considered in this setting.  Patient sample was heat inactivated and amplified using the HDPCR SARS-CoV-2   assay ([employer] The HDPCRTM SARS-CoV-2 assay is a reverse   transcription real-time polymerase chain reaction (qRT-PCR) test intended for   the qualitative detection of nucleic acid  from SARS-CoV-2 in human nasopharyngeal swabs, oropharyngeal swabs, anterior   nasal swabs, mid-turbinate nasal swabs as well as nasal aspirate, nasal wash,   and bronchoalveolar lavage (BAL) specimens from individuals who are suspected   of COVID-19 by their healthcare provider.  A negative result does not rule out the presence of real-time PCR inhibitors   in the sp                            ecimen or COVID-19 RNA in concentrations below the limit of detection   of the assay. The possibility of a false negative should be considered if the   patients recent exposure or clinical presentation suggests COVID-19.   Additional testing or repeat testing requires consultation with the   laboratory.  Nasopharyngeal specimen is the preferred choice for swab-based SARS CoV2   testing. When collection of a nasopharyngeal swab is not possible the   following are acceptable alternatives:  an oropharyngeal (OP) specimen collected by a healthcare professional, or a   nasal mid-turbinate (NMT) swab collected by a healthcare professional or by   onsite self-collection (using a flocked tapered swab), or an anterior nares   specimen collected by a healthcare professional or by onsite self-collection   (using a round foam swab). (Centers for Disease Control)  Testing performed by [facility] ([facility]) [address] [facility] [place] [address]  The test performance characteristics were determined by [facility]. It has not been   cleared or approved by the FDA.  The laboratory is regulated under the Clinical Laboratory Improvement   Amendments of 1988 (CLIA-88) as qualified to perform high-complexity testing.   This test is used for clinical purposes. It should not be regarded as   investigational or for research.     Office Visit on 10/25/2019   Component Date Value Ref Range Status     Cholesterol 10/25/2019 172  <200 mg/dL Final     Triglycerides 10/25/2019 161* <150 mg/dL Final    Comment: Borderline high:  150-199 mg/dl  High:             200-499 mg/dl  Very high:       >499 mg/dl       HDL Cholesterol 10/25/2019 65  >49 mg/dL Final     LDL Cholesterol Calculated 10/25/2019 75  <100 mg/dL Final    Desirable:       <100 mg/dl     Non HDL Cholesterol 10/25/2019 107  <130 mg/dL Final     TSH 10/25/2019 2.60  0.40  - 4.00 mU/L Final     Sodium 10/25/2019 141  133 - 144 mmol/L Final     Potassium 10/25/2019 4.0  3.4 - 5.3 mmol/L Final     Chloride 10/25/2019 107  94 - 109 mmol/L Final     Carbon Dioxide 10/25/2019 27  20 - 32 mmol/L Final     Anion Gap 10/25/2019 7  3 - 14 mmol/L Final     Glucose 10/25/2019 103* 70 - 99 mg/dL Final     Urea Nitrogen 10/25/2019 16  7 - 30 mg/dL Final     Creatinine 10/25/2019 0.99  0.52 - 1.04 mg/dL Final     GFR Estimate 10/25/2019 52* >60 mL/min/[1.73_m2] Final    Comment: Non  GFR Calc  Starting 2018, serum creatinine based estimated GFR (eGFR) will be   calculated using the Chronic Kidney Disease Epidemiology Collaboration   (CKD-EPI) equation.       GFR Estimate If Black 10/25/2019 61  >60 mL/min/[1.73_m2] Final    Comment:  GFR Calc  Starting 2018, serum creatinine based estimated GFR (eGFR) will be   calculated using the Chronic Kidney Disease Epidemiology Collaboration   (CKD-EPI) equation.       Calcium 10/25/2019 8.8  8.5 - 10.1 mg/dL Final     Bilirubin Total 10/25/2019 0.5  0.2 - 1.3 mg/dL Final     Albumin 10/25/2019 4.0  3.4 - 5.0 g/dL Final     Protein Total 10/25/2019 7.3  6.8 - 8.8 g/dL Final     Alkaline Phosphatase 10/25/2019 69  40 - 150 U/L Final     ALT 10/25/2019 21  0 - 50 U/L Final     AST 10/25/2019 13  0 - 45 U/L Final     Creatinine Urine 10/25/2019 106  mg/dL Final     Albumin Urine mg/L 10/25/2019 37  mg/L Final     Albumin Urine mg/g Cr 10/25/2019 35.09* 0 - 25 mg/g Cr Final     Most recent EKG from 2017 reviewed. QTc interval 398. Attempted to open EKG from 2018 but froze the Epic system on 3 occasions so 2017 one viewed.       Family History:   Patient reported family history includes:   Family History   Problem Relation Age of Onset     Alzheimer Disease Mother      Arthritis Sister          at 72 years     Lipids Sister      Osteoporosis Sister         osteoporosis     Genetic Disorder Sister          down syndrome     Alzheimer Disease Sister      Thyroid Disease Sister      Cardiovascular Brother          at 72 years of CHF     Mental Illness History: Denies  Substance Abuse History: Denies  Suicide History: Denies  Medications: Unknown     Social History Per [facility], Madelin Fox [facility], during today's team-based visit:   Patient reported they grew up in [place].  They were raised by biological parents. Patient reported that  her childhood was good, loving.  Patient denies experiencing childhood abuse/neglect. Patient described their current relationships with family of origin, parents and siblings .     The patient has been  x1 times and has 4 children.     2020.    they had a good relationship. Patient reported having no good friends.      Cultural influences and impact on patient's life structure, values, norms, and healthcare: none identified. Patient identified their preferred language to be English. Patient reported they does not need the assistance of an  or other support involved in treatment.        Patient reported   highest education level was high school graduate. The patient did not serve in the . Patient is currently retired.    Firearms/Weapons Access: No: Patient denies   Service: No    Legal History:  No: Patient denies any legal history    Significant Losses / Trauma / Abuse / Neglect Issues:  There are indications or report of significant loss, trauma, abuse or neglect issues related to: death of  of 60 years in 2020.   Issues of possible neglect are not present.     Comprehensive Examination (limited due to virtual visit format, phone/video):  Vital Signs:  Vitals: There were no vitals taken for this visit.  General/Constitutional:  Appearance: awake, alert, well groomed, appeared stated age and no apparent distress  Attitude:  Cooperative, very pleasant   Eye Contact:  good  Musculoskeletal:  Muscle Strength  and Tone: no gross abnormalities by observation  Psychomotor Behavior:  no evidence of tardive dyskinesia, dystonia, or tics but I was really only able to see her face the way the camera was set-up for her by her grandson  Gait and Station: pt appeared to be sitting in a chair, gait not observed  Psychiatric:  Speech:  clear, coherent, regular rate, rhythm, and volume  Associations:  no loose associations  Thought Process:  logical, linear and goal oriented  Thought Content:  no evidence of suicidal ideation or homicidal ideation, no evidence of psychotic thought, no auditory hallucinations present and no visual hallucinations present  Mood:  sad  and depressed  Affect:  mood congruent and subdued/tearful  Insight:  good  Judgment:  intact, adequate for safety  Impulse Control:  intact  Neurological:  Oriented to:  person, place, time, and situation  Attention Span and Concentration:  normal  Language: intact  Recent and Remote Memory:  Intact to interview. Not formally assessed. No amnesia.  Fund of Knowledge: appropriate    Strengths and Opportunities:   Erwin Tolliver identified the following strengths or resources that may help she succeed in counseling: friends / good social support, family support, insight, intelligence and motivation. Things that may interfere with the patient's success include:  none noted at this time.    There are no language or communication issues or need for modification in treatment.   There are no ethnic, cultural or Adventist factors that may be relevant for therapy.  Client identified their preferred language to be English.  Client does not need the assistance of an  or other support involved in therapy.    Suicide Risk Assessment:  Today Erwin Tolliver reports no suicidal ideation. Based on all available evidence including the factors cited above, Erwin Tollivre does not appear to be at imminent risk for self-harm, does not meet criteria for a 72-hr hold, and  therefore remains appropriate for ongoing outpatient level of care.  A thorough assessment of risk factors related to suicide and self-harm have been reviewed and are noted above. The patient convincingly denies acute suicidality on several occasions. Local community safety resources reviewed and printed for patient to use if needed. There was no deceit detected, and the patient presented in a manner that was believable.     DSM5  Diagnosis:  296.33 (F33.2) Major Depressive Disorder, Recurrent Episode, Severe _Without psychotic features    Medical Comorbidities Include:   Patient Active Problem List    Diagnosis Date Noted     Other chronic sinusitis 03/01/2017     Priority: Medium     Penicillin allergy 03/01/2017     Priority: Medium     Hypertension, goal below 150/90 05/05/2015     Priority: Medium     Macular degeneration (senile) of retina 04/04/2014     Priority: Medium     Advanced directives, counseling/discussion 10/01/2013     Priority: Medium     Moderate recurrent major depression (H) 10/01/2013     Priority: Medium     CKD (chronic kidney disease) stage 3, GFR 30-59 ml/min (H) 08/15/2012     Priority: Medium     Atopic rhinitis 01/09/2012     Priority: Medium     GERD (gastroesophageal reflux disease) 08/02/2011     Priority: Medium     Osteopenia 11/15/2010     Priority: Medium     Hyperlipidemia, unspecified 10/31/2010     Priority: Medium     Hypothyroidism 12/14/2006     Priority: Medium     Migraine with aura      Priority: Medium       Impression:  Erwin Tolliver is an 85-year-old female with past psychiatric history including depression and grief who presents today for psychiatric evaluation.  She has been treated for many years with depression medication but her symptoms started to become much worse after her  of 60 years passed away this past April.  Unfortunately her grief has spiraled into a depression.  She reported her most distressing symptoms as the low mood and poor energy  and motivation.  Before I really would mess much with her current antidepressants (since they had been quite effective for some time) I would like to change her Wellbutrin formulation to twice daily dosing of sustained release.  The anxiety she suffers seems to come from the depression and not necessarily an independent anxiety disorder.  We discussed possibility of sustained release Wellbutrin to worsen anxiety and irritability.  She will monitor her symptoms.  Also discussed decreased seizure threshold while on Wellbutrin.  She tolerated the extended release formulation well.  Could consider adding Abilify as she has not trialed this augmentation strategy.  She has already failed 2 other SSRI medications.  I also very strongly encouraged/recommended individual psychotherapy to support her in the grief process after having lost her .  She had a good marriage and he was her best friend.  I also encouraged her to try to employ some behavioral activation techniques.  I encouraged her to get out with [place] at the [place] if she felt she could do so safely with COVID going on.  I also encouraged her to try to sit outside in the sun and on nice days for some fresh air.    Pt denies negative side effects from meds. No lightheadedness or know hypotension from trazodone. QTc in 2017 not prolonged. Could not access 2018 EKG, kept freezing system x 3 attempts.     Medication side effects and alternatives reviewed. Health promotion activities recommended and reviewed today. All questions addressed. Education and counseling completed regarding risks and benefits of medications and psychotherapy options. Recommend therapy for additional support.     Treatment Plan:    Continue Effexor- mg daily for mood     Continue trazodone 100 mg at bedtime for sleep    Change Wellbutrin XL to Wellbutrin SR (sustained release) 150 mg 2 times daily.  Start with 150 mg daily for 3 to 5 days and increase to twice daily dosing as  tolerated.  Try not to take second dose much later than 2 PM as to not interfere with sleep.     I strongly recommend individual psychotherapy to help support you with your grief.    Try to get outside and get some fresh air when able.    Continue all other medications as reviewed per electronic medical record today.     Safety plan reviewed. To the Emergency Department as needed or call after hours crisis line at [phone] or [phone]. [place] Crisis Text [place]: Text [place] to 367272  or  Suicide LifeLine Chat: [email or web address].org/chat    Schedule an appointment with me in 8 weeks or sooner as needed.  Call [facility] at [phone] to schedule.  I will work on trying to get a follow-up appointment slot opened up for you sooner than the 8 weeks.    Follow up with primary care provider as planned or sooner if needed for acute medical concerns.    Call the psychiatric nurse line with medication questions or concerns at [phone].    [employer] may be used to communicate with your provider, but this is not intended to be used for emergencies.    Community Resources:    National Suicide Prevention Lifeline: 893.232.2241 (TTY: 127.942.1974). Call anytime for help.  (www.suicidepreventionlifeline.org)  National [place] on Mental Illness (www.neno.org): 781.368.8753 or 132-805-1578.   Mental Health Association (www.mentalhealth.org): 313.107.1298 or 071-016-6319.  [place] Crisis Text [place]: Text [place] to 920338  Suicide LifeLine Chat: [email or web address].org/chat    Administrative Billing:   Phone Call/Video Duration: 27 Minutes  Start: 3:22p  Stop: 3:49p      Patient Status:  Patient will continue to be seen for ongoing consultation and stabilization.    Signed:   Jasmyne Johnston DO  [facility]

## 2020-10-01 NOTE — PATIENT INSTRUCTIONS
Treatment Plan:    Continue Effexor- mg daily for mood     Continue trazodone 100 mg at bedtime for sleep    Change Wellbutrin XL to Wellbutrin SR (sustained release) 150 mg 2 times daily.  Start with 150 mg daily for 3 to 5 days and increase to twice daily dosing as tolerated.  Try not to take second dose much later than 2 PM as to not interfere with sleep.     I strongly recommend individual psychotherapy to help support you with your grief.    Try to get outside and get some fresh air when able.    Continue all other medications as reviewed per electronic medical record today.     Safety plan reviewed. To the Emergency Department as needed or call after hours crisis line at [phone] or [phone]. [place] Crisis [place]: Text [place] to 552217  or  Suicide LifeLine Chat: suicideHubei Kento Electronic.org/chat    Schedule an appointment with me in 8 weeks or sooner as needed.  Call [facility] at [phone] to schedule.  I will work on trying to get a follow-up appointment slot opened up for you sooner than the 8 weeks.    Follow up with primary care provider as planned or sooner if needed for acute medical concerns.    Call the psychiatric nurse line with medication questions or concerns at [phone].    [employer] may be used to communicate with your provider, but this is not intended to be used for emergencies.    Community Resources:    National Suicide Prevention Lifeline: 182.324.1080 (TTY: 962.565.2522). Call anytime for help.  (www.suicidepreventionlifeline.org)  National [place] on Mental Illness (www.neno.org): 267.238.1096 or 327-631-0411.   Mental Health Association (www.mentalhealth.org): 955.625.6113 or 779-189-2449.  [place] Crisis [place]: Text [place] to 128390  Suicide LifeLine Chat: suicideHubei Kento Electronic.org/chat

## 2020-10-29 ENCOUNTER — VIRTUAL VISIT (OUTPATIENT)
Dept: PSYCHIATRY | Facility: OTHER | Age: 85
End: 2020-10-29
Payer: MEDICARE

## 2020-10-29 DIAGNOSIS — F33.41 RECURRENT MAJOR DEPRESSIVE DISORDER, IN PARTIAL REMISSION (H): Primary | ICD-10-CM

## 2020-10-29 DIAGNOSIS — F43.21 GRIEF: ICD-10-CM

## 2020-10-29 PROCEDURE — 99213 OFFICE O/P EST LOW 20 MIN: CPT | Mod: 95 | Performed by: PSYCHIATRY & NEUROLOGY

## 2020-10-29 RX ORDER — BUPROPION HYDROCHLORIDE 150 MG/1
150 TABLET, EXTENDED RELEASE ORAL 2 TIMES DAILY
Qty: 60 TABLET | Refills: 1 | Status: SHIPPED | OUTPATIENT
Start: 2020-10-29 | End: 2021-11-26

## 2020-10-29 NOTE — PROGRESS NOTES
"Erwin Tolliver is a 85 year old female who is being evaluated via a billable telephone visit.      The patient has been notified of following:     \"This telephone visit will be conducted via a call between you and your physician/provider. We have found that certain health care needs can be provided without the need for a physical exam.  This service lets us provide the care you need with a short phone conversation.  If a prescription is necessary we can send it directly to your pharmacy.  If lab work is needed we can place an order for that and you can then stop by our lab to have the test done at a later time.    Telephone visits are billed at different rates depending on your insurance coverage. During this emergency period, for some insurers they may be billed the same as an in-person visit.  Please reach out to your insurance provider with any questions.    If during the course of the call the physician/provider feels a telephone visit is not appropriate, you will not be charged for this service.\"    Patient has given verbal consent for Telephone visit?  Yes    What phone number would you like to be contacted at? See Epic    How would you like to obtain your AVS? [facility]        Outpatient Psychiatric Progress Note    Name: Erwin Tolliver   : 1935                    Primary Care Provider: Jacqueline Brandt MD   Therapist: None; referral placed today    PHQ-9 scores:  PHQ-9 SCORE 2019 10/25/2019 2020   PHQ-9 Total Score - - -   PHQ-9 Total Score [facility] Incomplete - 14 (Moderate depression)   PHQ-9 Total Score - 3 14       MELISSA-7 scores:  MELISSA-7 SCORE 2017   Total Score - - -   Total Score 0 (minimal anxiety) - 4 (minimal anxiety)   Total Score - 5 4       Patient Identification:  Patient is a 85 year old,   White American female  who presents for return visit with me.  Patient is currently retired. Patient attended the phone/video session alone. Patient " "prefers to be called: \"[place]\".    Interim History:  I last saw Erwin Tolliver for outpatient psychiatry Consultation on 09/29/2020. During that appointment, we:     Continue Effexor- mg daily for mood     Continue trazodone 100 mg at bedtime for sleep    Change Wellbutrin XL to Wellbutrin SR (sustained release) 150 mg 2 times daily.  Start with 150 mg daily for 3 to 5 days and increase to twice daily dosing as tolerated.  Try not to take second dose much later than 2 PM as to not interfere with sleep.     I strongly recommend individual psychotherapy to help support you with your grief.    Try to get outside and get some fresh air when able.    Today, pt reports having more ambition and motivation. Cleaned cupboards. Been in a pretty good mood lately. Sleeping ok. Goes to bed late.  Appetite \"is fine.\" Used to take a laxative everyday and no longer has to take one everyday. Lower back pain has been tough. Has been going out shopping.  She reports that the only thing that has \"been bothering me lately\" is that she feels guilty over not feeling bad or sad enough when her  passed away.  The guilt has been very bothersome.  She would be willing to talk to a therapist about her feelings because she feels as though she does not want to burden her children with how she feels over their father's passing.  She denies any thoughts of suicide or safety concerns.  No drug or alcohol use.    Psychiatric ROS:  Erwin Tolliver reports mood has been: Improved  Anxiety has been: Improved  Sleep has been: Stable/good  Megan sxs: None  Psychosis sxs: None  ADHD/ADD sxs: None  PTSD sxs: None  PHQ9 and GAD7 scores were reviewed today.   Medication side effects: Denies  Current stressors include: Grief/Loss  Coping mechanisms and supports include: Family and Hobbies    Current medications include:   Current Outpatient Medications   Medication Sig     ACETAMINOPHEN PO      buPROPion (WELLBUTRIN SR) 150 MG 12 hr tablet " Take 1 tablet (150 mg) by mouth 2 times daily     ipratropium (ATROVENT) 0.03 % spray Spray 2 sprays into both nostrils 3 times daily     levothyroxine (SYNTHROID/LEVOTHROID) 88 MCG tablet Take 1 tablet (88 mcg) by mouth daily     metoprolol succinate ER (TOPROL-XL) 100 MG 24 hr tablet Take 1 tablet (100 mg) by mouth daily     Multiple Vitamins-Minerals (PRESERVISION AREDS PO) Take 1 tablet by mouth daily     nitroGLYcerin (NITROSTAT) 0.4 MG sublingual tablet Place 1 tablet (0.4 mg) under the tongue once for 1 dose For chest pain place 1 tablet under the tongue every 5 minutes for 3 doses. If symptoms persist 5 minutes after 1st dose call 911.     omeprazole (PRILOSEC) 20 MG DR capsule Take 1 capsule (20 mg) by mouth every other day     Pseudoephedrine-Ibuprofen (ADVIL COLD/SINUS PO)      simvastatin (ZOCOR) 20 MG tablet TAKE ONE TABLET BY MOUTH AT BEDTIME     SUMAtriptan (IMITREX) 25 MG tablet Take 1-2 tablets (25-50 mg) by mouth at onset of headache for migraine May repeat in 2 hours. Max 8 tablets/24 hours.     traZODone (DESYREL) 100 MG tablet Take 1 tablet (100 mg) by mouth At Bedtime     venlafaxine (EFFEXOR-XR) 150 MG 24 hr capsule TAKE 1 CAPSULE BY MOUTH DAILY     No current facility-administered medications for this visit.        Past Medical/Surgical History:  Past Medical History:   Diagnosis Date     Arthritis      Depressive disorder      Depressive disorder, not elsewhere classified      Hepatitis, unspecified 1993    From [facility]      Migraine with aura, without mention of intractable migraine without mention of status migrainosus      Need for prophylactic hormone replacement therapy (postmenopausal)      Palpitations      SVT (supraventricular tachycardia) (H) 10/8/2012     Thyroid disease      Unspecified essential hypertension       has a past medical history of Arthritis, Depressive disorder, Depressive disorder, not elsewhere classified, Hepatitis, unspecified (1993),  Hypertension, Migraine with aura, without mention of intractable migraine without mention of status migrainosus, Need for prophylactic hormone replacement therapy (postmenopausal), Palpitations, SVT (supraventricular tachycardia) (H) (10/8/2012), Thyroid disease, and Unspecified essential hypertension.    Social History:  Reviewed. No changes to social history.     Vital Signs:   None. This is phone/video visit.     Labs:  Most recent laboratory results reviewed and no new labs.     Review of Systems:  10 systems (general, cardiovascular, respiratory, eyes, ENT, endocrine, GI, , M/S, neurological) were reviewed. Most pertinent finding(s) is/are: Chronic lower back pain. The remaining systems are all unremarkable.    Mental Status Examination (limited as this is by phone/video):  Attitude:  cooperative, pleasant  Oriented to:  person, place, time, and situation  Attention Span and Concentration:  normal  Speech:  clear, coherent, regular rate, rhythm, and volume  Language: intact  Mood:  better  Affect:  appropriate and in normal range and mood congruent  Associations:  no loose associations  Thought Process:  logical, linear and goal oriented  Thought Content:  no evidence of suicidal ideation or homicidal ideation, no evidence of psychotic thought, no auditory hallucinations present and no visual hallucinations present  Recent and Remote Memory:  Intact to interview. Not formally assessed. No amnesia.  Fund of Knowledge: appropriate  Insight:  good  Judgment:  intact, adequate for safety  Impulse Control:  intact    Suicide Risk Assessment:  Today Erwin Tolliver reports no suicidal ideation. Based on all available evidence including the factors cited above, Erwin Tolliver does not appear to be at imminent risk for self-harm, does not meet criteria for a 72-hr hold, and therefore remains appropriate for ongoing outpatient level of care.  A thorough assessment of risk factors related to suicide and  self-harm have been reviewed and are noted above. The patient convincingly denies suicidality on several occasions. Local community safety resources printed and reviewed for patient to use if needed. There was no deceit detected, and the patient presented in a manner that was believable.     DSM5 Diagnosis:  296.35 (F33.41)  Major Depressive Disorder, Recurrent Episode, In partial remission _    Medical comorbidities include:   Patient Active Problem List    Diagnosis Date Noted     Other chronic sinusitis 03/01/2017     Priority: Medium     Penicillin allergy 03/01/2017     Priority: Medium     Hypertension, goal below 150/90 05/05/2015     Priority: Medium     Macular degeneration (senile) of retina 04/04/2014     Priority: Medium     Advanced directives, counseling/discussion 10/01/2013     Priority: Medium     Moderate recurrent major depression (H) 10/01/2013     Priority: Medium     CKD (chronic kidney disease) stage 3, GFR 30-59 ml/min 08/15/2012     Priority: Medium     Atopic rhinitis 01/09/2012     Priority: Medium     GERD (gastroesophageal reflux disease) 08/02/2011     Priority: Medium     Osteopenia 11/15/2010     Priority: Medium     Hyperlipidemia, unspecified 10/31/2010     Priority: Medium     Hypothyroidism 12/14/2006     Priority: Medium     Migraine with aura      Priority: Medium       Psychosocial & Contextual Factors:  See HPI    Assessment:  Erwin Tolliver reports overall improvement in her mood, energy, and motivation.  Her depression seems to be in remission.  She is sleeping well and has good appetite.  No safety concerns.  No substance abuse issues.  Wellbutrin sustained release formulation has been helpful with no noted negative side effects.  This will be continued at current dose.  Therapy referral placed today for her to process her feelings of guilt regarding her perception that she did not feel bad enough when her  passed away.  She otherwise is doing quite well.  Care  will now be returned back to referring provider/PCP due to stability.    Medication side effects and alternatives were reviewed. Health promotion activities recommended and reviewed today. All questions addressed. Education and counseling completed regarding risks and benefits of medications and psychotherapy options. Recommend therapy for additional support.     Treatment Plan:    Continue Effexor- mg daily for mood    Continue trazodone 100 mg at bedtime for sleep.  Continue to watch for possibility that this can cause hypotension.  Consider decreasing the dose in the future if sleep remains stable.    Continue Wellbutrin  mg twice daily for mood.    Referral placed today for individual psychotherapy.    Continue all other medications as reviewed per electronic medical record today.     Safety plan reviewed. To the Emergency Department as needed or call after hours crisis line at [phone] or [phone]. [place] Crisis Text [place]. Text [place] to 022839 or Suicide LifeLine Chat: suicidepreventionlifeline.org/chat    Follow up with primary care provider in about 6 weeks or as planned or for acute medical concerns.    [employer] may be used to communicate with your provider, but this is not intended to be used for emergencies.    Administrative Billing:   Phone Call/Video Duration: 9 Minutes  Start: 9:50a  Stop: 10:00a    Time spent with patient was 9 minutes and greater than 50% of time or 5 minutes was spent in counseling and coordination of care regarding above diagnoses and treatment plan.    Patient Status:  The patient is being returned to the referring provider for ongoing care and medication prescribing.  The patient can be referred back to this service for further consultation as needed.    Signed:   Jasmyne Johnston DO  [facility]

## 2020-10-29 NOTE — PATIENT INSTRUCTIONS
Treatment Plan:    Continue Effexor- mg daily for mood    Continue trazodone 100 mg at bedtime for sleep.  Continue to watch for possibility that this can cause hypotension.  Consider decreasing the dose in the future if sleep remains stable.    Continue Wellbutrin  mg twice daily for mood.    Referral placed today for individual psychotherapy.    Continue all other medications as reviewed per electronic medical record today.     Safety plan reviewed. To the Emergency Department as needed or call after hours crisis line at [phone] or [phone]. [place] Crisis Text [place]. Text [place] to 441486 or Suicide LifeLine Chat: suicidepreventionlifeline.org/chat    Follow up with primary care provider in about 6 weeks or as planned or for acute medical concerns.    [employer] may be used to communicate with your provider, but this is not intended to be used for emergencies.    Thank you for our work together in the Psychiatry Collaborative Care Model at [facility]. This is our last visit and I am returning your care back to your Primary Care Provider Jacqueline Brandt MD . If you are not doing well, please contact your Primary Care Provider office.

## 2020-10-29 NOTE — Clinical Note
Psychiatry Update/Consult. I am sending care back to you for ongoing care and medication prescribing.  Future medication refills will come from you (refills supplied today by me). I recommended that the patient follow up with you in about 6 weeks. More details about treatment plan are in my note. If you have any questions or concerns, please let me know. The patient can be re-referred back to this service for further consultation in the future if needed but a new referral will need to be placed.    Thanks for the referral! It was a pleasure working with Erwin Tolliver.     - Jasmyne Johnston, DO  [facility]

## 2020-11-25 DIAGNOSIS — I10 HYPERTENSION, GOAL BELOW 150/90: ICD-10-CM

## 2020-11-25 RX ORDER — METOPROLOL SUCCINATE 100 MG/1
TABLET, EXTENDED RELEASE ORAL
Qty: 90 TABLET | Refills: 3 | Status: SHIPPED | OUTPATIENT
Start: 2020-11-25 | End: 2021-11-26

## 2020-11-25 NOTE — TELEPHONE ENCOUNTER
Prescription approved per [facility] Refill Protocol.  Maria Elena Singh RN  November 25, 2020

## 2020-12-04 ENCOUNTER — MYC MEDICAL ADVICE (OUTPATIENT)
Dept: FAMILY MEDICINE | Facility: OTHER | Age: 85
End: 2020-12-04

## 2020-12-07 ENCOUNTER — MYC MEDICAL ADVICE (OUTPATIENT)
Dept: FAMILY MEDICINE | Facility: OTHER | Age: 85
End: 2020-12-07

## 2020-12-07 NOTE — TELEPHONE ENCOUNTER
Team please call to verify if the following medications were received to [facility] in [place], if not please giver verbal.     Effexor, Levothyroxine, Trazodone, and Simvastatin.     Maria Elena Singh RN  December 7, 2020

## 2020-12-07 NOTE — TELEPHONE ENCOUNTER
Verified with pharmacy all medications have been sent.   Informed patient.   Maria Elena Singh RN  December 7, 2020

## 2020-12-07 NOTE — TELEPHONE ENCOUNTER
Tried calling pharmacy at 9am and got a busy signal. Please try again.   Maria Elena Singh RN  December 7, 2020

## 2021-01-25 ENCOUNTER — MYC MEDICAL ADVICE (OUTPATIENT)
Dept: FAMILY MEDICINE | Facility: OTHER | Age: 86
End: 2021-01-25

## 2021-02-06 ENCOUNTER — MYC MEDICAL ADVICE (OUTPATIENT)
Dept: FAMILY MEDICINE | Facility: OTHER | Age: 86
End: 2021-02-06

## 2021-10-23 ENCOUNTER — HEALTH MAINTENANCE LETTER (OUTPATIENT)
Age: 86
End: 2021-10-23

## 2021-11-26 ENCOUNTER — OFFICE VISIT (OUTPATIENT)
Dept: FAMILY MEDICINE | Facility: OTHER | Age: 86
End: 2021-11-26
Payer: MEDICARE

## 2021-11-26 VITALS
RESPIRATION RATE: 16 BRPM | HEART RATE: 68 BPM | WEIGHT: 136.5 LBS | BODY MASS INDEX: 25.12 KG/M2 | DIASTOLIC BLOOD PRESSURE: 78 MMHG | HEIGHT: 62 IN | TEMPERATURE: 97.9 F | SYSTOLIC BLOOD PRESSURE: 130 MMHG | OXYGEN SATURATION: 96 %

## 2021-11-26 DIAGNOSIS — I10 HYPERTENSION, GOAL BELOW 150/90: ICD-10-CM

## 2021-11-26 DIAGNOSIS — J30.89 SEASONAL ALLERGIC RHINITIS DUE TO OTHER ALLERGIC TRIGGER: ICD-10-CM

## 2021-11-26 DIAGNOSIS — K21.9 GASTROESOPHAGEAL REFLUX DISEASE WITHOUT ESOPHAGITIS: ICD-10-CM

## 2021-11-26 DIAGNOSIS — G43.109 MIGRAINE WITH AURA AND WITHOUT STATUS MIGRAINOSUS, NOT INTRACTABLE: ICD-10-CM

## 2021-11-26 DIAGNOSIS — E03.9 HYPOTHYROIDISM, UNSPECIFIED TYPE: ICD-10-CM

## 2021-11-26 DIAGNOSIS — F33.1 MODERATE RECURRENT MAJOR DEPRESSION (H): ICD-10-CM

## 2021-11-26 DIAGNOSIS — G47.00 INSOMNIA, UNSPECIFIED TYPE: ICD-10-CM

## 2021-11-26 DIAGNOSIS — Z00.00 ENCOUNTER FOR MEDICARE ANNUAL WELLNESS EXAM: Primary | ICD-10-CM

## 2021-11-26 DIAGNOSIS — E78.5 HYPERLIPIDEMIA, UNSPECIFIED HYPERLIPIDEMIA TYPE: ICD-10-CM

## 2021-11-26 LAB
ALBUMIN SERPL-MCNC: 3.9 G/DL (ref 3.4–5)
ALP SERPL-CCNC: 63 U/L (ref 40–150)
ALT SERPL W P-5'-P-CCNC: 30 U/L (ref 0–50)
ANION GAP SERPL CALCULATED.3IONS-SCNC: 2 MMOL/L (ref 3–14)
AST SERPL W P-5'-P-CCNC: 21 U/L (ref 0–45)
BILIRUB SERPL-MCNC: 0.8 MG/DL (ref 0.2–1.3)
BUN SERPL-MCNC: 15 MG/DL (ref 7–30)
CALCIUM SERPL-MCNC: 9 MG/DL (ref 8.5–10.1)
CHLORIDE BLD-SCNC: 105 MMOL/L (ref 94–109)
CHOLEST SERPL-MCNC: 165 MG/DL
CO2 SERPL-SCNC: 32 MMOL/L (ref 20–32)
CREAT SERPL-MCNC: 0.74 MG/DL (ref 0.52–1.04)
CREAT UR-MCNC: 74 MG/DL
FASTING STATUS PATIENT QL REPORTED: YES
GFR SERPL CREATININE-BSD FRML MDRD: 74 ML/MIN/1.73M2
GLUCOSE BLD-MCNC: 112 MG/DL (ref 70–99)
HDLC SERPL-MCNC: 71 MG/DL
HGB BLD-MCNC: 14 G/DL (ref 11.7–15.7)
LDLC SERPL CALC-MCNC: 68 MG/DL
MICROALBUMIN UR-MCNC: 43 MG/L
MICROALBUMIN/CREAT UR: 58.11 MG/G CR (ref 0–25)
NONHDLC SERPL-MCNC: 94 MG/DL
POTASSIUM BLD-SCNC: 4.2 MMOL/L (ref 3.4–5.3)
PROT SERPL-MCNC: 7.8 G/DL (ref 6.8–8.8)
SODIUM SERPL-SCNC: 139 MMOL/L (ref 133–144)
TRIGL SERPL-MCNC: 130 MG/DL
TSH SERPL DL<=0.005 MIU/L-ACNC: 2.26 MU/L (ref 0.4–4)

## 2021-11-26 PROCEDURE — 80053 COMPREHEN METABOLIC PANEL: CPT | Performed by: FAMILY MEDICINE

## 2021-11-26 PROCEDURE — G0439 PPPS, SUBSEQ VISIT: HCPCS | Performed by: FAMILY MEDICINE

## 2021-11-26 PROCEDURE — 82043 UR ALBUMIN QUANTITATIVE: CPT | Performed by: FAMILY MEDICINE

## 2021-11-26 PROCEDURE — 99214 OFFICE O/P EST MOD 30 MIN: CPT | Mod: 25 | Performed by: FAMILY MEDICINE

## 2021-11-26 PROCEDURE — 80061 LIPID PANEL: CPT | Performed by: FAMILY MEDICINE

## 2021-11-26 PROCEDURE — 36415 COLL VENOUS BLD VENIPUNCTURE: CPT | Performed by: FAMILY MEDICINE

## 2021-11-26 PROCEDURE — 85018 HEMOGLOBIN: CPT | Performed by: FAMILY MEDICINE

## 2021-11-26 PROCEDURE — G0008 ADMIN INFLUENZA VIRUS VAC: HCPCS | Performed by: FAMILY MEDICINE

## 2021-11-26 PROCEDURE — 84443 ASSAY THYROID STIM HORMONE: CPT | Performed by: FAMILY MEDICINE

## 2021-11-26 PROCEDURE — 90662 IIV NO PRSV INCREASED AG IM: CPT | Performed by: FAMILY MEDICINE

## 2021-11-26 RX ORDER — TRAZODONE HYDROCHLORIDE 100 MG/1
TABLET ORAL
Qty: 90 TABLET | Refills: 3 | Status: SHIPPED | OUTPATIENT
Start: 2021-11-26 | End: 2022-12-06

## 2021-11-26 RX ORDER — LEVOTHYROXINE SODIUM 88 UG/1
88 TABLET ORAL DAILY
Qty: 90 TABLET | Refills: 3 | Status: SHIPPED | OUTPATIENT
Start: 2021-11-26 | End: 2022-11-28

## 2021-11-26 RX ORDER — SIMVASTATIN 20 MG
20 TABLET ORAL AT BEDTIME
Qty: 90 TABLET | Refills: 3 | Status: SHIPPED | OUTPATIENT
Start: 2021-11-26 | End: 2022-11-28

## 2021-11-26 RX ORDER — IPRATROPIUM BROMIDE 21 UG/1
2 SPRAY, METERED NASAL 3 TIMES DAILY
Qty: 30 ML | Refills: 11 | Status: SHIPPED | OUTPATIENT
Start: 2021-11-26 | End: 2023-09-19

## 2021-11-26 RX ORDER — VENLAFAXINE HYDROCHLORIDE 150 MG/1
CAPSULE, EXTENDED RELEASE ORAL
Qty: 90 CAPSULE | Refills: 3 | Status: SHIPPED | OUTPATIENT
Start: 2021-11-26 | End: 2022-12-06

## 2021-11-26 RX ORDER — SUMATRIPTAN 25 MG/1
25-50 TABLET, FILM COATED ORAL
Qty: 9 TABLET | Refills: 1 | Status: SHIPPED | OUTPATIENT
Start: 2021-11-26 | End: 2022-12-06

## 2021-11-26 RX ORDER — METOPROLOL SUCCINATE 100 MG/1
100 TABLET, EXTENDED RELEASE ORAL DAILY
Qty: 90 TABLET | Refills: 3 | Status: SHIPPED | OUTPATIENT
Start: 2021-11-26 | End: 2022-12-06

## 2021-11-26 ASSESSMENT — ACTIVITIES OF DAILY LIVING (ADL): CURRENT_FUNCTION: NO ASSISTANCE NEEDED

## 2021-11-26 ASSESSMENT — ENCOUNTER SYMPTOMS
BREAST MASS: 0
NERVOUS/ANXIOUS: 0
HEMATURIA: 0
PALPITATIONS: 0
EYE PAIN: 0
DIZZINESS: 1
SHORTNESS OF BREATH: 0
WEAKNESS: 0
HEMATOCHEZIA: 0
SORE THROAT: 0
CONSTIPATION: 1
HEADACHES: 0
PARESTHESIAS: 0
FREQUENCY: 0
JOINT SWELLING: 0
DIARRHEA: 0
DYSURIA: 0
NAUSEA: 0
ARTHRALGIAS: 1
HEARTBURN: 1
CHILLS: 0
ABDOMINAL PAIN: 0
COUGH: 0
MYALGIAS: 1
FEVER: 0

## 2021-11-26 ASSESSMENT — PAIN SCALES - GENERAL: PAINLEVEL: NO PAIN (0)

## 2021-11-26 ASSESSMENT — MIFFLIN-ST. JEOR: SCORE: 1014.41

## 2021-11-26 NOTE — PATIENT INSTRUCTIONS
Patient Education   Personalized Prevention Plan  You are due for the preventive services outlined below.  Your care team is available to assist you in scheduling these services.  If you have already completed any of these items, please share that information with your care team to update in your medical record.  Health Maintenance Due   Topic Date Due     Zoster (Shingles) Vaccine (2 of 3) 12/27/2013     Hemoglobin  04/10/2021     Depression Assessment  06/04/2021     Flu Vaccine (1) 09/01/2021     Basic Metabolic Panel  09/22/2021     Comprehensive Metabolic Panel  09/22/2021     Cholesterol Lab  09/22/2021     Kidney Microalbumin Urine Test  09/22/2021     Thyroid Function Lab  09/22/2021     FALL RISK ASSESSMENT  09/22/2021

## 2021-11-26 NOTE — PROGRESS NOTES
"SUBJECTIVE:   Erwin Tolliver is a 86 year old female who presents for Preventive Visit.    Patient has been advised of split billing requirements and indicates understanding: Yes   Are you in the first 12 months of your Medicare coverage?  No    Healthy Habits:     In general, how would you rate your overall health?  Good    Frequency of exercise:  None    Do you usually eat at least 4 servings of fruit and vegetables a day, include whole grains    & fiber and avoid regularly eating high fat or \"junk\" foods?  Yes    Taking medications regularly:  Yes    Medication side effects:  None    Ability to successfully perform activities of daily living:  No assistance needed    Home Safety:  No safety concerns identified    Hearing Impairment:  Difficulty understanding soft or whispered speech and difficulty understanding speech on the telephone    In the past 6 months, have you been bothered by leaking of urine?  No    In general, how would you rate your overall mental or emotional health?  Good      PHQ-2 Total Score: 1    Additional concerns today:  No    Do you feel safe in your environment? Yes    Have you ever done Advance Care Planning? (For example, a Health Directive, POLST, or a discussion with a medical provider or your loved ones about your wishes): Yes, patient states has an Advance Care Planning document and will bring a copy to the clinic.       Fall risk  Fallen 2 or more times in the past year?: No  Any fall with injury in the past year?: No    Cognitive Screening   1) Repeat 3 items (Leader, Season, Table)    2) Clock draw: NORMAL  3) 3 item recall: Recalls 3 objects  Results: 3 items recalled: COGNITIVE IMPAIRMENT LESS LIKELY    Mini-CogTM Anibal Zamora. Licensed by the author for use in [facility]; reprinted with permission ([email or web address]). All rights reserved.      Do you have sleep apnea, excessive snoring or daytime drowsiness?: no    Reviewed and updated as needed this visit by " clinical staff  Tobacco  Allergies  Meds  Problems  Med Hx  Surg Hx  Fam Hx  Soc Hx         Reviewed and updated as needed this visit by Provider  Tobacco  Allergies  Meds  Problems  Med Hx  Surg Hx  Fam Hx        Social History     Tobacco Use     Smoking status: Former Smoker     Packs/day: 0.50     Years: 10.00     Pack years: 5.00     Quit date: 1966     Years since quittin.9     Smokeless tobacco: Never Used     Tobacco comment: no smokers in household   Substance Use Topics     Alcohol use: No     Alcohol/week: 1.7 standard drinks     Comment: beerx2/ x8per month         Alcohol Use 2021   Prescreen: >3 drinks/day or >7 drinks/week? Yes   Prescreen: >3 drinks/day or >7 drinks/week? -   AUDIT SCORE  3       Current providers sharing in care for this patient include:   Patient Care Team:  Jacqueline Brandt MD as PCP - General  Jacqueline Brandt MD as Assigned PCP  Jasmyne Johnston DO as Assigned Behavioral Health Provider    The following health maintenance items are reviewed in Epic and correct as of today:  Health Maintenance Due   Topic Date Due     ZOSTER IMMUNIZATION (2 of 3) 2013     HEMOGLOBIN  04/10/2021     PHQ-9  2021     INFLUENZA VACCINE (1) 2021     BMP  2021     CMP  2021     LIPID  2021     MICROALBUMIN  2021     TSH W/FREE T4 REFLEX  2021     FALL RISK ASSESSMENT  2021         Review of Systems   Constitutional: Negative for chills and fever.   HENT: Positive for congestion and hearing loss. Negative for ear pain and sore throat.    Eyes: Negative for pain and visual disturbance.   Respiratory: Negative for cough and shortness of breath.    Cardiovascular: Negative for chest pain, palpitations and peripheral edema.   Gastrointestinal: Positive for constipation and heartburn. Negative for abdominal pain, diarrhea, hematochezia and nausea.   Breasts:  Negative for tenderness, breast mass and discharge.  "  Genitourinary: Negative for dysuria, frequency, genital sores, hematuria, pelvic pain, urgency, vaginal bleeding and vaginal discharge.   Musculoskeletal: Positive for arthralgias and myalgias. Negative for joint swelling.   Skin: Negative for rash.   Neurological: Positive for dizziness. Negative for weakness, headaches and paresthesias.   Psychiatric/Behavioral: Negative for mood changes. The patient is not nervous/anxious.        OBJECTIVE:   /78   Pulse 68   Temp 97.9  F (36.6  C) (Temporal)   Resp 16   Ht 1.57 m (5' 1.81\")   Wt 61.9 kg (136 lb 8 oz)   SpO2 96%   Breastfeeding No   BMI 25.12 kg/m   Estimated body mass index is 25.12 kg/m  as calculated from the following:    Height as of this encounter: 1.57 m (5' 1.81\").    Weight as of this encounter: 61.9 kg (136 lb 8 oz).  Physical Exam  Constitutional:       General: She is not in acute distress.     Appearance: She is well-developed.   HENT:      Right Ear: Tympanic membrane and external ear normal.      Left Ear: Tympanic membrane and external ear normal.      Ears:      Comments: Bilateral hearing aids     Nose: Nose normal.      Mouth/Throat:      Pharynx: No oropharyngeal exudate.   Eyes:      General:         Right eye: No discharge.         Left eye: No discharge.      Conjunctiva/sclera: Conjunctivae normal.      Pupils: Pupils are equal, round, and reactive to light.   Neck:      Thyroid: No thyromegaly.      Trachea: No tracheal deviation.   Cardiovascular:      Rate and Rhythm: Normal rate and regular rhythm.      Pulses: Normal pulses.      Heart sounds: Normal heart sounds, S1 normal and S2 normal. No murmur heard.  No S3 or S4 sounds.    Pulmonary:      Effort: Pulmonary effort is normal. No respiratory distress.      Breath sounds: Normal breath sounds. No wheezing or rales.   Abdominal:      General: Bowel sounds are normal.      Palpations: Abdomen is soft. There is no mass.      Tenderness: There is no abdominal tenderness. "   Musculoskeletal:         General: Normal range of motion.      Cervical back: Neck supple.   Lymphadenopathy:      Cervical: No cervical adenopathy.   Skin:     General: Skin is warm and dry.      Findings: No rash.   Neurological:      Mental Status: She is alert and oriented to person, place, and time.   Psychiatric:         Thought Content: Thought content normal.         Judgment: Judgment normal.           ASSESSMENT / PLAN:   (Z00.00) Encounter for Medicare annual wellness exam  (primary encounter diagnosis)    (J30.89) Seasonal allergic rhinitis due to other allergic trigger  Comment: Patient has not used her Atrovent nasal spray for quite a while.  She is noticing more nasal congestion and is willing to restart this.  Plan: ipratropium (ATROVENT) 0.03 % nasal spray          (G43.109) Migraine with aura and without status migrainosus, not intractable  Comment: Patient has infrequent migraines.  She is found Imitrex to be helpful in the past.  Her last prescription was from 4 years ago.  Refill was given.  Plan: SUMAtriptan (IMITREX) 25 MG tablet          (E03.9) Hypothyroidism, unspecified type  Comment: Labs drawn.  Refills given.  Plan: TSH WITH FREE T4 REFLEX, levothyroxine         (SYNTHROID/LEVOTHROID) 88 MCG tablet          (I10) Hypertension, goal below 150/90  Comment: Well-controlled.  Labs were drawn.  Refills given.  Plan: Hemoglobin, COMPREHENSIVE METABOLIC PANEL,         Lipid panel reflex to direct LDL Fasting,         Albumin Random Urine Quantitative with Creat         Ratio, metoprolol succinate ER (TOPROL-XL) 100         MG 24 hr tablet          (E78.5) Hyperlipidemia, unspecified hyperlipidemia type  Comment: Continue on statin.  Labs drawn.  Refills given.  Plan: COMPREHENSIVE METABOLIC PANEL, Lipid panel         reflex to direct LDL Fasting, simvastatin         (ZOCOR) 20 MG tablet          (K21.9) Gastroesophageal reflux disease without esophagitis  Comment: Uses omeprazole only as  "needed.  Refills were given.  Plan: omeprazole (PRILOSEC) 20 MG DR capsule          (G47.00) Insomnia, unspecified type  Comment: Finds trazodone effective.  Refills given.  Plan: traZODone (DESYREL) 100 MG tablet          (F33.1) Moderate recurrent major depression (H)  Comment: Feels her mood is stable.  She has made peace with her 's death.  Currently her grandson is living with her and they help support each other.  She is off the bupropion and is not quite sure why she stopped it.  However with her mood being stable we will keep her off of this and continue only on the venlafaxine.  Plan: venlafaxine (EFFEXOR-XR) 150 MG 24 hr capsule            Patient has been advised of split billing requirements and indicates understanding: Yes  COUNSELING:  Reviewed preventive health counseling, as reflected in patient instructions    Estimated body mass index is 25.12 kg/m  as calculated from the following:    Height as of this encounter: 1.57 m (5' 1.81\").    Weight as of this encounter: 61.9 kg (136 lb 8 oz).      She reports that she quit smoking about 55 years ago. She has a 5.00 pack-year smoking history. She has never used smokeless tobacco.      Appropriate preventive services were discussed with this patient, including applicable screening as appropriate for cardiovascular disease, diabetes, osteopenia/osteoporosis, and glaucoma.  As appropriate for age/gender, discussed screening for colorectal cancer, prostate cancer, breast cancer, and cervical cancer. Checklist reviewing preventive services available has been given to the patient.    Reviewed patients plan of care and provided an AVS. The Basic Care Plan (routine screening as documented in Health Maintenance) for Erwin meets the Care Plan requirement. This Care Plan has been established and reviewed with the Patient.    Counseling Resources:  ATP IV Guidelines  Pooled Cohorts Equation Calculator  Breast Cancer Risk Calculator  Breast Cancer: Medication " to Reduce Risk  FRAX Risk Assessment  ICSI Preventive Guidelines  Dietary Guidelines for Americans, 2010  USDA's MyPlate  ASA Prophylaxis  Lung CA Screening    Jacqueline Brandt MD  [facility]    Identified Health Risks:

## 2021-11-27 ASSESSMENT — PATIENT HEALTH QUESTIONNAIRE - PHQ9: SUM OF ALL RESPONSES TO PHQ QUESTIONS 1-9: 2

## 2021-11-30 ENCOUNTER — TELEPHONE (OUTPATIENT)
Dept: FAMILY MEDICINE | Facility: OTHER | Age: 86
End: 2021-11-30
Payer: MEDICARE

## 2021-11-30 NOTE — TELEPHONE ENCOUNTER
----- Message from SADIQ Ulrich CNP sent at 11/29/2021  6:40 AM CST -----  TC patient, KV covering.   Please let patient know that her thyroid, cholesterol and hemoglobin are stable. Her urine does show some protein, which is up from 1 year ago. Good blood pressure control can help with this. Please find out if patient was fasting for her labs as her glucose was up a bit and given protein in her urine we may want to check an A1C for diabetes.       SADIQ Adams CNP  Questions or concerns please feel free to send me a [employer] message or call me  Phone : [phone]

## 2022-02-08 NOTE — TELEPHONE ENCOUNTER
Left message for patient to return our phone call.  Concepcion Sykes, CMA         Pt continues to have multiple VT runs. , BP 80/50 MAP 60. Pt denies felling lightheaded/dizzy, no fluttering in chest. Dr Mendes called back to notify that Amio bolus not successful. Amio gtt 1 mg/min ordered at this time.     Monitoring closely.

## 2022-02-20 DIAGNOSIS — K21.9 GASTROESOPHAGEAL REFLUX DISEASE WITHOUT ESOPHAGITIS: ICD-10-CM

## 2022-10-09 ENCOUNTER — HEALTH MAINTENANCE LETTER (OUTPATIENT)
Age: 87
End: 2022-10-09

## 2022-11-24 DIAGNOSIS — E78.5 HYPERLIPIDEMIA, UNSPECIFIED HYPERLIPIDEMIA TYPE: ICD-10-CM

## 2022-11-24 DIAGNOSIS — E03.9 HYPOTHYROIDISM, UNSPECIFIED TYPE: ICD-10-CM

## 2022-11-28 RX ORDER — SIMVASTATIN 20 MG
TABLET ORAL
Qty: 90 TABLET | Refills: 0 | Status: SHIPPED | OUTPATIENT
Start: 2022-11-28 | End: 2022-12-06

## 2022-11-28 RX ORDER — LEVOTHYROXINE SODIUM 88 UG/1
TABLET ORAL
Qty: 90 TABLET | Refills: 0 | Status: SHIPPED | OUTPATIENT
Start: 2022-11-28 | End: 2022-12-06

## 2022-12-06 ENCOUNTER — OFFICE VISIT (OUTPATIENT)
Dept: FAMILY MEDICINE | Facility: OTHER | Age: 87
End: 2022-12-06
Payer: MEDICARE

## 2022-12-06 VITALS
HEIGHT: 62 IN | SYSTOLIC BLOOD PRESSURE: 150 MMHG | BODY MASS INDEX: 25.67 KG/M2 | TEMPERATURE: 97.8 F | HEART RATE: 67 BPM | OXYGEN SATURATION: 96 % | DIASTOLIC BLOOD PRESSURE: 80 MMHG | RESPIRATION RATE: 16 BRPM | WEIGHT: 139.5 LBS

## 2022-12-06 DIAGNOSIS — E03.9 HYPOTHYROIDISM, UNSPECIFIED TYPE: ICD-10-CM

## 2022-12-06 DIAGNOSIS — K59.00 CONSTIPATION, UNSPECIFIED CONSTIPATION TYPE: ICD-10-CM

## 2022-12-06 DIAGNOSIS — G43.109 MIGRAINE WITH AURA AND WITHOUT STATUS MIGRAINOSUS, NOT INTRACTABLE: ICD-10-CM

## 2022-12-06 DIAGNOSIS — I10 HYPERTENSION, GOAL BELOW 150/90: ICD-10-CM

## 2022-12-06 DIAGNOSIS — G47.00 INSOMNIA, UNSPECIFIED TYPE: ICD-10-CM

## 2022-12-06 DIAGNOSIS — N18.2 CHRONIC KIDNEY DISEASE, STAGE 2 (MILD): ICD-10-CM

## 2022-12-06 DIAGNOSIS — E78.5 HYPERLIPIDEMIA, UNSPECIFIED HYPERLIPIDEMIA TYPE: ICD-10-CM

## 2022-12-06 DIAGNOSIS — F33.1 MODERATE RECURRENT MAJOR DEPRESSION (H): ICD-10-CM

## 2022-12-06 DIAGNOSIS — K21.9 GASTROESOPHAGEAL REFLUX DISEASE WITHOUT ESOPHAGITIS: ICD-10-CM

## 2022-12-06 DIAGNOSIS — Z00.00 ENCOUNTER FOR MEDICARE ANNUAL WELLNESS EXAM: Primary | ICD-10-CM

## 2022-12-06 LAB
ALBUMIN SERPL-MCNC: 3.8 G/DL (ref 3.4–5)
ALP SERPL-CCNC: 65 U/L (ref 40–150)
ALT SERPL W P-5'-P-CCNC: 21 U/L (ref 0–50)
ANION GAP SERPL CALCULATED.3IONS-SCNC: 4 MMOL/L (ref 3–14)
AST SERPL W P-5'-P-CCNC: 22 U/L (ref 0–45)
BILIRUB SERPL-MCNC: 0.7 MG/DL (ref 0.2–1.3)
BUN SERPL-MCNC: 13 MG/DL (ref 7–30)
CALCIUM SERPL-MCNC: 8.7 MG/DL (ref 8.5–10.1)
CHLORIDE BLD-SCNC: 104 MMOL/L (ref 94–109)
CHOLEST SERPL-MCNC: 178 MG/DL
CO2 SERPL-SCNC: 30 MMOL/L (ref 20–32)
CREAT SERPL-MCNC: 0.85 MG/DL (ref 0.52–1.04)
CREAT UR-MCNC: 49 MG/DL
FASTING STATUS PATIENT QL REPORTED: YES
GFR SERPL CREATININE-BSD FRML MDRD: 66 ML/MIN/1.73M2
GLUCOSE BLD-MCNC: 107 MG/DL (ref 70–99)
HDLC SERPL-MCNC: 70 MG/DL
HGB BLD-MCNC: 13 G/DL (ref 11.7–15.7)
LDLC SERPL CALC-MCNC: 85 MG/DL
MICROALBUMIN UR-MCNC: 30 MG/L
MICROALBUMIN/CREAT UR: 61.22 MG/G CR (ref 0–25)
NONHDLC SERPL-MCNC: 108 MG/DL
POTASSIUM BLD-SCNC: 3.6 MMOL/L (ref 3.4–5.3)
PROT SERPL-MCNC: 7.4 G/DL (ref 6.8–8.8)
SODIUM SERPL-SCNC: 138 MMOL/L (ref 133–144)
TRIGL SERPL-MCNC: 114 MG/DL
TSH SERPL DL<=0.005 MIU/L-ACNC: 2.26 MU/L (ref 0.4–4)

## 2022-12-06 PROCEDURE — G0008 ADMIN INFLUENZA VIRUS VAC: HCPCS | Performed by: FAMILY MEDICINE

## 2022-12-06 PROCEDURE — 85018 HEMOGLOBIN: CPT | Performed by: FAMILY MEDICINE

## 2022-12-06 PROCEDURE — 99214 OFFICE O/P EST MOD 30 MIN: CPT | Mod: 25 | Performed by: FAMILY MEDICINE

## 2022-12-06 PROCEDURE — 82043 UR ALBUMIN QUANTITATIVE: CPT | Performed by: FAMILY MEDICINE

## 2022-12-06 PROCEDURE — G0439 PPPS, SUBSEQ VISIT: HCPCS | Performed by: FAMILY MEDICINE

## 2022-12-06 PROCEDURE — 90662 IIV NO PRSV INCREASED AG IM: CPT | Performed by: FAMILY MEDICINE

## 2022-12-06 PROCEDURE — 36415 COLL VENOUS BLD VENIPUNCTURE: CPT | Performed by: FAMILY MEDICINE

## 2022-12-06 PROCEDURE — 84443 ASSAY THYROID STIM HORMONE: CPT | Performed by: FAMILY MEDICINE

## 2022-12-06 PROCEDURE — 80061 LIPID PANEL: CPT | Performed by: FAMILY MEDICINE

## 2022-12-06 PROCEDURE — 80053 COMPREHEN METABOLIC PANEL: CPT | Performed by: FAMILY MEDICINE

## 2022-12-06 RX ORDER — METOPROLOL SUCCINATE 100 MG/1
100 TABLET, EXTENDED RELEASE ORAL DAILY
Qty: 90 TABLET | Refills: 3 | Status: SHIPPED | OUTPATIENT
Start: 2022-12-06 | End: 2023-12-05

## 2022-12-06 RX ORDER — VENLAFAXINE HYDROCHLORIDE 150 MG/1
CAPSULE, EXTENDED RELEASE ORAL
Qty: 90 CAPSULE | Refills: 3 | Status: SHIPPED | OUTPATIENT
Start: 2022-12-06 | End: 2023-09-19 | Stop reason: DRUGHIGH

## 2022-12-06 RX ORDER — LEVOTHYROXINE SODIUM 88 UG/1
88 TABLET ORAL DAILY
Qty: 90 TABLET | Refills: 3 | Status: SHIPPED | OUTPATIENT
Start: 2022-12-06 | End: 2023-03-06

## 2022-12-06 RX ORDER — TRAZODONE HYDROCHLORIDE 100 MG/1
TABLET ORAL
Qty: 90 TABLET | Refills: 3 | Status: SHIPPED | OUTPATIENT
Start: 2022-12-06 | End: 2023-11-16

## 2022-12-06 RX ORDER — AMOXICILLIN 250 MG
2 CAPSULE ORAL DAILY
COMMUNITY
End: 2022-12-06

## 2022-12-06 RX ORDER — AMOXICILLIN 250 MG
2 CAPSULE ORAL DAILY
Qty: 180 TABLET | Refills: 3 | Status: SHIPPED | OUTPATIENT
Start: 2022-12-06 | End: 2023-12-19

## 2022-12-06 RX ORDER — SIMVASTATIN 20 MG
20 TABLET ORAL AT BEDTIME
Qty: 90 TABLET | Refills: 3 | Status: SHIPPED | OUTPATIENT
Start: 2022-12-06 | End: 2023-12-19

## 2022-12-06 RX ORDER — SUMATRIPTAN 25 MG/1
25-50 TABLET, FILM COATED ORAL
Qty: 9 TABLET | Refills: 3 | Status: SHIPPED | OUTPATIENT
Start: 2022-12-06 | End: 2023-12-19

## 2022-12-06 ASSESSMENT — ENCOUNTER SYMPTOMS
DIZZINESS: 0
MYALGIAS: 1
EYE PAIN: 0
DIARRHEA: 0
NERVOUS/ANXIOUS: 1
ARTHRALGIAS: 1
CONSTIPATION: 1
CHILLS: 0
WEAKNESS: 0
SHORTNESS OF BREATH: 0
PARESTHESIAS: 0
HEARTBURN: 1
FEVER: 0
SORE THROAT: 0
HEMATOCHEZIA: 0
BREAST MASS: 0
PALPITATIONS: 0
ABDOMINAL PAIN: 0
NAUSEA: 0
HEMATURIA: 0
JOINT SWELLING: 0
DYSURIA: 0
FREQUENCY: 0
COUGH: 1
HEADACHES: 0

## 2022-12-06 ASSESSMENT — PAIN SCALES - GENERAL: PAINLEVEL: NO PAIN (0)

## 2022-12-06 ASSESSMENT — PATIENT HEALTH QUESTIONNAIRE - PHQ9
10. IF YOU CHECKED OFF ANY PROBLEMS, HOW DIFFICULT HAVE THESE PROBLEMS MADE IT FOR YOU TO DO YOUR WORK, TAKE CARE OF THINGS AT HOME, OR GET ALONG WITH OTHER PEOPLE: NOT DIFFICULT AT ALL
SUM OF ALL RESPONSES TO PHQ QUESTIONS 1-9: 0
SUM OF ALL RESPONSES TO PHQ QUESTIONS 1-9: 0

## 2022-12-06 ASSESSMENT — ACTIVITIES OF DAILY LIVING (ADL): CURRENT_FUNCTION: NO ASSISTANCE NEEDED

## 2022-12-06 NOTE — PROGRESS NOTES
"SUBJECTIVE:   Erwin is a 87 year old who presents for Preventive Visit.  Patient has been advised of split billing requirements and indicates understanding: Yes  Are you in the first 12 months of your Medicare coverage?  No    Healthy Habits:     In general, how would you rate your overall health?  Good    Frequency of exercise:  1 day/week    Duration of exercise:  15-30 minutes    Do you usually eat at least 4 servings of fruit and vegetables a day, include whole grains    & fiber and avoid regularly eating high fat or \"junk\" foods?  No    Taking medications regularly:  Yes    Medication side effects:  None    Ability to successfully perform activities of daily living:  No assistance needed    Home Safety:  No safety concerns identified    Hearing Impairment:  Difficulty following a conversation in a noisy restaurant or crowded room    In the past 6 months, have you been bothered by leaking of urine?  No    In general, how would you rate your overall mental or emotional health?  Good      PHQ-2 Total Score: 0    Additional concerns today:  No      Have you ever done Advance Care Planning? (For example, a Health Directive, POLST, or a discussion with a medical provider or your loved ones about your wishes): No, advance care planning information given to patient to review.  Patient plans to discuss their wishes with loved ones or provider.      Fall risk  Fallen 2 or more times in the past year?: Yes  Any fall with injury in the past year?: Yes    Tripped on a rug, fell backwards.    Cognitive Screening   1) Repeat 3 items (Leader, Season, Table)    2) Clock draw: NORMAL  3) 3 item recall: Recalls 2 objects   Results: NORMAL clock, 1-2 items recalled: COGNITIVE IMPAIRMENT LESS LIKELY    Mini-CogTM Copyright KAYLIE Zamora. Licensed by the author for use in [facility]; reprinted with permission ([email or web address]). All rights reserved.        Reviewed and updated as needed this visit by clinical staff   Tobacco  " Allergies  Meds  Problems  Med Hx  Surg Hx  Fam Hx          Reviewed and updated as needed this visit by Provider   Tobacco  Allergies  Meds  Problems  Med Hx  Surg Hx  Fam Hx         Social History     Tobacco Use     Smoking status: Former     Packs/day: 0.50     Years: 10.00     Pack years: 5.00     Types: Cigarettes     Quit date: 1966     Years since quittin.9     Smokeless tobacco: Never     Tobacco comments:     no smokers in household   Substance Use Topics     Alcohol use: No     Alcohol/week: 1.7 standard drinks     Comment: beerx2/ x8per month         Alcohol Use 2022   Prescreen: >3 drinks/day or >7 drinks/week? No   Prescreen: >3 drinks/day or >7 drinks/week? -   AUDIT SCORE  -       Current providers sharing in care for this patient include:   Patient Care Team:  Jacqueline Brandt MD as PCP - General  Jacqueline Brandt MD as Assigned PCP    The following health maintenance items are reviewed in Epic and correct as of today:  Health Maintenance   Topic Date Due     ZOSTER IMMUNIZATION (2 of 3) 2013     COVID-19 Vaccine (4 - Booster for Moderna series) 2022     INFLUENZA VACCINE (1) 2022     BMP  2022     CMP  2022     LIPID  2022     MICROALBUMIN  2022     ANNUAL REVIEW OF HM ORDERS  2022     TSH W/FREE T4 REFLEX  2022     HEMOGLOBIN  2022     PHQ-9  2023     DTAP/TDAP/TD IMMUNIZATION (4 - Td or Tdap) 2023     MEDICARE ANNUAL WELLNESS VISIT  2023     FALL RISK ASSESSMENT  2023     ADVANCE CARE PLANNING  2026     DEPRESSION ACTION PLAN  Completed     Pneumococcal Vaccine: 65+ Years  Completed     URINALYSIS  Completed     IPV IMMUNIZATION  Aged Out     MENINGITIS IMMUNIZATION  Aged Out       Review of Systems   Constitutional: Negative for chills and fever.   HENT: Positive for congestion and hearing loss (has hearing loss). Negative for ear pain and sore throat.    Eyes:  "Positive for visual disturbance (macular degeneration). Negative for pain.   Respiratory: Positive for cough. Negative for shortness of breath.    Cardiovascular: Negative for chest pain, palpitations and peripheral edema.   Gastrointestinal: Positive for constipation and heartburn. Negative for abdominal pain, diarrhea, hematochezia and nausea.   Breasts:  Negative for tenderness, breast mass and discharge.   Genitourinary: Negative for dysuria, frequency, genital sores, hematuria, pelvic pain, urgency, vaginal bleeding and vaginal discharge.   Musculoskeletal: Positive for arthralgias and myalgias. Negative for joint swelling.   Skin: Negative for rash.   Neurological: Negative for dizziness, weakness, headaches and paresthesias.   Psychiatric/Behavioral: Negative for mood changes. The patient is nervous/anxious.        OBJECTIVE:   BP (!) 150/80   Pulse 67   Temp 97.8  F (36.6  C) (Temporal)   Resp 16   Ht 1.573 m (5' 1.91\")   Wt 63.3 kg (139 lb 8 oz)   SpO2 96%   BMI 25.59 kg/m   Estimated body mass index is 25.59 kg/m  as calculated from the following:    Height as of this encounter: 1.573 m (5' 1.91\").    Weight as of this encounter: 63.3 kg (139 lb 8 oz).  Physical Exam  Constitutional:       General: She is not in acute distress.     Appearance: She is well-developed.   HENT:      Right Ear: Tympanic membrane and external ear normal.      Left Ear: Tympanic membrane and external ear normal.      Nose: Nose normal.   Eyes:      General:         Right eye: No discharge.         Left eye: No discharge.      Conjunctiva/sclera: Conjunctivae normal.      Pupils: Pupils are equal, round, and reactive to light.   Neck:      Thyroid: No thyroid mass.   Cardiovascular:      Rate and Rhythm: Normal rate and regular rhythm.      Heart sounds: Normal heart sounds, S1 normal and S2 normal. No murmur heard.  Pulmonary:      Effort: Pulmonary effort is normal. No respiratory distress.      Breath sounds: Normal " breath sounds. No wheezing or rales.   Abdominal:      General: Bowel sounds are normal.      Palpations: Abdomen is soft. There is no mass.      Tenderness: There is no abdominal tenderness.   Musculoskeletal:         General: Normal range of motion.      Cervical back: Neck supple.   Lymphadenopathy:      Cervical: No cervical adenopathy.   Skin:     General: Skin is warm and dry.      Findings: No rash.   Neurological:      Mental Status: She is alert and oriented to person, place, and time.   Psychiatric:         Mood and Affect: Mood normal.         Behavior: Behavior normal.         Thought Content: Thought content normal.         Judgment: Judgment normal.           ASSESSMENT / PLAN:   (Z00.00) Encounter for Medicare annual wellness exam  (primary encounter diagnosis)  Comment: She is moving into an independent senior living community.  She is excited for this although she does admit that it is difficult packing up her home that she has lived in for so many years.    (E03.9) Hypothyroidism, unspecified type  Comment: Labs drawn.  Refills given.  Plan: TSH WITH FREE T4 REFLEX, levothyroxine         (SYNTHROID/LEVOTHROID) 88 MCG tablet          (I10) Hypertension, goal below 150/90  Comment: Blood pressure right at 150s.  Suspect this is related to current stress with moving.  We discussed rechecking this after the move and things have settled.  We will continue her metoprolol without change.  Plan: COMPREHENSIVE METABOLIC PANEL, Lipid panel         reflex to direct LDL Non-fasting, Albumin         Random Urine Quantitative with Creat Ratio,         Hemoglobin, metoprolol succinate ER (TOPROL XL)        100 MG 24 hr tablet          (E78.5) Hyperlipidemia, unspecified hyperlipidemia type  Comment: Continues on statin.  Labs drawn.  Refills given.  Plan: COMPREHENSIVE METABOLIC PANEL, Lipid panel         reflex to direct LDL Non-fasting, simvastatin         (ZOCOR) 20 MG tablet          (G43.109) Migraine with  aura and without status migrainosus, not intractable  Comment: She finds this effective.  She still has a about 4 tablets left  Plan: SUMAtriptan (IMITREX) 25 MG tablet          (F33.1) Moderate recurrent major depression (H)  Comment: Stable.  Refills given.  Plan: venlafaxine (EFFEXOR XR) 150 MG 24 hr capsule          (G47.00) Insomnia, unspecified type  Comment: Stable.  Refills given.  Plan: traZODone (DESYREL) 100 MG tablet          (N18.2) Chronic kidney disease, stage 2 (mild)  Comment: Stable.    (K21.9) Gastroesophageal reflux disease without esophagitis  Comment: Uses omeprazole rarely.  Finds it effective.    (K59.00) Constipation, unspecified constipation type  Comment: Suspect related to medications.  Will not do any medication changes as she is currently moving.  She is taking senna once in a while.  Encouraged her to take this regularly.  Also encouraged her to increase her water intake.  Plan: senna-docusate (SENOKOT-S/PERICOLACE) 8.6-50 MG        tablet          COUNSELING:  Reviewed preventive health counseling, as reflected in patient instructions        She reports that she quit smoking about 56 years ago. She has a 5.00 pack-year smoking history. She has never used smokeless tobacco.      Appropriate preventive services were discussed with this patient, including applicable screening as appropriate for cardiovascular disease, diabetes, osteopenia/osteoporosis, and glaucoma.  As appropriate for age/gender, discussed screening for colorectal cancer, prostate cancer, breast cancer, and cervical cancer. Checklist reviewing preventive services available has been given to the patient.    Reviewed patients plan of care and provided an AVS. The Basic Care Plan (routine screening as documented in Health Maintenance) for Erwin meets the Care Plan requirement. This Care Plan has been established and reviewed with the Patient and daughter.      Jacqueline Brandt MD  [facility]  [place]    Identified Health Risks:    She is at risk for lack of exercise and has been provided with information to increase physical activity for the benefit of her well-being.  The patient was counseled and encouraged to consider modifying their diet and eating habits. She was provided with information on recommended healthy diet options.  The patient was provided with written information regarding signs of hearing loss.  She is at risk for falling and has been provided with information to reduce the risk of falling at home.

## 2022-12-06 NOTE — PATIENT INSTRUCTIONS
At your visit today, we discussed your risk for falls and preventive options.    Fall Prevention  Falls often occur due to slipping, tripping or losing your balance. Millions of people fall every year and injure themselves. Here are ways to reduce your risk of falling again.     Think about your fall, was there anything that caused your fall that can be fixed, removed, or replaced?    Make your home safe by keeping walkways clear of objects you may trip over, such as electric cords.    Use non-slip pads under rugs. Don't use area rugs or small throw rugs.    Use non-slip mats in bathtubs and showers.    Install handrails and lights on staircases. The handrails should be on both sides of the stairs.    Don't walk in poorly lit areas.    Don't stand on chairs or wobbly ladders.    Use caution when reaching overhead or looking upward. This position can cause a loss of balance.    Be sure your shoes fit properly, have non-slip bottoms and are in good condition.     Wear shoes both inside and out. Don't go barefoot or wear slippers.    Be cautious when going up and down stairs, curbs, and when walking on uneven sidewalks.    If your balance is poor, consider using a cane or walker.    If your fall was related to alcohol use, stop or limit alcohol intake.     If your fall was related to use of sleeping medicines, talk to your healthcare provider about this. You may need to reduce your dosage at bedtime if you awaken during the night to go to the bathroom.      To reduce the need for nighttime bathroom trips:  ? Don't drink fluids for several hours before going to bed  ? Empty your bladder before going to bed  ? Men can keep a urinal at the bedside    Stay as active as you can. Balance, flexibility, strength, and endurance all come from exercise. They all play a role in preventing falls. Ask your healthcare provider which types of activity are right for you.    Get your vision checked on a regular basis.    If you have  pets, know where they are before you stand up or walk so you don't trip over them.    Use night lights.    Go over all your medicines with a pharmacist or other healthcare provider to see if any of them could make you more likely to fall.  [employer] last reviewed this educational content on 4/1/2018 2000-2021 [employer]. All rights reserved. This information is not intended as a substitute for professional medical care. Always follow your healthcare professional's instructions.        Patient Education   Personalized Prevention Plan  You are due for the preventive services outlined below.  Your care team is available to assist you in scheduling these services.  If you have already completed any of these items, please share that information with your care team to update in your medical record.  Health Maintenance Due   Topic Date Due     Zoster (Shingles) Vaccine (2 of 3) 12/27/2013     COVID-19 Vaccine (4 - Booster for Moderna series) 01/06/2022     Flu Vaccine (1) 09/01/2022     Basic Metabolic Panel  11/26/2022     Comprehensive Metabolic Panel  11/26/2022     Cholesterol Lab  11/26/2022     Kidney Microalbumin Urine Test  11/26/2022     ANNUAL REVIEW OF HM ORDERS  11/26/2022     Thyroid Function Lab  11/26/2022     Hemoglobin  11/26/2022       Exercise for a Healthier Heart  You may wonder how you can improve the health of your heart. If you re thinking about exercise, you re on the right track. You don t need to become an athlete. But you do need a certain amount of brisk exercise to help strengthen your heart. If you have been diagnosed with a heart condition, your healthcare provider may advise exercise to help stabilize your condition. To help make exercise a habit, choose safe, fun activities.      Exercise with a friend. When activity is fun, you're more likely to stick with it.   Before you start  Check with your healthcare provider before starting an exercise program. This is especially  important if you have not been active for a while. It's also important if you have a long-term (chronic) health problem such as heart disease, diabetes, or obesity. Or if you are at high risk for having these problems.   Why exercise?  Exercising regularly offers many healthy rewards. It can help you do all of the following:     Improve your blood cholesterol level to help prevent further heart trouble    Lower your blood pressure to help prevent a stroke or heart attack    Control diabetes, or reduce your risk of getting this disease    Improve your heart and lung function    Reach and stay at a healthy weight    Make your muscles stronger so you can stay active    Prevent falls and fractures by slowing the loss of bone mass (osteoporosis)    Manage stress better    Reduce your blood pressure    Improve your sense of self and your body image  Exercise tips      Ease into your routine. Set small goals. Then build on them. If you are not sure what your activity level should be, talk with your healthcare provider first before starting an exercise routine.    Exercise on most days. Aim for a total of 150 minutes (2 hours and 30 minutes) or more of moderate-intensity aerobic activity each week. Or 75 minutes (1 hour and 15 minutes) or more of vigorous-intensity aerobic activity each week. Or try for a combination of both. Moderate activity means that you breathe heavier and your heart rate increases but you can still talk. Think about doing 40 minutes of moderate exercise, 3 to 4 times a week. For best results, activity should last for about 40 minutes to lower blood pressure and cholesterol. It's OK to work up to the 40-minute period over time. Examples of moderate-intensity activity are walking 1 mile in 15 minutes. Or doing 30 to 45 minutes of yard work.    Step up your daily activity level.  Along with your exercise program, try being more active the whole day. Walk instead of drive. Or park further away so that you  take more steps each day. Do more household tasks or yard work. You may not be able to meet the advised mount of physical activity. But doing some moderate- or vigorous-intensity aerobic activity can help reduce your risk for heart disease. Your healthcare provider can help you figure out what is best for you.    Choose 1 or more activities you enjoy.  Walking is one of the easiest things you can do. You can also try swimming, riding a bike, dancing, or taking an exercise class.    When to call your healthcare provider  Call your healthcare provider if you have any of these:     Chest pain or feel dizzy or lightheaded    Burning, tightness, pressure, or heaviness in your chest, neck, shoulders, back, or arms    Abnormal shortness of breath    More joint or muscle pain    A very fast or irregular heartbeat (palpitations)  [employer] last reviewed this educational content on 7/1/2019 2000-2021 [employer]. All rights reserved. This information is not intended as a substitute for professional medical care. Always follow your healthcare professional's instructions.          Understanding USDA MyPlate  The USDA has guidelines to help you make healthy food choices. These are called MyPlate. MyPlate shows the food groups that make up healthy meals using the image of a place setting. Before you eat, think about the healthiest choices for what to put on your plate or in your cup or bowl. To learn more about building a healthy plate, visit www.choosemyplate.gov.    The food groups    Fruits. Any fruit or 100% fruit juice counts as part of the Fruit Group. Fruits may be fresh, canned, frozen, or dried, and may be whole, cut-up, or pureed. Make 1/2 of your plate fruits and vegetables.    Vegetables. Any vegetable or 100% vegetable juice counts as a member of the Vegetable Group. Vegetables may be fresh, frozen, canned, or dried. They can be served raw or cooked and may be whole, cut-up, or mashed. Make 1/2 of  your plate fruits and vegetables.    Grains. All foods made from grains are part of the Grains Group. These include wheat, rice, oats, cornmeal, and barley. Grains are often used to make foods such as bread, pasta, oatmeal, cereal, tortillas, and grits. Grains should be no more than 1/4 of your plate. At least half of your grains should be whole grains.    Protein. This group includes meat, poultry, seafood, beans and peas, eggs, processed soy products (such as tofu), nuts (including nut butters), and seeds. Make protein choices no more than 1/4 of your plate. Meat and poultry choices should be lean or low fat.    Dairy. The Dairy Group includes all fluid milk products and foods made from milk that contain calcium, such as yogurt and cheese. (Foods that have little calcium, such as cream, butter, and cream cheese, are not part of this group.) Most dairy choices should be low-fat or fat-free.    Oils. Oils aren't a food group, but they do contain essential nutrients. However it's important to watch your intake of oils. These are fats that are liquid at room temperature. They include canola, corn, olive, soybean, vegetable, and sunflower oil. Foods that are mainly oil include mayonnaise, certain salad dressings, and soft margarines. You likely already get your daily oil allowance from the foods you eat.  Things to limit  Eating healthy also means limiting these things in your diet:       Salt (sodium). Many processed foods have a lot of sodium. To keep sodium intake down, eat fresh vegetables, meats, poultry, and seafood when possible. Purchase low-sodium, reduced-sodium, or no-salt-added food products at the store. And don't add salt to your meals at home. Instead, season them with herbs and spices such as dill, oregano, cumin, and paprika. Or try adding flavor with lemon or lime zest and juice.    Saturated fat. Saturated fats are most often found in animal products such as beef, pork, and chicken. They are often  solid at room temperature, such as butter. To reduce your saturated fat intake, choose leaner cuts of meat and poultry. And try healthier cooking methods such as grilling, broiling, roasting, or baking. For a simple lower-fat swap, use plain nonfat yogurt instead of mayonnaise when making potato salad or macaroni salad.    Added sugars. These are sugars added to foods. They are in foods such as ice cream, candy, soda, fruit drinks, sports drinks, energy drinks, cookies, pastries, jams, and syrups. Cut down on added sugars by sharing sweet treats with a family member or friend. You can also choose fruit for dessert, and drink water or other unsweetened beverages.     [employer] last reviewed this educational content on 6/1/2020 2000-2021 [employer]. All rights reserved. This information is not intended as a substitute for professional medical care. Always follow your healthcare professional's instructions.          Signs of Hearing Loss      Hearing much better with one ear can be a sign of hearing loss.   Hearing loss is a problem shared by many people. In fact, it is one of the most common health problems, particularly as people age. Most people age 65 and older have some hearing loss. By age 80, almost everyone does. Hearing loss often occurs slowly over the years. So you may not realize your hearing has gotten worse.  Have your hearing checked  Call your healthcare provider if you:    Have to strain to hear normal conversation    Have to watch other people s faces very carefully to follow what they re saying    Need to ask people to repeat what they ve said    Often misunderstand what people are saying    Turn the volume of the television or radio up so high that others complain    Feel that people are mumbling when they re talking to you    Find that the effort to hear leaves you feeling tired and irritated    Notice, when using the phone, that you hear better with one ear than the  other  [employer] last reviewed this educational content on 1/1/2020 2000-2021 [employer]. All rights reserved. This information is not intended as a substitute for professional medical care. Always follow your healthcare professional's instructions.          Preventing Falls at Home  A person can fall for many reasons. Older adults may fall because reaction time slows down as we age. Your muscles and joints may get stiff, weak, or less flexible because of illness, medicines, or a physical condition.   Other health problems that make falls more likely include:     Arthritis    Dizziness or lightheadedness when you stand up (orthostatic hypotension)    History of a stroke    Dizziness    Anemia    Certain medicines taken for mental illness or to control blood pressure.    Problems with balance or gait    Bladder or urinary problems    History of falling    Changes in vision (vision impairment)    Changes in thinking skills and memory (cognitive impairment)  Injuries from a fall can include serious injuries such as broken bones, dislocated joints, internal bleeding and cuts. Injuries like these can limit your independence.   Prevention tips  To help prevent falls and fall-related injuries, follow the tips below.    Floors  To make floors safer:     Put nonskid pads under area rugs.    Remove small rugs.    Replace worn floor coverings.    Tack carpets firmly to each step on carpeted stairs. Put nonskid strips on the edges of uncarpeted stairs.    Keep floors and stairs free of clutter and cords.    Arrange furniture so there are clear pathways.    Clean up any spills right away.  Bathrooms    To make bathrooms safer:     Install grab bars in the tub or shower.    Apply nonskid strips or put a nonskid rubber mat in the tub or shower.    Sit on a bath chair to bathe.    Use bathmats with nonskid backing.  Lighting  To improve visibility in your home:      Keep a flashlight in each room. Or put a lamp  next to the bed within easy reach.    Put nightlights in the bedrooms, hallways, kitchen, and bathrooms.    Make sure all stairways have good lighting.    Take your time when going up and down stairs.    Put handrails on both sides of stairs and in walkways for more support. To prevent injury to your wrist or arm, don t use handrails to pull yourself up.    Install grab bars to pull yourself up.    Move or rearrange items that you use often. This will make them easier to find or reach.    Look at your home to find any safety hazards. Especially look at doorways, walkways, and the driveway. Remove or repair any safety problems that you find.  Other changes to make    Look around to find any safety hazards. Look closely at doorways, walkways, and the driveway. Remove or repair any safety problems that you find.    Wear shoes that fit well.    Take your time when going up and down stairs.    Put handrails on both sides of stairs and in walkways for more support. To prevent injury to your wrist or arm, don t use handrails to pull yourself up.    Install grab bars wherever needed to pull yourself up.    Arrange items that you use often. This will make them easier to find or reach.    [employer] last reviewed this educational content on 3/1/2020    2548-1072 [employer]. All rights reserved. This information is not intended as a substitute for professional medical care. Always follow your healthcare professional's instructions.

## 2023-03-02 DIAGNOSIS — E03.9 HYPOTHYROIDISM, UNSPECIFIED TYPE: ICD-10-CM

## 2023-03-06 RX ORDER — LEVOTHYROXINE SODIUM 88 UG/1
TABLET ORAL
Qty: 90 TABLET | Refills: 2 | Status: SHIPPED | OUTPATIENT
Start: 2023-03-06 | End: 2023-12-19

## 2023-07-11 ENCOUNTER — APPOINTMENT (OUTPATIENT)
Dept: CT IMAGING | Facility: CLINIC | Age: 88
End: 2023-07-11
Attending: PHYSICIAN ASSISTANT
Payer: MEDICARE

## 2023-07-11 ENCOUNTER — NURSE TRIAGE (OUTPATIENT)
Dept: FAMILY MEDICINE | Facility: OTHER | Age: 88
End: 2023-07-11
Payer: MEDICARE

## 2023-07-11 ENCOUNTER — APPOINTMENT (OUTPATIENT)
Dept: GENERAL RADIOLOGY | Facility: CLINIC | Age: 88
End: 2023-07-11
Attending: PHYSICIAN ASSISTANT
Payer: MEDICARE

## 2023-07-11 ENCOUNTER — HOSPITAL ENCOUNTER (EMERGENCY)
Facility: CLINIC | Age: 88
Discharge: HOME | End: 2023-07-11
Attending: PHYSICIAN ASSISTANT | Admitting: PHYSICIAN ASSISTANT
Payer: MEDICARE

## 2023-07-11 VITALS
SYSTOLIC BLOOD PRESSURE: 164 MMHG | TEMPERATURE: 97.9 F | OXYGEN SATURATION: 97 % | HEART RATE: 75 BPM | HEIGHT: 62 IN | BODY MASS INDEX: 25.51 KG/M2 | DIASTOLIC BLOOD PRESSURE: 101 MMHG | RESPIRATION RATE: 16 BRPM
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
## 2023-07-11 DIAGNOSIS — S23.41XA RIB SPRAIN: ICD-10-CM

## 2023-07-11 DIAGNOSIS — S02.401A MAXILLARY SINUS FRACTURE, CLOSED, INITIAL ENCOUNTER (H): ICD-10-CM

## 2023-07-11 PROCEDURE — 71101 X-RAY EXAM UNILAT RIBS/CHEST: CPT | Mod: RT

## 2023-07-11 PROCEDURE — 99284 EMERGENCY DEPT VISIT MOD MDM: CPT | Mod: 25 | Performed by: PHYSICIAN ASSISTANT

## 2023-07-11 PROCEDURE — G1010 CDSM STANSON: HCPCS

## 2023-07-11 PROCEDURE — 99284 EMERGENCY DEPT VISIT MOD MDM: CPT | Performed by: PHYSICIAN ASSISTANT

## 2023-07-11 RX ORDER — CEFDINIR 300 MG/1
300 CAPSULE ORAL 2 TIMES DAILY
Qty: 14 CAPSULE | Refills: 0 | Status: SHIPPED | OUTPATIENT
Start: 2023-07-11 | End: 2023-07-18

## 2023-07-11 NOTE — DISCHARGE INSTRUCTIONS
It was a pleasure working with you today!  I hope your condition improves rapidly!     As we discussed, you did fracture the front wall of the maxillary sinus on the right side.  I did speak with the ear, nose, and throat specialist and he recommended antibiotic prevention for 1 week.  He also recommended saline nasal spray in the right nostril 2-3 times per day 2 sprays each time to help keep the nasal passage open.  You can use ice over the painful area for 20 minutes every couple hours for the first 3-4 days, then switch to heat.  This should heal with time and will not require any surgery or other advanced treatment.  Please follow-up with your primary if you have any increased symptoms or problems, otherwise this will heal on its own over the next 6 weeks.    Your ribs did not show any sign of fracture.  Please ice the painful area for 20 minutes every couple hours for the first 2-3 days, and then switch to heat.  It is okay to use ibuprofen 600 mg every 6 hours as needed for pain and/or Tylenol 650 mg every 6 hours as needed for pain.  If you have a specific area that is really sore, you could use a lidocaine patch over that area and keep it on for 12-18 hours to help.  Continue working on deep breathing exercises to make sure that your lungs stay healthy.  Avoid lifting over 5 pounds for the next week to avoid irritation of the sprained ribs.

## 2023-07-11 NOTE — TELEPHONE ENCOUNTER
Spoke with Magaly  Took a tumble today hit head.  Landed flat, skinned up her knee.  Right side rib area is tender.  She is able to walk normal, hurts with coughing in her rib area.  Slight headache.  Hit mostly her check bone.     Spoke with [facility].  Due to possible multiple impact locations and age should be evaluated in ED.    Informed daughter.  Agrees to plan..    MELO MustafaN, RN, PHN  [facility] ~ Registered Nurse  Clinic Triage ~ [place] [facility] [place]  July 11, 2023    Reason for Disposition    Patient has a concerning injury to a specific part of the body (e.g., chest, leg, head)    Age over 65 years with an area of head swelling or bruise    Additional Information    Negative: Major injury from dangerous force (e.g., fall > 10 feet or 3 meters)    Negative: Major bleeding (e.g., actively dripping or spurting) and can't be stopped    Negative: Shock suspected (e.g., cold/pale/clammy skin, too weak to stand)    Negative: Difficult to awaken or acting confused (e.g., disoriented, slurred speech)    Negative: SEVERE weakness (i.e., unable to walk or barely able to walk, requires support) and new-onset or worsening    Negative: Can't stand (bear weight) or walk and new-onset after fall    Negative: Sounds like a life-threatening emergency to the triager    Negative: ACUTE NEUROLOGIC SYMPTOM and symptom present now    Negative: Knocked out (unconscious) > 1 minute    Negative: Seizure (convulsion) occurred  (Exception: Prior history of seizures and now alert and without Acute Neurologic Symptoms.)    Negative: Neck pain after dangerous injury (e.g., MVA, diving, trampoline, contact sports, fall > 10 feet or 3 meters)  (Exception: Neck pain began > 1 hour after injury.)    Negative: Major bleeding (actively dripping or spurting) that can't be stopped    Negative: Penetrating head injury (e.g., knife, gunshot wound, metal object)    Negative: Sounds like a life-threatening emergency to the triager     Negative: Diagnosed with a concussion within last 14 days    Negative: Can't remember what happened (amnesia)    Negative: Vomiting once or more    Negative: Watery or blood-tinged fluid dripping from the nose or ears    Negative: Taking Coumadin (warfarin) or other strong blood thinner, or known bleeding disorder (e.g., thrombocytopenia)    Negative: One or two 'black eyes' (bruising, purple color of eyelids), and onset within 24 hours of head injury    Negative: Bleeding won't stop after 10 minutes of direct pressure (using correct technique)    Negative: Skin is split open or gaping (length > 1/2 inch or 12 mm)    Negative: Dangerous injury (e.g., MVA, diving, trampoline, contact sports, fall > 10 feet or 3 meters) or severe blow from hard object (e.g., golf club or baseball bat)    Negative: Large swelling or bruise (> 2 inches or 5 cm)    Negative: Severe headache    Negative: Knocked out (unconscious) < 1 minute and now fine    Negative: ACUTE NEUROLOGIC SYMPTOM and now fine    Protocols used: FALLS AND [id number], HEAD INJURY-A-OH

## 2023-07-11 NOTE — ED TRIAGE NOTES
Patient tripped on her shoe around 1330 and fell face first onto carpet. Swelling to R cheek area, pain to R side of chest. Not on blood thinners, no LOC.     Triage Assessment       Row Name 07/11/23 1500       Triage Assessment (Adult)    Airway WDL WDL       Respiratory WDL    Respiratory WDL WDL       Cardiac WDL    Cardiac WDL WDL       Peripheral/Neurovascular WDL    Peripheral Neurovascular WDL WDL

## 2023-07-11 NOTE — ED PROVIDER NOTES
History     Chief Complaint   Patient presents with     Fall     HPI  Erwin Tolliver is a 88 year old female who presents for evaluation of a fall that occurred 2 hours ago.  She was walking very quickly down the hallway at her assisted living facility.  She tripped on her right shoe and fell forward on outstretched hands.  Her right infraorbital area hit the ground as well as her right chest.  She has right anterior chest wall discomfort which is worse with a deep breath.  Currently pain is rated 5 on a scale of 10.  She did take a dose of Tylenol at home prior to coming to the ED.  She denies any hemoptysis or dyspnea.  Denies any abdominal pain, nausea, or vomiting.  No LOC with this injury.  Denies any headache or neck pain.  No extremity weakness/numbness/tingling.  Denied having any active bleeding.  No diplopia or blurry vision.        Allergies:  Allergies   Allergen Reactions     Aspirin      headaches,tremors,nausea     Augmentin [Amoxicillin-Pot Clavulanate] Nausea and Vomiting       Problem List:    Patient Active Problem List    Diagnosis Date Noted     Chronic kidney disease, stage 2 (mild) 12/06/2022     Priority: Medium     Other chronic sinusitis 03/01/2017     Priority: Medium     Penicillin allergy 03/01/2017     Priority: Medium     Hypertension, goal below 150/90 05/05/2015     Priority: Medium     Macular degeneration (senile) of retina 04/04/2014     Priority: Medium     Advanced directives, counseling/discussion 10/01/2013     Priority: Medium     Moderate recurrent major depression (H) 10/01/2013     Priority: Medium     Atopic rhinitis 01/09/2012     Priority: Medium     GERD (gastroesophageal reflux disease) 08/02/2011     Priority: Medium     Osteopenia 11/15/2010     Priority: Medium     Hyperlipidemia, unspecified 10/31/2010     Priority: Medium     Hypothyroidism 12/14/2006     Priority: Medium     Migraine with aura      Priority: Medium        Past Medical History:    Past  Medical History:   Diagnosis Date     Arthritis      CKD (chronic kidney disease) stage 3, GFR 30-59 ml/min (H) 8/15/2012     Depressive disorder      Depressive disorder, not elsewhere classified      Hepatitis, unspecified      Hypertension      Migraine with aura, without mention of intractable migraine without mention of status migrainosus      Need for prophylactic hormone replacement therapy (postmenopausal)      Palpitations      SVT (supraventricular tachycardia) (H) 10/8/2012     Thyroid disease      Unspecified essential hypertension        Past Surgical History:    Past Surgical History:   Procedure Laterality Date     APPENDECTOMY       BACK SURGERY      bulged disc     COLONOSCOPY       ENT SURGERY       EYE SURGERY       HC KNEE SCOPE, DIAGNOSTIC      Arthroscopy, Knee     HC REDUCTION OF LARGE BREAST       ORTHOPEDIC SURGERY       SEPTOPLASTY N/A 2017    Procedure: SEPTOPLASTY;  Septoplasty, Submucosal resection of the turbinates;  Surgeon: Edwin Cox MD;  Location: [facility]     SHOULDER SURGERY  2019     Z TOTAL ABDOM HYSTERECTOMY      Hysterectomy, Total Abdominal and BSO -benign - age 41     [facility] COLONOSCOPY W BIOPSY  06       Family History:    Family History   Problem Relation Age of Onset     Alzheimer Disease Mother      Arthritis Sister          at 72 years     Lipids Sister      Osteoporosis Sister         osteoporosis     Genetic Disorder Sister         down syndrome     Alzheimer Disease Sister      Thyroid Disease Sister      Cardiovascular Brother          at 72 years of CHF       Social History:  Marital Status:   [5]  Social History     Tobacco Use     Smoking status: Former     Packs/day: 0.50     Years: 10.00     Pack years: 5.00     Types: Cigarettes     Quit date: 1966     Years since quittin.5     Smokeless tobacco: Never     Tobacco comments:     no smokers in household   Vaping Use     Vaping Use: Never used   Substance  "Use Topics     Alcohol use: No     Alcohol/week: 1.7 standard drinks of alcohol     Comment: beerx2/ x8per month     Drug use: No        Medications:    cefdinir (OMNICEF) 300 MG capsule  ipratropium (ATROVENT) 0.03 % nasal spray  levothyroxine (SYNTHROID/LEVOTHROID) 88 MCG tablet  metoprolol succinate ER (TOPROL XL) 100 MG 24 hr tablet  Multiple Vitamins-Minerals (PRESERVISION AREDS PO)  nitroGLYcerin (NITROSTAT) 0.4 MG sublingual tablet  omeprazole (PRILOSEC) 20 MG DR capsule  senna-docusate (SENOKOT-S/PERICOLACE) 8.6-50 MG tablet  simvastatin (ZOCOR) 20 MG tablet  SUMAtriptan (IMITREX) 25 MG tablet  traZODone (DESYREL) 100 MG tablet  venlafaxine (EFFEXOR XR) 150 MG 24 hr capsule          Review of Systems   All other systems reviewed and are negative.      Physical Exam   BP: (!) 187/97  Pulse: 78  Temp: 97.9  F (36.6  C)  Resp: 18  Height: 157.5 cm (5' 2\")  SpO2: 97 %      Physical Exam  Vitals and nursing note reviewed.   Constitutional:       General: She is not in acute distress.     Appearance: She is not diaphoretic.   HENT:      Head: Normocephalic.        Right Ear: Tympanic membrane, ear canal and external ear normal.      Left Ear: Tympanic membrane, ear canal and external ear normal.      Ears:      Comments: No hemotympanum.     Nose:      Comments: Mild abrasion on the nasal bridge but there is no acute swelling, bruising, or tenderness.  No septal deviation.  No septal hematoma.  Able to move air equally through both nostrils.     Mouth/Throat:      Mouth: Mucous membranes are moist.      Pharynx: No oropharyngeal exudate or posterior oropharyngeal erythema.      Comments: No sign of dental injury.  No trismus or malocclusion.  No TMJ tenderness.  Eyes:      General: No scleral icterus.        Right eye: No discharge.         Left eye: No discharge.      Extraocular Movements: Extraocular movements intact.      Conjunctiva/sclera: Conjunctivae normal.      Pupils: Pupils are equal, round, and " reactive to light.      Comments: No sign of entrapment.   Neck:      Thyroid: No thyromegaly.      Comments: No midline spinous process or paravertebral muscular tenderness.  No pain with axial loading.  Normal range of motion.  Upper extremities with equal proper range of motion, strength, DTRs, and sensation.  Cardiovascular:      Rate and Rhythm: Normal rate and regular rhythm.      Heart sounds: Normal heart sounds. No murmur heard.  Pulmonary:      Effort: Pulmonary effort is normal. No respiratory distress.      Breath sounds: Normal breath sounds. No wheezing or rales.   Chest:      Chest wall: Tenderness (Tenderness to the anterior chest wall from about rib 4 down to rib 8.  No crepitus or step-off.  No bruising or swelling.  No posterior tenderness.) present.   Abdominal:      General: Bowel sounds are normal. There is no distension.      Palpations: Abdomen is soft. There is no mass.      Tenderness: There is no abdominal tenderness. There is no guarding or rebound.   Musculoskeletal:         General: No tenderness or deformity. Normal range of motion.      Cervical back: Normal range of motion and neck supple. No rigidity or tenderness.   Lymphadenopathy:      Cervical: No cervical adenopathy.   Skin:     General: Skin is warm and dry.      Capillary Refill: Capillary refill takes less than 2 seconds.      Findings: No erythema or rash.   Neurological:      General: No focal deficit present.      Mental Status: She is alert and oriented to person, place, and time.      Cranial Nerves: No cranial nerve deficit.      Sensory: No sensory deficit.      Motor: No weakness.      Coordination: Coordination normal.      Gait: Gait normal.      Deep Tendon Reflexes: Reflexes normal.   Psychiatric:         Behavior: Behavior normal.         Thought Content: Thought content normal.         ED Course                 Procedures              Critical Care time:  none               Results for orders placed or  performed during the hospital encounter of 07/11/23 (from the past 24 hour(s))   CT Facial Bones without Contrast    Narrative    CT FACIAL BONES WITHOUT CONTRAST 7/11/2023 3:56 PM     HISTORY: fall, right infraorbital pain/swelling    TECHNIQUE: Axial CT images of the facial bones were completed with  sagittal and coronal reformations. Radiation dose for this scan was  reduced using automated exposure control, adjustment of the mA and/or  kV according to patient size, or iterative reconstruction technique.     COMPARISON: Facial CT 3/2/2017, cervical spine CT 4/10/2020      Impression    IMPRESSION:    Acute mildly displaced fracture involving the right maxillary sinus  anterior wall. The fracture extends into the infraorbital foramen but  spares the inferior orbital rim and orbital floor. No other fractures  are identified. Right premaxillary soft tissue contusion.    Small area of soft tissue/fluid attenuation within the right orbit  interposed between the medial and inferior rectus muscles (series 6  image 29), probably unchanged in retrospect compared to 4/10/2020,  presumably incidental prominent vein/varix or hemangioma.    Severe left temporomandibular joint degenerative change. Cervical  spine degenerative change with presumed benign lucency/erosion within  the C2 vertebra at the base of the dens, enlarged compared to  4/10/2020.    IMPRESSION:    Acute fracture involving the right maxillary sinus anterior wall.    PRASANNA JOHNSON MD         SYSTEM ID:  [id number]   Ribs XR, unilat 3 views + PA chest, right    Narrative    RIGHT RIBS AND CHEST THREE VIEWS   7/11/2023 4:01 PM     HISTORY:  Right mid/lower anterior rib pain, fall.    COMPARISON: Chest radiographs from 5/16/2011.      Impression    IMPRESSION:  1. 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  2. The cardiomediastinal silhouette and pulmonary vasculature appear  normal. No  infiltrates or effusions.  3. Degenerative changes in the visualized spine.  4. There is no evidence of fracture or pneumothorax.    MIRA RIVERA MD         SYSTEM ID:  [id number]       Medications - No data to display    Assessments & Plan (with Medical Decision Making)     Fall  Maxillary sinus fracture, closed, initial encounter (H)  Rib sprain     88 year old female presents for evaluation of a fall 2 hours prior to arrival.  She tripped moving forward and fell with her hands out in front of her.  She hit the right infraorbital area on the ground as well as her right-sided chest wall.  Pain with deep breathing but no shortness of breath.  No head or neck injury.  No LOC.  See HPI above for details.  On exam blood pressure 187/97, temperature 97.9, pulse 78, respiration 18, oxygen saturation 97% on room air.  Patient is in no acute distress.  She does have infraorbital swelling and contusion.  No step-off.  Abrasion to the nasal bridge but no sign of trauma or fracture.  No septal deviation or septal hematoma.  Eye exam normal.  Extraocular movements intact.  No sign of entrapment.  No sign of dental or jaw trauma.  She is tender to palpation of the anterior chest wall on the right from ribs 4-8.  No crepitus or step-off.  No bruising or swelling.  Good breath sounds heard throughout.  Abdomen soft and nontender.  No sign of extremity swelling, tenderness, or deformity.  CT of the facial bones with anterior maxillary sinus fracture.  No infraorbital rim involvement.  X-ray with out rib fracture, pleural effusion, or pneumothorax.  I performed an independent review of the x-ray and agree with the findings.  I reviewed these findings with the patient.  I consulted with ENT, Dr. Cox, and he recommended prophylactic antibiotic therapy for a week, saline drop rinses to the intranasal area, and ENT follow-up only if symptoms worsening.  He described conservative care management as this should heal on its  own.  Therefore, we prescribed Omnicef for a week.  She will use saline drops.  Ice to be applied to the painful area for 20 minutes every couple hours for the next 2 days and then heat to be used thereafter.  Same concepts apply for her ribs.  Deep breathing exercises recommended.  Avoid lifting over 5 pounds for the next week.  Avoid reaching/pulling/pushing with her right arm.  Okay to use OTC lidocaine patch.  Indications for return reviewed with the patient and her daughter.  They were in agreement and the patient was suitable for discharge.     I have reviewed the nursing notes.    I have reviewed the findings, diagnosis, plan and need for follow up with the patient.           Medical Decision Making  The patient's presentation was of moderate complexity (an acute complicated injury).    The patient's evaluation involved:  ordering and/or review of 2 test(s) in this encounter (see separate area of note for details)    The patient's management necessitated moderate risk (prescription drug management including medications given in the ED).        Discharge Medication List as of 7/11/2023  4:44 PM      START taking these medications    Details   cefdinir (OMNICEF) 300 MG capsule Take 1 capsule (300 mg) by mouth 2 times daily for 7 days, Disp-14 capsule, R-0, E-Prescribe             Final diagnoses:   Fall   Maxillary sinus fracture, closed, initial encounter (H)   Rib sprain     Disclaimer: This note consists of symbols derived from keyboarding, dictation and/or voice recognition software. As a result, there may be errors in the script that have gone undetected. Please consider this when interpreting information found in this chart.      7/11/2023   [facility] EMERGENCY DEPT     Taco Lanier PA-C  07/11/23 2023

## 2023-09-19 ENCOUNTER — OFFICE VISIT (OUTPATIENT)
Dept: FAMILY MEDICINE | Facility: OTHER | Age: 88
End: 2023-09-19
Payer: MEDICARE

## 2023-09-19 VITALS
TEMPERATURE: 98.1 F | HEIGHT: 61 IN | WEIGHT: 135 LBS | HEART RATE: 75 BPM | DIASTOLIC BLOOD PRESSURE: 92 MMHG | SYSTOLIC BLOOD PRESSURE: 162 MMHG | OXYGEN SATURATION: 96 % | BODY MASS INDEX: 25.49 KG/M2 | RESPIRATION RATE: 16 BRPM

## 2023-09-19 DIAGNOSIS — J30.89 SEASONAL ALLERGIC RHINITIS DUE TO OTHER ALLERGIC TRIGGER: ICD-10-CM

## 2023-09-19 DIAGNOSIS — F33.1 MODERATE RECURRENT MAJOR DEPRESSION (H): ICD-10-CM

## 2023-09-19 DIAGNOSIS — K59.00 CONSTIPATION, UNSPECIFIED CONSTIPATION TYPE: Primary | ICD-10-CM

## 2023-09-19 PROBLEM — Z88.0 PENICILLIN ALLERGY: Status: RESOLVED | Noted: 2017-03-01 | Resolved: 2023-09-19

## 2023-09-19 PROCEDURE — 99214 OFFICE O/P EST MOD 30 MIN: CPT | Mod: 25 | Performed by: FAMILY MEDICINE

## 2023-09-19 PROCEDURE — 90662 IIV NO PRSV INCREASED AG IM: CPT | Performed by: FAMILY MEDICINE

## 2023-09-19 PROCEDURE — G0008 ADMIN INFLUENZA VIRUS VAC: HCPCS | Performed by: FAMILY MEDICINE

## 2023-09-19 RX ORDER — VENLAFAXINE HYDROCHLORIDE 75 MG/1
75 CAPSULE, EXTENDED RELEASE ORAL DAILY
Qty: 90 CAPSULE | Refills: 3 | Status: SHIPPED | OUTPATIENT
Start: 2023-09-19 | End: 2023-12-19

## 2023-09-19 RX ORDER — IPRATROPIUM BROMIDE 21 UG/1
2 SPRAY, METERED NASAL 3 TIMES DAILY
Qty: 30 ML | Refills: 11 | Status: SHIPPED | OUTPATIENT
Start: 2023-09-19

## 2023-09-19 ASSESSMENT — PAIN SCALES - GENERAL: PAINLEVEL: NO PAIN (0)

## 2023-09-19 ASSESSMENT — PATIENT HEALTH QUESTIONNAIRE - PHQ9: SUM OF ALL RESPONSES TO PHQ QUESTIONS 1-9: 0

## 2023-09-19 NOTE — PROGRESS NOTES
Assessment & Plan     Constipation, unspecified constipation type  Discussed using senna on a daily basis for help with constipation.  If this is not helping after a week or 2 will add MiraLAX to her regimen.  If neither of these are helping her constipation will then refer for colonoscopy for further diagnostic evaluation.  We discussed also increasing her water intake.    - Adult GI  Referral - Procedure Only; Future    Moderate recurrent major depression (H)  Discussed that venlafaxine can cause constipation.  Her mood is stable and has been doing well for a while.  We will decrease her venlafaxine from 150 mg to 75 mg daily.  She is due for her annual wellness visit in December and can follow-up the dose change at that time.    - venlafaxine (EFFEXOR XR) 75 MG 24 hr capsule; Take 1 capsule (75 mg) by mouth daily    Seasonal allergic rhinitis due to other allergic trigger  She finds Atrovent nasal spray is helpful.  Refills were given.    - ipratropium (ATROVENT) 0.03 % nasal spray; Spray 2 sprays into both nostrils 3 times daily    Jacqueline Brandt MD  [facility]    Emily Hood is a 88 year old, presenting for the following health issues:  Hospital F/U      9/19/2023    11:16 AM   Additional Questions   Roomed by Renetta       HPI     Constipation  Onset/Duration: Constipation   Description:  Frequency of bowel movements: Once a day and then she wont for another week .  Consistency of stool: Mona   Progression of Symptoms: Had enema at the hospital and it helped   Accompanying signs and symptoms:    Abdominal pain: No   Rectal pain: No   Blood in stool: YES when she is really constipated    Nausea/Vomiting: No   Weight loss or gain: YES- but gradual per daughter  History:   Similar problems in past: YES  History of abdominal surgery: No  Chronic laxative use: YES  New medications: No  Precipitating or alleviating factors: None   Therapies tried and outcome:  "None    The patient is here for followup of constipation. She is accompanied by an adult female.    She got up in the morning and had a bowel movement. She felt something was coming out after she had a bowel movement and she pushed it back in and noticed bleeding.  She then went to the emergency department.  They felt she was constipated.  They noted she had internal and external hemorrhoids.  She had improvement with a fleets enema.  She still feels constipated. Her stools are hard and crusty. She was offered a rectal exam, but she declined. She takes senna when she does not have a bowel movement for a couple of days, but it does not work either. She drinks a lot of water as much as she can which she thinks is about 30 ounces a day. Her stomach did not hurt when she went to the ER.  She feels her constipation has been going on for years.    She is on venlafaxine for her mood. Since she moved into senior living, she has not been active.      Objective    BP (!) 144/86   Pulse 75   Temp 98.1  F (36.7  C) (Temporal)   Resp 16   Ht 1.549 m (5' 1\")   Wt 61.2 kg (135 lb)   SpO2 96%   BMI 25.51 kg/m    Body mass index is 25.51 kg/m .  Physical Exam   Gen: no apparent distress  Abd: The abdomen is soft without tenderness, guarding, mass, rebound or organomegaly. Bowel sounds are normal.   Psych: Mood is bright              "

## 2023-09-25 ENCOUNTER — DOCUMENTATION ONLY (OUTPATIENT)
Dept: OTHER | Facility: CLINIC | Age: 88
End: 2023-09-25
Payer: MEDICARE

## 2023-11-16 ENCOUNTER — MYC MEDICAL ADVICE (OUTPATIENT)
Dept: FAMILY MEDICINE | Facility: OTHER | Age: 88
End: 2023-11-16
Payer: MEDICARE

## 2023-11-16 DIAGNOSIS — G47.00 INSOMNIA, UNSPECIFIED TYPE: ICD-10-CM

## 2023-11-16 RX ORDER — TRAZODONE HYDROCHLORIDE 100 MG/1
TABLET ORAL
Qty: 90 TABLET | Refills: 3 | Status: SHIPPED | OUTPATIENT
Start: 2023-11-16 | End: 2023-12-19

## 2023-12-05 DIAGNOSIS — I10 HYPERTENSION, GOAL BELOW 150/90: ICD-10-CM

## 2023-12-06 RX ORDER — METOPROLOL SUCCINATE 100 MG/1
100 TABLET, EXTENDED RELEASE ORAL DAILY
Qty: 90 TABLET | Refills: 0 | Status: SHIPPED | OUTPATIENT
Start: 2023-12-06 | End: 2023-12-19

## 2023-12-19 ENCOUNTER — OFFICE VISIT (OUTPATIENT)
Dept: FAMILY MEDICINE | Facility: OTHER | Age: 88
End: 2023-12-19
Attending: FAMILY MEDICINE
Payer: MEDICARE

## 2023-12-19 VITALS
TEMPERATURE: 98 F | BODY MASS INDEX: 25.3 KG/M2 | DIASTOLIC BLOOD PRESSURE: 92 MMHG | WEIGHT: 134 LBS | HEART RATE: 70 BPM | OXYGEN SATURATION: 97 % | RESPIRATION RATE: 16 BRPM | HEIGHT: 61 IN | SYSTOLIC BLOOD PRESSURE: 164 MMHG

## 2023-12-19 DIAGNOSIS — N18.2 CHRONIC KIDNEY DISEASE, STAGE 2 (MILD): ICD-10-CM

## 2023-12-19 DIAGNOSIS — Z00.00 ENCOUNTER FOR MEDICARE ANNUAL WELLNESS EXAM: Primary | ICD-10-CM

## 2023-12-19 DIAGNOSIS — K21.9 GASTROESOPHAGEAL REFLUX DISEASE WITHOUT ESOPHAGITIS: ICD-10-CM

## 2023-12-19 DIAGNOSIS — G43.109 MIGRAINE WITH AURA AND WITHOUT STATUS MIGRAINOSUS, NOT INTRACTABLE: ICD-10-CM

## 2023-12-19 DIAGNOSIS — G47.00 INSOMNIA, UNSPECIFIED TYPE: ICD-10-CM

## 2023-12-19 DIAGNOSIS — E78.5 HYPERLIPIDEMIA, UNSPECIFIED HYPERLIPIDEMIA TYPE: ICD-10-CM

## 2023-12-19 DIAGNOSIS — E03.9 HYPOTHYROIDISM, UNSPECIFIED TYPE: ICD-10-CM

## 2023-12-19 DIAGNOSIS — I10 HYPERTENSION, GOAL BELOW 150/90: ICD-10-CM

## 2023-12-19 DIAGNOSIS — F33.1 MODERATE RECURRENT MAJOR DEPRESSION (H): ICD-10-CM

## 2023-12-19 DIAGNOSIS — K59.00 CONSTIPATION, UNSPECIFIED CONSTIPATION TYPE: ICD-10-CM

## 2023-12-19 LAB
ALBUMIN SERPL BCG-MCNC: 4.4 G/DL (ref 3.5–5.2)
ALP SERPL-CCNC: 97 U/L (ref 40–150)
ALT SERPL W P-5'-P-CCNC: 30 U/L (ref 0–50)
ANION GAP SERPL CALCULATED.3IONS-SCNC: 13 MMOL/L (ref 7–15)
AST SERPL W P-5'-P-CCNC: 36 U/L (ref 0–45)
BILIRUB SERPL-MCNC: 0.7 MG/DL
BUN SERPL-MCNC: 11.6 MG/DL (ref 8–23)
CALCIUM SERPL-MCNC: 9.2 MG/DL (ref 8.8–10.2)
CHLORIDE SERPL-SCNC: 103 MMOL/L (ref 98–107)
CHOLEST SERPL-MCNC: 192 MG/DL
CREAT SERPL-MCNC: 0.75 MG/DL (ref 0.51–0.95)
DEPRECATED HCO3 PLAS-SCNC: 26 MMOL/L (ref 22–29)
EGFRCR SERPLBLD CKD-EPI 2021: 77 ML/MIN/1.73M2
FASTING STATUS PATIENT QL REPORTED: YES
GLUCOSE SERPL-MCNC: 100 MG/DL (ref 70–99)
HDLC SERPL-MCNC: 66 MG/DL
HGB BLD-MCNC: 13.2 G/DL (ref 11.7–15.7)
LDLC SERPL CALC-MCNC: 85 MG/DL
NONHDLC SERPL-MCNC: 126 MG/DL
POTASSIUM SERPL-SCNC: 4.3 MMOL/L (ref 3.4–5.3)
PROT SERPL-MCNC: 7.2 G/DL (ref 6.4–8.3)
SODIUM SERPL-SCNC: 142 MMOL/L (ref 135–145)
TRIGL SERPL-MCNC: 206 MG/DL
TSH SERPL DL<=0.005 MIU/L-ACNC: 0.77 UIU/ML (ref 0.3–4.2)

## 2023-12-19 PROCEDURE — 99214 OFFICE O/P EST MOD 30 MIN: CPT | Mod: 25 | Performed by: FAMILY MEDICINE

## 2023-12-19 PROCEDURE — 80053 COMPREHEN METABOLIC PANEL: CPT | Performed by: FAMILY MEDICINE

## 2023-12-19 PROCEDURE — 84443 ASSAY THYROID STIM HORMONE: CPT | Performed by: FAMILY MEDICINE

## 2023-12-19 PROCEDURE — 85018 HEMOGLOBIN: CPT | Performed by: FAMILY MEDICINE

## 2023-12-19 PROCEDURE — 90480 ADMN SARSCOV2 VAC 1/ONLY CMP: CPT | Performed by: FAMILY MEDICINE

## 2023-12-19 PROCEDURE — G0439 PPPS, SUBSEQ VISIT: HCPCS | Performed by: FAMILY MEDICINE

## 2023-12-19 PROCEDURE — 80061 LIPID PANEL: CPT | Performed by: FAMILY MEDICINE

## 2023-12-19 PROCEDURE — 36415 COLL VENOUS BLD VENIPUNCTURE: CPT | Performed by: FAMILY MEDICINE

## 2023-12-19 PROCEDURE — 91320 SARSCV2 VAC 30MCG TRS-SUC IM: CPT | Performed by: FAMILY MEDICINE

## 2023-12-19 RX ORDER — LOSARTAN POTASSIUM 25 MG/1
25 TABLET ORAL DAILY
Qty: 60 TABLET | Refills: 0 | Status: SHIPPED | OUTPATIENT
Start: 2023-12-19 | End: 2024-01-23

## 2023-12-19 RX ORDER — ESCITALOPRAM OXALATE 10 MG/1
10 TABLET ORAL DAILY
Qty: 60 TABLET | Refills: 0 | Status: SHIPPED | OUTPATIENT
Start: 2023-12-19 | End: 2024-02-16

## 2023-12-19 RX ORDER — TRAZODONE HYDROCHLORIDE 100 MG/1
TABLET ORAL
Qty: 90 TABLET | Refills: 3 | Status: SHIPPED | OUTPATIENT
Start: 2023-12-19

## 2023-12-19 RX ORDER — METOPROLOL SUCCINATE 100 MG/1
100 TABLET, EXTENDED RELEASE ORAL DAILY
Qty: 90 TABLET | Refills: 3 | Status: SHIPPED | OUTPATIENT
Start: 2023-12-19 | End: 2024-03-26

## 2023-12-19 RX ORDER — LEVOTHYROXINE SODIUM 88 UG/1
88 TABLET ORAL DAILY
Qty: 90 TABLET | Refills: 3 | Status: SHIPPED | OUTPATIENT
Start: 2023-12-19

## 2023-12-19 RX ORDER — VENLAFAXINE HYDROCHLORIDE 37.5 MG/1
37.5 CAPSULE, EXTENDED RELEASE ORAL DAILY
Qty: 60 CAPSULE | Refills: 0 | Status: SHIPPED | OUTPATIENT
Start: 2023-12-19 | End: 2024-01-23

## 2023-12-19 RX ORDER — SIMVASTATIN 20 MG
20 TABLET ORAL AT BEDTIME
Qty: 90 TABLET | Refills: 3 | Status: SHIPPED | OUTPATIENT
Start: 2023-12-19

## 2023-12-19 RX ORDER — SUMATRIPTAN 25 MG/1
25-50 TABLET, FILM COATED ORAL
Qty: 18 TABLET | Refills: 3 | Status: SHIPPED | OUTPATIENT
Start: 2023-12-19

## 2023-12-19 RX ORDER — RESPIRATORY SYNCYTIAL VIRUS VACCINE 120MCG/0.5
0.5 KIT INTRAMUSCULAR ONCE
Qty: 1 EACH | Refills: 0 | Status: CANCELLED | OUTPATIENT
Start: 2023-12-19 | End: 2023-12-19

## 2023-12-19 RX ORDER — AMOXICILLIN 250 MG
2 CAPSULE ORAL DAILY
Qty: 180 TABLET | Refills: 3 | Status: SHIPPED | OUTPATIENT
Start: 2023-12-19

## 2023-12-19 ASSESSMENT — PAIN SCALES - GENERAL: PAINLEVEL: SEVERE PAIN (6)

## 2023-12-19 ASSESSMENT — ENCOUNTER SYMPTOMS
HEMATOCHEZIA: 0
EYE PAIN: 0
COUGH: 0
HEADACHES: 1
SORE THROAT: 0
ABDOMINAL PAIN: 0
WEAKNESS: 0
DIARRHEA: 0
NAUSEA: 0
CHILLS: 0
JOINT SWELLING: 0
MYALGIAS: 0
PALPITATIONS: 0
DIZZINESS: 0
NERVOUS/ANXIOUS: 1
FREQUENCY: 0
CONSTIPATION: 0
FEVER: 0
DYSURIA: 0
HEMATURIA: 0
HEARTBURN: 0
ARTHRALGIAS: 0
BREAST MASS: 0
SHORTNESS OF BREATH: 0
PARESTHESIAS: 0

## 2023-12-19 ASSESSMENT — PATIENT HEALTH QUESTIONNAIRE - PHQ9
SUM OF ALL RESPONSES TO PHQ QUESTIONS 1-9: 2
SUM OF ALL RESPONSES TO PHQ QUESTIONS 1-9: 2
10. IF YOU CHECKED OFF ANY PROBLEMS, HOW DIFFICULT HAVE THESE PROBLEMS MADE IT FOR YOU TO DO YOUR WORK, TAKE CARE OF THINGS AT HOME, OR GET ALONG WITH OTHER PEOPLE: NOT DIFFICULT AT ALL

## 2023-12-19 ASSESSMENT — ACTIVITIES OF DAILY LIVING (ADL)
CURRENT_FUNCTION: TRANSPORTATION REQUIRES ASSISTANCE
CURRENT_FUNCTION: SHOPPING REQUIRES ASSISTANCE

## 2023-12-19 NOTE — PROGRESS NOTES
"SUBJECTIVE:   Erwin is a 88 year old, presenting for the following:  Wellness Visit        12/19/2023    10:43 AM   Additional Questions   Roomed by mayra   Accompanied by daughter         12/19/2023    10:43 AM   Patient Reported Additional Medications   Patient reports taking the following new medications none       Are you in the first 12 months of your Medicare coverage?  No    Healthy Habits:     In general, how would you rate your overall health?  Good    Frequency of exercise:  None    Do you usually eat at least 4 servings of fruit and vegetables a day, include whole grains    & fiber and avoid regularly eating high fat or \"junk\" foods?  Yes    Taking medications regularly:  Yes    Ability to successfully perform activities of daily living:  Transportation requires assistance and shopping requires assistance    Home Safety:  No safety concerns identified    Hearing Impairment:  No hearing concerns    In the past 6 months, have you been bothered by leaking of urine?  No    In general, how would you rate your overall mental or emotional health?  Good    Additional concerns today:  Yes      Today's PHQ-9 Score:       12/19/2023    10:33 AM   PHQ-9 SCORE   PHQ-9 Total Score MyChart 2 (Minimal depression)   PHQ-9 Total Score 2           Have you ever done Advance Care Planning? (For example, a Health Directive, POLST, or a discussion with a medical provider or your loved ones about your wishes): Yes, advance care planning is on file.       Fall risk  Fallen 2 or more times in the past year?: Yes  Any fall with injury in the past year?: Yes    Cognitive Screening   1) Repeat 3 items (Leader, Season, Table)    2) Clock draw: NORMAL  3) 3 item recall: Recalls 3 objects  Results: 3 items recalled: COGNITIVE IMPAIRMENT LESS LIKELY    Mini-CogTM Copyright KAYLIE Zamora. Licensed by the author for use in [facility]; reprinted with permission (jerson@.[facility]). All rights reserved.          Reviewed and updated as " needed this visit by clinical staff   Tobacco  Allergies  Meds  Problems  Med Hx  Surg Hx  Fam Hx          Reviewed and updated as needed this visit by Provider   Tobacco  Allergies  Meds  Problems  Med Hx  Surg Hx  Fam Hx         Social History     Tobacco Use    Smoking status: Former     Packs/day: 0.50     Years: 10.00     Additional pack years: 0.00     Total pack years: 5.00     Types: Cigarettes     Quit date: 1966     Years since quittin.0    Smokeless tobacco: Never    Tobacco comments:     no smokers in household   Substance Use Topics    Alcohol use: No     Alcohol/week: 1.7 standard drinks of alcohol     Comment: beerx2/ x8per month             2023    10:38 AM   Alcohol Use   Prescreen: >3 drinks/day or >7 drinks/week? No     Do you have a current opioid prescription? No  Do you use any other controlled substances or medications that are not prescribed by a provider? None    Current providers sharing in care for this patient include:   Patient Care Team:  Jacqueline Brandt MD as PCP - General  Jacqueline Brandt MD as Assigned PCP    The following health maintenance items are reviewed in Epic and correct as of today:  Health Maintenance   Topic Date Due    RSV VACCINE (Pregnancy & 60+) (1 - 1-dose 60+ series) Never done    ZOSTER IMMUNIZATION (2 of 3) 2013    COVID-19 Vaccine (4 - - season) 2023    DTAP/TDAP/TD IMMUNIZATION (2 - Td or Tdap) 2023    BMP  2023    CMP  2023    LIPID  2023    MICROALBUMIN  2023    ANNUAL REVIEW OF HM ORDERS  2023    TSH W/FREE T4 REFLEX  2023    HEMOGLOBIN  2023    PHQ-9  2024    MEDICARE ANNUAL WELLNESS VISIT  2024    FALL RISK ASSESSMENT  2024    ADVANCE CARE PLANNING  2028    DEPRESSION ACTION PLAN  Completed    INFLUENZA VACCINE  Completed    Pneumococcal Vaccine: 65+ Years  Completed    URINALYSIS  Completed    IPV IMMUNIZATION  Aged Out     "HPV IMMUNIZATION  Aged Out    MENINGITIS IMMUNIZATION  Aged Out    RSV MONOCLONAL ANTIBODY  Aged Out       Review of Systems   Constitutional:  Negative for chills and fever.   HENT:  Positive for ear pain and hearing loss. Negative for congestion and sore throat.    Eyes:  Positive for visual disturbance. Negative for pain.   Respiratory:  Negative for cough and shortness of breath.    Cardiovascular:  Negative for chest pain, palpitations and peripheral edema.   Gastrointestinal:  Negative for abdominal pain, constipation, diarrhea, heartburn, hematochezia and nausea.   Breasts:  Negative for tenderness, breast mass and discharge.   Genitourinary:  Negative for dysuria, frequency, genital sores, hematuria, pelvic pain, urgency, vaginal bleeding and vaginal discharge.   Musculoskeletal:  Negative for arthralgias, joint swelling and myalgias.   Skin:  Negative for rash.   Neurological:  Positive for headaches. Negative for dizziness, weakness and paresthesias.   Psychiatric/Behavioral:  Negative for mood changes. The patient is nervous/anxious.        OBJECTIVE:   BP (!) 164/92   Pulse 70   Temp 98  F (36.7  C) (Temporal)   Resp 16   Ht 1.556 m (5' 1.26\")   Wt 60.8 kg (134 lb)   SpO2 97%   BMI 25.10 kg/m   Estimated body mass index is 25.1 kg/m  as calculated from the following:    Height as of this encounter: 1.556 m (5' 1.26\").    Weight as of this encounter: 60.8 kg (134 lb).  Physical Exam  Constitutional:       General: She is not in acute distress.     Appearance: She is well-developed.   HENT:      Right Ear: Tympanic membrane and external ear normal.      Left Ear: Tympanic membrane and external ear normal.      Nose: Nose normal.   Eyes:      General:         Right eye: No discharge.         Left eye: No discharge.      Conjunctiva/sclera: Conjunctivae normal.      Pupils: Pupils are equal, round, and reactive to light.   Neck:      Thyroid: No thyroid mass.   Cardiovascular:      Rate and Rhythm: " Normal rate and regular rhythm.      Heart sounds: Normal heart sounds, S1 normal and S2 normal. No murmur heard.  Pulmonary:      Effort: Pulmonary effort is normal. No respiratory distress.      Breath sounds: Normal breath sounds. No wheezing or rales.   Abdominal:      General: Bowel sounds are normal.      Palpations: Abdomen is soft. There is no mass.      Tenderness: There is no abdominal tenderness.   Musculoskeletal:         General: Normal range of motion.      Cervical back: Neck supple.   Lymphadenopathy:      Cervical: No cervical adenopathy.   Skin:     General: Skin is warm and dry.      Findings: No rash.   Neurological:      Mental Status: She is alert and oriented to person, place, and time.   Psychiatric:         Mood and Affect: Mood normal.         Thought Content: Thought content normal.         Judgment: Judgment normal.         ASSESSMENT / PLAN:   (Z00.00) Encounter for Medicare annual wellness exam  (primary encounter diagnosis)  Comment: She has had 1 week of intermittent sharp pain near her left ear.  She can have this a few times a day.  She states she has had some dental problems in this area but that started about 6 months ago.  Consider possible trigeminal neuralgia.  At this time with our changes with her blood pressure and her mood medications we decided not to treat this pain.  We will reevaluate in a month to see if she is still having the symptoms.    (I10) Hypertension, goal below 150/90  Comment: Her blood pressure is poorly controlled.  Her blood pressure actually worsened on recheck.  Discussed that venlafaxine could be contributing and we will continue to decrease this.  Will add a medication to her metoprolol.  We discussed lisinopril but the daughter was concerned about chronic cough that she and other family members have developed when using lisinopril.  Therefore we will start losartan.  Will have her follow-up in a month.  Continue metoprolol without change.    Plan:  metoprolol succinate ER (TOPROL XL) 100 MG 24         hr tablet, losartan (COZAAR) 25 MG tablet          (K21.9) Gastroesophageal reflux disease without esophagitis  Comment: Controlled with omeprazole.  Refills given.    Plan: omeprazole (PRILOSEC) 20 MG DR capsule          (K59.00) Constipation, unspecified constipation type  Comment: She finds the senna has been very helpful.  Refills given.    Plan: senna-docusate (SENOKOT-S/PERICOLACE) 8.6-50 MG        tablet          (E78.5) Hyperlipidemia, unspecified hyperlipidemia type  Comment: Continues on statin.  Labs drawn.  Refills given.    Plan: Lipid panel reflex to direct LDL Non-fasting,         simvastatin (ZOCOR) 20 MG tablet          (G43.109) Migraine with aura and without status migrainosus, not intractable  Comment: She finds Imitrex helpful when she uses it.  The last 9 pills she had filled was about 4 months ago.  Refills given.    Plan: SUMAtriptan (IMITREX) 25 MG tablet          (G47.00) Insomnia, unspecified type  Comment: She finds trazodone effective for sleep.  Refills given.    Plan: traZODone (DESYREL) 100 MG tablet          (F33.1) Moderate recurrent major depression (H)  Comment: We discussed that the venlafaxine could be contributing to her constipation as well as her blood pressure.  Will decrease her venlafaxine from 75 mg to 37.5 mg.  Patient also endorses worsening mood symptoms since decreasing her venlafaxine in the past.  Therefore we will start Lexapro 10 mg daily.  Will recheck in a month and at that time see if we can stop the venlafaxine or perhaps go to every other day for a while.    Plan: venlafaxine (EFFEXOR XR) 37.5 MG 24 hr capsule,        escitalopram (LEXAPRO) 10 MG tablet          (N18.2) Chronic kidney disease, stage 2 (mild)  Comment: At this time recommend getting her blood pressure controlled.  Also added losartan.    Plan: COMPREHENSIVE METABOLIC PANEL, Albumin Random         Urine Quantitative with Creat Ratio,  "Hemoglobin          (E03.9) Hypothyroidism, unspecified type  Comment: Labs drawn.  Refills given.    Plan: TSH WITH FREE T4 REFLEX, levothyroxine         (SYNTHROID/LEVOTHROID) 88 MCG tablet          COUNSELING:  Reviewed preventive health counseling, as reflected in patient instructions      BMI:   Estimated body mass index is 25.1 kg/m  as calculated from the following:    Height as of this encounter: 1.556 m (5' 1.26\").    Weight as of this encounter: 60.8 kg (134 lb).         She reports that she quit smoking about 58 years ago. She has a 5.00 pack-year smoking history. She has never used smokeless tobacco.      Appropriate preventive services were discussed with this patient, including applicable screening as appropriate for fall prevention, nutrition, physical activity, Tobacco-use cessation, weight loss and cognition.  Checklist reviewing preventive services available has been given to the patient.    Reviewed patients plan of care and provided an AVS. The Basic Care Plan (routine screening as documented in Health Maintenance) for Erwin meets the Care Plan requirement. This Care Plan has been established and reviewed with the Patient.      Jacqueline Brandt MD  [facility]    Identified Health Risks:    Answers submitted by the patient for this visit:  Patient Health Questionnaire (Submitted on 12/19/2023)  If you checked off any problems, how difficult have these problems made it for you to do your work, take care of things at home, or get along with other people?: Not difficult at all  PHQ9 TOTAL SCORE: 2  She is at risk for lack of exercise and has been provided with information to increase physical activity for the benefit of her well-being.  The patient reports that she has difficulty with activities of daily living. She already has assistance with transportation and shopping.    She is at risk for falling and has been provided with information to reduce the risk of falling " at home.

## 2023-12-19 NOTE — PATIENT INSTRUCTIONS
"Patient Education   Personalized Prevention Plan  You are due for the preventive services outlined below.  Your care team is available to assist you in scheduling these services.  If you have already completed any of these items, please share that information with your care team to update in your medical record.  Health Maintenance Due   Topic Date Due     RSV VACCINE (Pregnancy & 60+) (1 - 1-dose 60+ series) Never done     Zoster (Shingles) Vaccine (2 of 3) 12/27/2013     COVID-19 Vaccine (4 - 2023-24 season) 09/01/2023     Diptheria Tetanus Pertussis (DTAP/TDAP/TD) Vaccine (2 - Td or Tdap) 11/01/2023     Basic Metabolic Panel  12/06/2023     Comprehensive Metabolic Panel  12/06/2023     Cholesterol Lab  12/06/2023     Kidney Microalbumin Urine Test  12/06/2023     ANNUAL REVIEW OF HM ORDERS  12/06/2023     Thyroid Function Lab  12/06/2023     Hemoglobin  12/06/2023     Learning About Being Physically Active  What is physical activity?     Being physically active means doing any kind of activity that gets your body moving.  The types of physical activity that can help you get fit and stay healthy include:  Aerobic or \"cardio\" activities. These make your heart beat faster and make you breathe harder, such as brisk walking, riding a bike, or running. They strengthen your heart and lungs and build up your endurance.  Strength training activities. These make your muscles work against, or \"resist,\" something. Examples include lifting weights or doing push-ups. These activities help tone and strengthen your muscles and bones.  Stretches. These let you move your joints and muscles through their full range of motion. Stretching helps you be more flexible.  Reaching a balance between these three types of physical activity is important because each one contributes to your overall fitness.  What are the benefits of being active?  Being active is one of the best things you can do for your health. It helps you to:  Feel " "stronger and have more energy to do all the things you like to do.  Focus better at school or work.  Feel, think, and sleep better.  Reach and stay at a healthy weight.  Lose fat and build lean muscle.  Lower your risk for serious health problems, including diabetes, heart attack, high blood pressure, and some cancers.  Keep your heart, lungs, bones, muscles, and joints strong and healthy.  How can you make being active part of your life?  Start slowly. Make it your long-term goal to get at least 30 minutes of exercise on most days of the week. Walking is a good choice. You also may want to do other activities, such as running, swimming, cycling, or playing tennis or team sports.  Pick activities that you like--ones that make your heart beat faster, your muscles stronger, and your muscles and joints more flexible. If you find more than one thing you like doing, do them all. You don't have to do the same thing every day.  Get your heart pumping every day. Any activity that makes your heart beat faster and keeps it at that rate for a while counts.  Here are some great ways to get your heart beating faster:  Go for a brisk walk, run, or hike.  Go for a swim or bike ride.  Take an online exercise class or dance.  Play a game of touch football, basketball, or soccer.  Play tennis, pickleball, or racquetball.  Climb stairs.  Even some household chores can be aerobic. Just do them at a faster pace. Raking or mowing the lawn, sweeping the garage, and vacuuming and cleaning your home all can help get your heart rate up.  Strengthen your muscles during the week. You don't have to lift heavy weights or grow big, bulky muscles to get stronger. Doing a few simple activities that make your muscles work against, or \"resist,\" something can help you get stronger. Aim for at least twice a week.  For example, you can:  Do push-ups or sit-ups, which use your own body weight as resistance.  Lift weights or dumbbells or use stretch bands " "at home or in a gym or community center.  Stretch your muscles often. Stretching will help you as you become more active. It can help you stay flexible and loosen tight muscles. It can also help improve your balance and posture and can be a great way to relax.  Be sure to stretch the muscles you'll be using when you work out. It's best to warm your muscles slightly before you stretch them. Walk or do some other light aerobic activity for a few minutes. Then start stretching.  When you stretch your muscles:  Do it slowly. Stretching is not about going fast or making sudden movements.  Don't push or bounce during a stretch.  Hold each stretch for at least 15 to 30 seconds, if you can. You should feel a stretch in the muscle, but not pain.  Breathe out as you do the stretch. Then breathe in as you hold the stretch. Don't hold your breath.  If you're worried about how more activity might affect your health, have a checkup before you start. Follow any special advice your doctor gives you for getting a smart start.  Where can you learn more?  Go to https://www.[employer].net/patiented  Enter W332 in the search box to learn more about \"Learning About Being Physically Active.\"  Current as of: June 6, 2023               Content Version: 13.8    9576-6437 [employer].   Care instructions adapted under license by your healthcare professional. If you have questions about a medical condition or this instruction, always ask your healthcare professional. [employer] disclaims any warranty or liability for your use of this information.      Activities of Daily Living    Your Health Risk Assessment indicates you have difficulties with activities of daily living such as housework, bathing, preparing meals, taking medication, etc. Please make a follow up appointment for us to address this issue in more detail.  Preventing Falls: Care Instructions  Injuries and health problems such as trouble walking or poor " eyesight can increase your risk of falling. So can some medicines. But there are things you can do to help prevent falls. You can exercise to get stronger. You can also arrange your home to make it safer.    Talk to your doctor about the medicines you take. Ask if any of them increase the risk of falls and whether they can be changed or stopped.   Try to exercise regularly. It can help improve your strength and balance. This can help lower your risk of falling.     Practice fall safety and prevention.    Wear low-heeled shoes that fit well and give your feet good support. Talk to your doctor if you have foot problems that make this hard.  Carry a cellphone or wear a medical alert device that you can use to call for help.  Use stepladders instead of chairs to reach high objects. Don't climb if you're at risk for falls. Ask for help, if needed.  Wear the correct eyeglasses, if you need them.    Make your home safer.    Remove rugs, cords, clutter, and furniture from walkways.  Keep your house well lit. Use night-lights in hallways and bathrooms.  Install and use sturdy handrails on stairways.  Wear nonskid footwear, even inside. Don't walk barefoot or in socks without shoes.    Be safe outside.    Use handrails, curb cuts, and ramps whenever possible.  Keep your hands free by using a shoulder bag or backpack.  Try to walk in well-lit areas. Watch out for uneven ground, changes in pavement, and debris.  Be careful in the winter. Walk on the grass or gravel when sidewalks are slippery. Use de-icer on steps and walkways. Add non-slip devices to shoes.    Put grab bars and nonskid mats in your shower or tub and near the toilet. Try to use a shower chair or bath bench when bathing.   Get into a tub or shower by putting in your weaker leg first. Get out with your strong side first. Have a phone or medical alert device in the bathroom with you.   Where can you learn more?  Go to https://www.healthwise.net/patiented  Enter  "G117 in the search box to learn more about \"Preventing Falls: Care Instructions.\"  Current as of: July 18, 2023               Content Version: 13.8    4985-9235 [employer].   Care instructions adapted under license by your healthcare professional. If you have questions about a medical condition or this instruction, always ask your healthcare professional. [employer] disclaims any warranty or liability for your use of this information.      How to Get Up Safely After a Fall: Care Instructions  Overview     If you have injuries, health problems, or other reasons that may make it easy for you to fall at home, it is a good idea to learn how to get up safely after a fall. Learning how to get up correctly can help you avoid making an injury worse.  Also, knowing what to do if you cannot get up can help you stay safe until help arrives.  Follow-up care is a key part of your treatment and safety. Be sure to make and go to all appointments, and call your doctor if you are having problems. It's also a good idea to know your test results and keep a list of the medicines you take.  How can you care for yourself after a fall?  If you think you can get up  First lie still for a few minutes and think about how you feel. If your body feels okay and you think you can get up safely, follow the rest of the steps below:  Look for a chair or other piece of furniture that is close to you.  Roll onto your side and rest. Roll by turning your head in the direction you want to roll, move your shoulder and arm, then hip and leg in the same direction.  Lie still for a moment to let your blood pressure adjust.  Slowly push your upper body up, lift your head, and take a moment to rest.  Slowly get up on your hands and knees, and crawl to the chair or other stable piece of furniture.  Put your hands on the chair.  Move one foot forward, and place it flat on the floor. Your other leg should be bent with the knee on the " "floor.  Rise slowly, turn your body, and sit in the chair. Stay seated for a bit and think about how you feel. Call for help. Even if you feel okay, let someone know what happened to you. You might not know that you have a serious injury.  If you cannot get up  If you think you are injured after a fall or you cannot get up, try not to panic.  Call out for help.  If you have a phone within reach or you have an emergency call device, use it to call for help.  If you do not have a phone within reach, try to slide yourself toward it. If you cannot get to the phone, try to slide toward a door or window or a place where you think you can be heard.  [place] or use an object to make noise so someone might hear you.  If you can reach something that you can use for a pillow, place it under your head. Try to stay warm by covering yourself with a blanket or clothing while you wait for help.  When should you call for help?   Call 911 anytime you think you may need emergency care. For example, call if:    You passed out (lost consciousness).     You cannot get up after a fall.     You have severe pain.   Call your doctor now or seek immediate medical care if:    You have new or worse pain.     You are dizzy or lightheaded.     You hit your head.   Watch closely for changes in your health, and be sure to contact your doctor if:    You do not get better as expected.   Where can you learn more?  Go to https://www.[employer].net/patiented  Enter G513 in the search box to learn more about \"How to Get Up Safely After a Fall: Care Instructions.\"  Current as of: November 13, 2022               Content Version: 13.8    7779-1198 [employer].   Care instructions adapted under license by your healthcare professional. If you have questions about a medical condition or this instruction, always ask your healthcare professional. [employer] disclaims any warranty or liability for your use of this information.         "

## 2024-01-23 ENCOUNTER — OFFICE VISIT (OUTPATIENT)
Dept: FAMILY MEDICINE | Facility: OTHER | Age: 89
End: 2024-01-23
Attending: FAMILY MEDICINE
Payer: MEDICARE

## 2024-01-23 VITALS
DIASTOLIC BLOOD PRESSURE: 88 MMHG | OXYGEN SATURATION: 96 % | WEIGHT: 135 LBS | SYSTOLIC BLOOD PRESSURE: 152 MMHG | BODY MASS INDEX: 25.29 KG/M2 | HEART RATE: 77 BPM | TEMPERATURE: 97.4 F | RESPIRATION RATE: 18 BRPM

## 2024-01-23 DIAGNOSIS — F33.1 MODERATE RECURRENT MAJOR DEPRESSION (H): ICD-10-CM

## 2024-01-23 DIAGNOSIS — K13.0 LIP LESION: ICD-10-CM

## 2024-01-23 DIAGNOSIS — I10 HYPERTENSION, GOAL BELOW 150/90: Primary | ICD-10-CM

## 2024-01-23 PROCEDURE — 99214 OFFICE O/P EST MOD 30 MIN: CPT | Performed by: FAMILY MEDICINE

## 2024-01-23 RX ORDER — RESPIRATORY SYNCYTIAL VIRUS VACCINE 120MCG/0.5
0.5 KIT INTRAMUSCULAR ONCE
Qty: 1 EACH | Refills: 0 | Status: CANCELLED | OUTPATIENT
Start: 2024-01-23 | End: 2024-01-23

## 2024-01-23 RX ORDER — VENLAFAXINE HYDROCHLORIDE 37.5 MG/1
37.5 CAPSULE, EXTENDED RELEASE ORAL EVERY OTHER DAY
Start: 2024-01-23 | End: 2024-03-26

## 2024-01-23 RX ORDER — LOSARTAN POTASSIUM 50 MG/1
50 TABLET ORAL DAILY
Qty: 60 TABLET | Refills: 0 | Status: SHIPPED | OUTPATIENT
Start: 2024-01-23 | End: 2024-03-21

## 2024-01-23 ASSESSMENT — PATIENT HEALTH QUESTIONNAIRE - PHQ9
SUM OF ALL RESPONSES TO PHQ QUESTIONS 1-9: 1
SUM OF ALL RESPONSES TO PHQ QUESTIONS 1-9: 1
10. IF YOU CHECKED OFF ANY PROBLEMS, HOW DIFFICULT HAVE THESE PROBLEMS MADE IT FOR YOU TO DO YOUR WORK, TAKE CARE OF THINGS AT HOME, OR GET ALONG WITH OTHER PEOPLE: NOT DIFFICULT AT ALL

## 2024-01-23 ASSESSMENT — PAIN SCALES - GENERAL: PAINLEVEL: NO PAIN (0)

## 2024-01-23 NOTE — PROGRESS NOTES
Assessment & Plan     Hypertension, goal below 150/90  Diastolic is slightly better than previous.  Will increase her losartan from 25 mg to 50 mg daily.  Will follow-up in 6 to 8 weeks.  Will be due for basic metabolic panel at that time.    - losartan (COZAAR) 50 MG tablet; Take 1 tablet (50 mg) by mouth daily    Moderate recurrent major depression (H)  Will continue her Lexapro 10 mg daily.  Will decrease her Effexor to 37.5 mg every other day for 1 to 2 weeks and then she will stop.  Will place referral for counseling as she is having difficulty with end-of-life thoughts and feelings.    - Adult Mental Health  Referral; Future  - venlafaxine (EFFEXOR XR) 37.5 MG 24 hr capsule; Take 1 capsule (37.5 mg) by mouth every other day for 14 days    Lip lesion  Discussed that I am not clear whether this is a lesion that could be cauterized or not.  Will refer to ENT.  Also discussed that with winter and dry air that it could be exacerbated by dryness.  Recommended using Vaseline on her lips specially at night.    - Adult ENT  Referral; Future    Emily Hood is a 88 year old, presenting for the following health issues:  Hypertension    History of Present Illness       Hypertension: She presents for follow up of hypertension.  She does not check blood pressure  regularly outside of the clinic. Outside blood pressures have been over 140/90. She does not follow a low salt diet.     She eats 2-3 servings of fruits and vegetables daily.She consumes 1 sweetened beverage(s) daily.She exercises with enough effort to increase her heart rate 9 or less minutes per day.  She exercises with enough effort to increase her heart rate 3 or less days per week.   She is taking medications regularly.     She denies chest pain or shortness of breath.    She feels her mood is okay.  At times she feels a little emotional.  Her daughter actually feels that she has been better and been more joking and lively on her  text messages.  Patient does get very tearful when she states that she gets quite sad when she thinks about where she is living now is going to be the place that she will eventually die at.  She states this makes her feel hopeless.    She also states since I saw her last month that she has a blood vessel in her lower lip that bleeds spontaneously antiquing of blood on her shirt.  She is wondering if this can be cauterized.        Objective    BP (!) 152/88   Pulse 77   Temp 97.4  F (36.3  C) (Temporal)   Resp 18   Wt 61.2 kg (135 lb)   SpO2 96%   BMI 25.29 kg/m    Body mass index is 25.29 kg/m .  Physical Exam   Gen: no apparent distress  Lip: Center of her lower lip there is a dark lesion consistent with vascular lesion or vessel  Chest: clear to auscultation without wheeze, rale or rhonchi  Cor: regular rate and rhythm without murmur  Ext: warm and dry without edema  Psych: Alert and oriented times 3; coherent speech, normal   rate and volume, able to articulate logical thoughts, able   to abstract reason, no tangential thoughts, no hallucinations   or delusions  Her affect is neutral        Signed Electronically by: Jacqueline Brandt MD

## 2024-01-29 ENCOUNTER — NURSE TRIAGE (OUTPATIENT)
Dept: FAMILY MEDICINE | Facility: OTHER | Age: 89
End: 2024-01-29
Payer: MEDICARE

## 2024-01-29 NOTE — TELEPHONE ENCOUNTER
Nurse Triage SBAR    Is this a 2nd Level Triage? NO    Situation: daughter calling as patient has elevated BP and palpitations    Background: appointment 1/23/24 with PCP losartan increased to 50mg at that time. Also patient began to wean off her Effexor. Patient noted palpitations over the weekend and elevated BP. Stopped taking the increased dose of Losartan and only took 25mg thinking that was the problem. Took Metoprolol 100mg as prescribed    Assessment: this morning patients /98 one hour after taking 100mg Metoprolol and Losartan 25mg. Heart rate noted to be 120 then. Now /103 with heart rate of 109.Daughter took BP on other arm  192/145. Patient feeling palpitations and shortness of breath with exertion. Generalized weakness.       Protocol Recommended Disposition:   Go To ED/UCC Now (Or To Office With PCP Approval)    Recommendation: daughter to bring patient to UC now         Does the patient meet one of the following criteria for ADS visit consideration? No    Reason for Disposition   New or worsened shortness of breath with activity (dyspnea on exertion)    Additional Information   Negative: Passed out (i.e., lost consciousness, collapsed and was not responding)   Negative: Shock suspected (e.g., cold/pale/clammy skin, too weak to stand, low BP, rapid pulse)   Negative: Difficult to awaken or acting confused (e.g., disoriented, slurred speech)   Negative: Visible sweat on face or sweat dripping down face   Negative: Unable to walk, or can only walk with assistance (e.g., requires support)   Negative: Received SHOCK from implantable cardiac defibrillator and has persisting symptoms (i.e., palpitations, lightheadedness)   Negative: Dizziness, lightheadedness, or weakness and heart beating very rapidly (e.g., > 140 / minute)   Negative: Dizziness, lightheadedness, or weakness and heart beating very slowly (e.g., < 50 / minute)   Negative: Sounds like a life-threatening emergency to the  triager   Negative: Chest pain   Negative: Difficulty breathing   Negative: Dizziness, lightheadedness, or weakness   Negative: Heart beating very rapidly (e.g., > 140 / minute) and present now  (Exception: During exercise.)   Negative: Heart beating very slowly (e.g., < 50 / minute)  (Exception: Athlete and heart rate normal for caller.)    Protocols used: Heart Rate and Heartbeat [id number]

## 2024-01-30 ENCOUNTER — TELEPHONE (OUTPATIENT)
Dept: FAMILY MEDICINE | Facility: OTHER | Age: 89
End: 2024-01-30
Payer: MEDICARE

## 2024-01-30 NOTE — TELEPHONE ENCOUNTER
Daughter Magaly calling ( patient gave consent to speak with her) stating they went to  as recommended yesterday for elevated BP and heart rate and having palpitations.  sent her to ED. Per daughter: they had abnormal EKG and elevated Troponin. Patient ended up leaving the ED before follow up labs could be completed. Patient took her metoprolol (that she had not been taking) and now is feeling better.   Daughter wondering if/when patient should be seen and if she should have follow up lab work and EKG   Please advise    Call back jose alfredo Mckenzie with recommendations

## 2024-01-30 NOTE — TELEPHONE ENCOUNTER
Relayed MD message to daughter. She expressed understanding. Scheduled patient to see MD tomorrow with 11:10 arrival time. Reviewed red flag symptoms and when patient should go to ED. Jacqui Ceja RN on 1/30/2024 at 2:09 PM

## 2024-01-30 NOTE — TELEPHONE ENCOUNTER
Elevated troponins are concerning for possible heart attack, although it could be related to hypertensive emergency from elevated blood pressure.  OK to see in provider only spot, but if patient is having any chest pain, shortness of breath or weakness, I would advise to go back to the ED.

## 2024-01-31 ENCOUNTER — OFFICE VISIT (OUTPATIENT)
Dept: FAMILY MEDICINE | Facility: OTHER | Age: 89
End: 2024-01-31
Payer: MEDICARE

## 2024-01-31 VITALS
RESPIRATION RATE: 16 BRPM | OXYGEN SATURATION: 94 % | SYSTOLIC BLOOD PRESSURE: 138 MMHG | TEMPERATURE: 97.9 F | DIASTOLIC BLOOD PRESSURE: 80 MMHG | HEART RATE: 74 BPM

## 2024-01-31 DIAGNOSIS — I10 HYPERTENSION, GOAL BELOW 150/90: Primary | ICD-10-CM

## 2024-01-31 LAB — TROPONIN T SERPL HS-MCNC: 18 NG/L

## 2024-01-31 PROCEDURE — 84484 ASSAY OF TROPONIN QUANT: CPT | Performed by: FAMILY MEDICINE

## 2024-01-31 PROCEDURE — 36415 COLL VENOUS BLD VENIPUNCTURE: CPT | Performed by: FAMILY MEDICINE

## 2024-01-31 PROCEDURE — 99214 OFFICE O/P EST MOD 30 MIN: CPT | Performed by: FAMILY MEDICINE

## 2024-01-31 RX ORDER — RESPIRATORY SYNCYTIAL VIRUS VACCINE 120MCG/0.5
0.5 KIT INTRAMUSCULAR ONCE
Qty: 1 EACH | Refills: 0 | Status: CANCELLED | OUTPATIENT
Start: 2024-01-31 | End: 2024-01-31

## 2024-01-31 ASSESSMENT — PAIN SCALES - GENERAL: PAINLEVEL: NO PAIN (0)

## 2024-01-31 NOTE — PROGRESS NOTES
Assessment & Plan     Hypertension, goal below 150/90  Patient's blood pressure is now controlled since she has been taking both her losartan and her metoprolol.  She is feeling back to normal.  We discussed her elevated troponins and at this time suspect this was secondary to her elevated blood pressure.  Discussed that I cannot tell her for certain that she did not have a NSTEMI and we could consider cardiology consult and/or stress testing.  Patient does not want to have further evaluation.  She is tearful over everything that she has been going through.  Her daughter agrees.  We discussed rechecking a troponin to assure that it has been trending down.  If patient changes her mind and wishes to visit with cardiology she will let me know.    - Troponin T, High Sensitivity      Subjective   Erwin is a 88 year old, presenting for the following health issues:  er follow up       1/31/2024    11:16 AM   Additional Questions   Roomed by Yolanda CAPONE   Accompanied by Daughter     History of Present Illness       Hypertension: She presents for follow up of hypertension.  She does not check blood pressure  regularly outside of the clinic. Outside blood pressures have been over 140/90. She does not follow a low salt diet.     She eats 2-3 servings of fruits and vegetables daily.She consumes 1 sweetened beverage(s) daily.She exercises with enough effort to increase her heart rate 10 to 19 minutes per day.  She exercises with enough effort to increase her heart rate 3 or less days per week.   She is taking medications regularly.     A couple days ago patient called in as she was noticing palpitations over the weekend and increasing blood pressure.  Home blood pressures are running in the 170s over 100s.  She was feeling short of breath with exertion and also weak.  She was then sent to an urgent care who then referred her on to the emergency department.  EKG revealed normal sinus rhythm with left axis deviation and possible  septal infarct age undetermined.   While waiting to be seen they realize that the patient had stopped taking her metoprolol completely for about 5 days thinking she only needed to take the losartan.  Patient had labs drawn at the emergency department and her initial high-sensitivity troponin was 32 and then a couple hours later was increased at 39.  The patient got tired of waiting to be seen and wanted to take her metoprolol that she realized she had not been taking and so they left without being seen.  When they got home she took her metoprolol and by the next day she was feeling back to normal.  She denies shortness of breath.  She states the weakness resolved.  She feels back to her normal self.  She denies having any chest pain.        Objective    /80   Pulse 74   Temp 97.9  F (36.6  C) (Temporal)   Resp 16   SpO2 94%   There is no height or weight on file to calculate BMI.  Physical Exam   Gen: no apparent distress  Chest: clear to auscultation without wheeze, rale or rhonchi  Cor: regular rate and rhythm without murmur  Psych: Alert and oriented times 3; coherent speech, normal   rate and volume, able to articulate logical thoughts, able   to abstract reason, no tangential thoughts, no hallucinations   or delusions  Her affect is neutral        Signed Electronically by: Jacqueline Brandt MD

## 2024-02-16 DIAGNOSIS — F33.1 MODERATE RECURRENT MAJOR DEPRESSION (H): ICD-10-CM

## 2024-02-16 RX ORDER — ESCITALOPRAM OXALATE 10 MG/1
10 TABLET ORAL DAILY
Qty: 90 TABLET | Refills: 1 | Status: SHIPPED | OUTPATIENT
Start: 2024-02-16 | End: 2024-08-09

## 2024-02-22 ENCOUNTER — TELEPHONE (OUTPATIENT)
Dept: FAMILY MEDICINE | Facility: OTHER | Age: 89
End: 2024-02-22
Payer: MEDICARE

## 2024-02-22 NOTE — TELEPHONE ENCOUNTER
Advised daughter that no lisinopril has been sent to pharmacy by . She is going to go in to [facility] and talk to someone. Her [facility] bob shows a pending lisinopril for patient.

## 2024-02-22 NOTE — TELEPHONE ENCOUNTER
General Call      Reason for Call:  Patient and daughter stopped in to clinic to find out why Losartan 50mg was switched to lisinopril. Advised patient that lisinopril was not on her medication list and that Losartan is what was sent to [facility]. Daughter would like to have Losartan prescription sent to [facility] again.        Date of last appointment with provider: 1/31/2024    Could we send this information to you in [facility] or would you prefer to receive a phone call?:   Patient would prefer a phone call   Okay to leave a detailed message?: Yes at Home number on file [phone] (home)

## 2024-03-21 DIAGNOSIS — I10 HYPERTENSION, GOAL BELOW 150/90: ICD-10-CM

## 2024-03-21 RX ORDER — LOSARTAN POTASSIUM 50 MG/1
50 TABLET ORAL DAILY
Qty: 90 TABLET | Refills: 0 | Status: SHIPPED | OUTPATIENT
Start: 2024-03-21 | End: 2024-03-26

## 2024-03-26 ENCOUNTER — OFFICE VISIT (OUTPATIENT)
Dept: FAMILY MEDICINE | Facility: OTHER | Age: 89
End: 2024-03-26
Payer: MEDICARE

## 2024-03-26 VITALS
OXYGEN SATURATION: 95 % | WEIGHT: 137 LBS | DIASTOLIC BLOOD PRESSURE: 80 MMHG | RESPIRATION RATE: 16 BRPM | HEART RATE: 71 BPM | BODY MASS INDEX: 25.67 KG/M2 | TEMPERATURE: 99.1 F | SYSTOLIC BLOOD PRESSURE: 187 MMHG

## 2024-03-26 DIAGNOSIS — R00.2 PALPITATIONS: ICD-10-CM

## 2024-03-26 DIAGNOSIS — I10 HYPERTENSION, GOAL BELOW 150/90: Primary | ICD-10-CM

## 2024-03-26 LAB
ANION GAP SERPL CALCULATED.3IONS-SCNC: 9 MMOL/L (ref 7–15)
BUN SERPL-MCNC: 16.4 MG/DL (ref 8–23)
CALCIUM SERPL-MCNC: 9.4 MG/DL (ref 8.8–10.2)
CHLORIDE SERPL-SCNC: 105 MMOL/L (ref 98–107)
CREAT SERPL-MCNC: 1.19 MG/DL (ref 0.51–0.95)
DEPRECATED HCO3 PLAS-SCNC: 27 MMOL/L (ref 22–29)
EGFRCR SERPLBLD CKD-EPI 2021: 44 ML/MIN/1.73M2
ERYTHROCYTE [DISTWIDTH] IN BLOOD BY AUTOMATED COUNT: 12.7 % (ref 10–15)
GLUCOSE SERPL-MCNC: 100 MG/DL (ref 70–99)
HCT VFR BLD AUTO: 36.4 % (ref 35–47)
HGB BLD-MCNC: 12.1 G/DL (ref 11.7–15.7)
MAGNESIUM SERPL-MCNC: 1.9 MG/DL (ref 1.7–2.3)
MCH RBC QN AUTO: 32.8 PG (ref 26.5–33)
MCHC RBC AUTO-ENTMCNC: 33.2 G/DL (ref 31.5–36.5)
MCV RBC AUTO: 99 FL (ref 78–100)
PLATELET # BLD AUTO: 240 10E3/UL (ref 150–450)
POTASSIUM SERPL-SCNC: 4.3 MMOL/L (ref 3.4–5.3)
RBC # BLD AUTO: 3.69 10E6/UL (ref 3.8–5.2)
SODIUM SERPL-SCNC: 141 MMOL/L (ref 135–145)
TSH SERPL DL<=0.005 MIU/L-ACNC: 1.22 UIU/ML (ref 0.3–4.2)
WBC # BLD AUTO: 6.2 10E3/UL (ref 4–11)

## 2024-03-26 PROCEDURE — 99214 OFFICE O/P EST MOD 30 MIN: CPT | Performed by: FAMILY MEDICINE

## 2024-03-26 PROCEDURE — 36415 COLL VENOUS BLD VENIPUNCTURE: CPT | Performed by: FAMILY MEDICINE

## 2024-03-26 PROCEDURE — 80048 BASIC METABOLIC PNL TOTAL CA: CPT | Performed by: FAMILY MEDICINE

## 2024-03-26 PROCEDURE — 83735 ASSAY OF MAGNESIUM: CPT | Performed by: FAMILY MEDICINE

## 2024-03-26 PROCEDURE — 85027 COMPLETE CBC AUTOMATED: CPT | Performed by: FAMILY MEDICINE

## 2024-03-26 PROCEDURE — 84443 ASSAY THYROID STIM HORMONE: CPT | Performed by: FAMILY MEDICINE

## 2024-03-26 RX ORDER — METOPROLOL SUCCINATE 100 MG/1
150 TABLET, EXTENDED RELEASE ORAL DAILY
Qty: 135 TABLET | Refills: 3 | Status: SHIPPED | OUTPATIENT
Start: 2024-03-26 | End: 2024-04-02

## 2024-03-26 RX ORDER — RESPIRATORY SYNCYTIAL VIRUS VACCINE 120MCG/0.5
0.5 KIT INTRAMUSCULAR ONCE
Qty: 1 EACH | Refills: 0 | Status: CANCELLED | OUTPATIENT
Start: 2024-03-26 | End: 2024-03-26

## 2024-03-26 RX ORDER — LOSARTAN POTASSIUM 50 MG/1
50 TABLET ORAL DAILY
Qty: 90 TABLET | Refills: 3 | Status: SHIPPED | OUTPATIENT
Start: 2024-03-26 | End: 2024-04-02

## 2024-03-26 ASSESSMENT — ANXIETY QUESTIONNAIRES
6. BECOMING EASILY ANNOYED OR IRRITABLE: NOT AT ALL
IF YOU CHECKED OFF ANY PROBLEMS ON THIS QUESTIONNAIRE, HOW DIFFICULT HAVE THESE PROBLEMS MADE IT FOR YOU TO DO YOUR WORK, TAKE CARE OF THINGS AT HOME, OR GET ALONG WITH OTHER PEOPLE: NOT DIFFICULT AT ALL
4. TROUBLE RELAXING: NOT AT ALL
GAD7 TOTAL SCORE: 0
8. IF YOU CHECKED OFF ANY PROBLEMS, HOW DIFFICULT HAVE THESE MADE IT FOR YOU TO DO YOUR WORK, TAKE CARE OF THINGS AT HOME, OR GET ALONG WITH OTHER PEOPLE?: NOT DIFFICULT AT ALL
GAD7 TOTAL SCORE: 0
5. BEING SO RESTLESS THAT IT IS HARD TO SIT STILL: NOT AT ALL
1. FEELING NERVOUS, ANXIOUS, OR ON EDGE: NOT AT ALL
GAD7 TOTAL SCORE: 0
7. FEELING AFRAID AS IF SOMETHING AWFUL MIGHT HAPPEN: NOT AT ALL
3. WORRYING TOO MUCH ABOUT DIFFERENT THINGS: NOT AT ALL
7. FEELING AFRAID AS IF SOMETHING AWFUL MIGHT HAPPEN: NOT AT ALL
2. NOT BEING ABLE TO STOP OR CONTROL WORRYING: NOT AT ALL

## 2024-03-26 ASSESSMENT — PATIENT HEALTH QUESTIONNAIRE - PHQ9
SUM OF ALL RESPONSES TO PHQ QUESTIONS 1-9: 0
10. IF YOU CHECKED OFF ANY PROBLEMS, HOW DIFFICULT HAVE THESE PROBLEMS MADE IT FOR YOU TO DO YOUR WORK, TAKE CARE OF THINGS AT HOME, OR GET ALONG WITH OTHER PEOPLE: NOT DIFFICULT AT ALL
SUM OF ALL RESPONSES TO PHQ QUESTIONS 1-9: 0

## 2024-03-26 ASSESSMENT — ENCOUNTER SYMPTOMS: NERVOUS/ANXIOUS: 1

## 2024-03-26 NOTE — PROGRESS NOTES
Assessment & Plan     Hypertension, goal below 150/90/Palpitations  Blood pressure is increased since her last visit.  She is also complaining of increased palpitations.  Will increase her metoprolol from 100 mg to 150 mg daily.  Will continue her losartan without change.  Will obtain labs today.  Will also apply Zio patch in order to review her rhythm.  Lab her follow-up in 1 month.    - losartan (COZAAR) 50 MG tablet; Take 1 tablet (50 mg) by mouth daily  - metoprolol succinate ER (TOPROL XL) 100 MG 24 hr tablet; Take 1.5 tablets (150 mg) by mouth daily  - Basic metabolic panel  (Ca, Cl, CO2, Creat, Gluc, K, Na, BUN)  - TSH with free T4 reflex  - Magnesium  - CBC with platelets  - ZIO PATCH 3-7 DAYS     Subjective   Erwin is a 89 year old, presenting for the following health issues:  Depression, Anxiety, and Hypertension        3/26/2024     1:18 PM   Additional Questions   Roomed by nicolás giraldo   Accompanied by daughter     History of Present Illness       Mental Health Follow-up:  Patient presents to follow-up on Depression & Anxiety.Patient's depression since last visit has been:  Good  The patient is not having other symptoms associated with depression.  Patient's anxiety since last visit has been:  Good  The patient is not having other symptoms associated with anxiety.  Any significant life events: No  Patient is not feeling anxious or having panic attacks.  Patient has no concerns about alcohol or drug use.    Hypertension: She presents for follow up of hypertension.  She does not check blood pressure  regularly outside of the clinic. Outside blood pressures have been over 140/90. She does not follow a low salt diet.     She eats 2-3 servings of fruits and vegetables daily.She consumes 1 sweetened beverage(s) daily.She exercises with enough effort to increase her heart rate 10 to 19 minutes per day.  She exercises with enough effort to increase her heart rate 3 or less days per week.   She is taking  medications regularly.     Patient states she is noticing more intermittent thumping of her heart.  She denies shortness of breath.  She denies chest pain.  She denies headache.  She does admit to being under more stress lately with family issues occurring.      Objective    BP (!) 187/80   Pulse 71   Temp 99.1  F (37.3  C) (Temporal)   Resp 16   Wt 62.1 kg (137 lb)   SpO2 95%   BMI 25.67 kg/m    Body mass index is 25.67 kg/m .  Physical Exam   Gen: no apparent distress  Chest: clear to auscultation without wheeze, rale or rhonchi  Cor: regular rate and rhythm without murmur  Ext: warm and dry without edema  Psych: Alert and oriented times 3; coherent speech, normal   rate and volume, able to articulate logical thoughts, able   to abstract reason, no tangential thoughts, no hallucinations   or delusions  Her affect is neutral        Signed Electronically by: Jacqueline Brandt MD

## 2024-04-02 ENCOUNTER — TELEPHONE (OUTPATIENT)
Dept: FAMILY MEDICINE | Facility: OTHER | Age: 89
End: 2024-04-02
Payer: MEDICARE

## 2024-04-02 DIAGNOSIS — I10 HYPERTENSION, GOAL BELOW 150/90: ICD-10-CM

## 2024-04-02 RX ORDER — LOSARTAN POTASSIUM 50 MG/1
75 TABLET ORAL DAILY
Qty: 135 TABLET | Refills: 1 | Status: SHIPPED | OUTPATIENT
Start: 2024-04-02 | End: 2024-06-27

## 2024-04-02 RX ORDER — METOPROLOL SUCCINATE 100 MG/1
100 TABLET, EXTENDED RELEASE ORAL DAILY
Status: SHIPPED
Start: 2024-04-02 | End: 2024-05-02

## 2024-04-02 NOTE — TELEPHONE ENCOUNTER
Called daughter Magaly back. Instructed okay to change medications per providers note below. No further questions at this time.     Amanda ALEJON, RN

## 2024-04-02 NOTE — TELEPHONE ENCOUNTER
/89 and feeling the change in bp medications last week is slowing her down     Pt daughter calling asking if they can do 100mg of metorolol and 75mg of losartan instead of the 150mg of metoprolol and 50mg of losartan     Pt and daughter Leaving for [place] on Thursday     Please advise     Team call daughter back at [phone]

## 2024-04-10 PROCEDURE — 93244 EXT ECG>48HR<7D REV&INTERPJ: CPT | Performed by: INTERNAL MEDICINE

## 2024-04-23 ENCOUNTER — TRANSFERRED RECORDS (OUTPATIENT)
Dept: HEALTH INFORMATION MANAGEMENT | Facility: CLINIC | Age: 89
End: 2024-04-23
Payer: MEDICARE

## 2024-05-01 ASSESSMENT — PATIENT HEALTH QUESTIONNAIRE - PHQ9
SUM OF ALL RESPONSES TO PHQ QUESTIONS 1-9: 0
SUM OF ALL RESPONSES TO PHQ QUESTIONS 1-9: 0
10. IF YOU CHECKED OFF ANY PROBLEMS, HOW DIFFICULT HAVE THESE PROBLEMS MADE IT FOR YOU TO DO YOUR WORK, TAKE CARE OF THINGS AT HOME, OR GET ALONG WITH OTHER PEOPLE: NOT DIFFICULT AT ALL

## 2024-05-02 ENCOUNTER — OFFICE VISIT (OUTPATIENT)
Dept: FAMILY MEDICINE | Facility: OTHER | Age: 89
End: 2024-05-02
Payer: MEDICARE

## 2024-05-02 VITALS
TEMPERATURE: 97.7 F | DIASTOLIC BLOOD PRESSURE: 62 MMHG | HEART RATE: 65 BPM | SYSTOLIC BLOOD PRESSURE: 142 MMHG | OXYGEN SATURATION: 95 % | RESPIRATION RATE: 16 BRPM | BODY MASS INDEX: 25.67 KG/M2 | WEIGHT: 137 LBS

## 2024-05-02 DIAGNOSIS — I10 HYPERTENSION, GOAL BELOW 150/90: Primary | ICD-10-CM

## 2024-05-02 LAB
ANION GAP SERPL CALCULATED.3IONS-SCNC: 12 MMOL/L (ref 7–15)
BUN SERPL-MCNC: 12.2 MG/DL (ref 8–23)
CALCIUM SERPL-MCNC: 9.5 MG/DL (ref 8.8–10.2)
CHLORIDE SERPL-SCNC: 102 MMOL/L (ref 98–107)
CREAT SERPL-MCNC: 0.97 MG/DL (ref 0.51–0.95)
CREAT UR-MCNC: 74.9 MG/DL
DEPRECATED HCO3 PLAS-SCNC: 26 MMOL/L (ref 22–29)
EGFRCR SERPLBLD CKD-EPI 2021: 56 ML/MIN/1.73M2
GLUCOSE SERPL-MCNC: 108 MG/DL (ref 70–99)
MICROALBUMIN UR-MCNC: 32.3 MG/L
MICROALBUMIN/CREAT UR: 43.12 MG/G CR (ref 0–25)
POTASSIUM SERPL-SCNC: 4.1 MMOL/L (ref 3.4–5.3)
SODIUM SERPL-SCNC: 140 MMOL/L (ref 135–145)

## 2024-05-02 PROCEDURE — 80048 BASIC METABOLIC PNL TOTAL CA: CPT | Performed by: FAMILY MEDICINE

## 2024-05-02 PROCEDURE — 99213 OFFICE O/P EST LOW 20 MIN: CPT | Performed by: FAMILY MEDICINE

## 2024-05-02 PROCEDURE — 82043 UR ALBUMIN QUANTITATIVE: CPT | Performed by: FAMILY MEDICINE

## 2024-05-02 PROCEDURE — 82570 ASSAY OF URINE CREATININE: CPT | Performed by: FAMILY MEDICINE

## 2024-05-02 PROCEDURE — 36415 COLL VENOUS BLD VENIPUNCTURE: CPT | Performed by: FAMILY MEDICINE

## 2024-05-02 RX ORDER — METOPROLOL SUCCINATE 100 MG/1
100 TABLET, EXTENDED RELEASE ORAL DAILY
Qty: 90 TABLET | Refills: 3 | Status: SHIPPED | OUTPATIENT
Start: 2024-05-02

## 2024-05-02 RX ORDER — RESPIRATORY SYNCYTIAL VIRUS VACCINE 120MCG/0.5
0.5 KIT INTRAMUSCULAR ONCE
Qty: 1 EACH | Refills: 0 | Status: CANCELLED | OUTPATIENT
Start: 2024-05-02 | End: 2024-05-02

## 2024-05-02 ASSESSMENT — PAIN SCALES - GENERAL: PAINLEVEL: NO PAIN (0)

## 2024-05-02 NOTE — PROGRESS NOTES
Assessment & Plan     Hypertension, goal below 150/90  Blood pressure is improved.  Will recheck her basic metabolic panel today.  She will follow-up for annual wellness which is due at the end of the year.     - metoprolol succinate ER (TOPROL XL) 100 MG 24 hr tablet; Take 1 tablet (100 mg) by mouth daily  - Basic metabolic panel  (Ca, Cl, CO2, Creat, Gluc, K, Na, BUN)      Emily Hood is a 89 year old, presenting for the following health issues:  Hypertension        5/2/2024     9:09 AM   Additional Questions   Roomed by nicolás giraldo     History of Present Illness       Hypertension: She presents for follow up of hypertension.  She does check blood pressure  regularly outside of the clinic. Outpatient blood pressures have not been over 140/90. She does not follow a low salt diet.     She eats 2-3 servings of fruits and vegetables daily.She consumes 1 sweetened beverage(s) daily.She exercises with enough effort to increase her heart rate 9 or less minutes per day.  She exercises with enough effort to increase her heart rate 3 or less days per week.   She is taking medications regularly.     She feels her mood has improved.    She does not notice palpitations aside from when she was driving to see the dentist.  She states her blood pressure was a little elevated with the dentist but when she saw her eye doctor it was great.  She denies any chest pain or shortness of breath.  She denies any syncopal episodes.  Her Zio patch was reviewed and she had a few short-lived runs of SVT.      Objective    BP (!) 142/62   Pulse 65   Temp 97.7  F (36.5  C) (Temporal)   Resp 16   Wt 62.1 kg (137 lb)   SpO2 95%   BMI 25.67 kg/m    Body mass index is 25.67 kg/m .  Physical Exam   Gen: no apparent distress  Chest: clear to auscultation without wheeze, rale or rhonchi  Cor: regular rate and rhythm without murmur  Ext: warm and dry without edema  Psych: Alert and oriented times 3; coherent speech, normal   rate and  volume, able to articulate logical thoughts, able   to abstract reason, no tangential thoughts, no hallucinations   or delusions  Her affect is bright        Signed Electronically by: Jacqueline Brandt MD

## 2024-06-27 DIAGNOSIS — I10 HYPERTENSION, GOAL BELOW 150/90: ICD-10-CM

## 2024-06-27 RX ORDER — LOSARTAN POTASSIUM 50 MG/1
75 TABLET ORAL DAILY
Qty: 135 TABLET | Refills: 0 | Status: SHIPPED | OUTPATIENT
Start: 2024-06-27

## 2024-08-09 DIAGNOSIS — F33.1 MODERATE RECURRENT MAJOR DEPRESSION (H): ICD-10-CM

## 2024-08-09 RX ORDER — ESCITALOPRAM OXALATE 10 MG/1
10 TABLET ORAL DAILY
Qty: 90 TABLET | Refills: 2 | Status: SHIPPED | OUTPATIENT
Start: 2024-08-09

## 2024-08-09 NOTE — TELEPHONE ENCOUNTER
Medication passed protocol, however, refill RN could not approve because of the medication warning/interaction. Provider, please approve or deny the prescription.    MELO KeyN, RN

## 2024-10-22 ENCOUNTER — TRANSFERRED RECORDS (OUTPATIENT)
Dept: HEALTH INFORMATION MANAGEMENT | Facility: CLINIC | Age: 89
End: 2024-10-22
Payer: MEDICARE

## 2024-11-14 NOTE — TELEPHONE ENCOUNTER
Surgery Scheduled    Date of Surgery 6/20 Time of Surgery   Procedure: Setoplasty, submucosal resection of the turbinates  Hospital/Surgical Facility: [place]  Surgeon: Kenny  Type of Anesthesia : General  Pre-op 6/13 with Dr. Diego  2 week post op:6/26 with Kenny        Surgery packet given to patient in clinic. Patients instructed to arrive 1 hours prior to surgery. Patient understood and agrees to plan.    Tabitha Valencia  Surgical Scheduler    
did not test

## 2024-11-19 ENCOUNTER — PATIENT OUTREACH (OUTPATIENT)
Dept: CARE COORDINATION | Facility: CLINIC | Age: 89
End: 2024-11-19
Payer: MEDICARE

## 2024-11-21 DIAGNOSIS — I10 HYPERTENSION, GOAL BELOW 150/90: ICD-10-CM

## 2024-11-21 RX ORDER — LOSARTAN POTASSIUM 50 MG/1
TABLET ORAL
Qty: 135 TABLET | Refills: 1 | Status: SHIPPED | OUTPATIENT
Start: 2024-11-21

## 2024-12-03 ENCOUNTER — PATIENT OUTREACH (OUTPATIENT)
Dept: CARE COORDINATION | Facility: CLINIC | Age: 89
End: 2024-12-03
Payer: MEDICARE

## 2024-12-20 ENCOUNTER — MYC REFILL (OUTPATIENT)
Dept: FAMILY MEDICINE | Facility: OTHER | Age: 89
End: 2024-12-20
Payer: MEDICARE

## 2024-12-20 DIAGNOSIS — G43.109 MIGRAINE WITH AURA AND WITHOUT STATUS MIGRAINOSUS, NOT INTRACTABLE: ICD-10-CM

## 2024-12-20 NOTE — MR AVS SNAPSHOT
After Visit Summary   12/4/2018    Erwin Tolliver    MRN: [id number]           Patient Information     Date Of Birth          1935        Visit Information        Provider Department      12/4/2018 2:00 PM Jacqueline Brandt MD [facility]        Today's Diagnoses     Encounter for routine adult health examination without abnormal findings    -  1    Need for prophylactic vaccination and inoculation against influenza        Hypertension, goal below 150/90        Moderate recurrent major depression (H)        Hypothyroidism, unspecified type        Hyperlipidemia, unspecified hyperlipidemia type        Insomnia, unspecified type        Gastroesophageal reflux disease without esophagitis        Migraine with aura and without status migrainosus, not intractable          Care Instructions      Preventive Health Recommendations    See your health care provider every year to    Review health changes.     Discuss preventive care.      Review your medicines if your doctor has prescribed any.      You no longer need a yearly Pap test unless you've had an abnormal Pap test in the past 10 years. If you have vaginal symptoms, such as bleeding or discharge, be sure to talk with your provider about a Pap test.      Every 1 to 2 years, have a mammogram.  If you are over 69, talk with your health care provider about whether or not you want to continue having screening mammograms.      Every 10 years, have a colonoscopy. Or, have a yearly FIT test (stool test). These exams will check for colon cancer.       Have a cholesterol test every 5 years, or more often if your doctor advises it.       Have a diabetes test (fasting glucose) every three years. If you are at risk for diabetes, you should have this test more often.       At age 65, have a bone density scan (DEXA) to check for osteoporosis (brittle bone disease).    Shots:    Get a flu shot each year.    Get a tetanus shot every 10  years.    Talk to your doctor about your pneumonia vaccines. There are now two you should receive - Pneumovax (PPSV 23) and Prevnar (PCV 13).    Talk to your pharmacist about the shingles vaccine.    Talk to your doctor about the hepatitis B vaccine.    Nutrition:     Eat at least 5 servings of fruits and vegetables each day.      Eat whole-grain bread, whole-wheat pasta and brown rice instead of white grains and rice.      Get adequate Calcium and Vitamin D.     Lifestyle    Exercise at least 150 minutes a week (30 minutes a day, 5 days a week). This will help you control your weight and prevent disease.      Limit alcohol to one drink per day.      No smoking.       Wear sunscreen to prevent skin cancer.       See your dentist twice a year for an exam and cleaning.      See your eye doctor every 1 to 2 years to screen for conditions such as glaucoma, macular degeneration and cataracts.    Personalized Prevention Plan  You are due for the preventive services outlined below.  Your care team is available to assist you in scheduling these services.  If you have already completed any of these items, please share that information with your care team to update in your medical record.  Health Maintenance Due   Topic Date Due     FALL RISK ASSESSMENT  05/16/2018     Flu Vaccine (1) 09/01/2018     Discuss Advance Directive Planning  10/01/2018     Microalbumin Lab - yearly  11/24/2018     Thyroid Function Lab (TSH) - yearly  11/27/2018     Basic Metabolic Lab - yearly  11/27/2018     Hemoglobin Lab - yearly  11/27/2018     Cholesterol Lab - yearly  11/27/2018             Follow-ups after your visit        Follow-up notes from your care team     Return in about 1 year (around 12/4/2019) for Physical Exam.      Who to contact     If you have questions or need follow up information about today's clinic visit or your schedule please contact [facility] directly at [phone].  Normal or non-critical lab and  "imaging results will be communicated to you by MyChart, letter or phone within 4 business days after the clinic has received the results. If you do not hear from us within 7 days, please contact the clinic through [employer] or phone. If you have a critical or abnormal lab result, we will notify you by phone as soon as possible.  Submit refill requests through [employer] or call your pharmacy and they will forward the refill request to us. Please allow 3 business days for your refill to be completed.          Additional Information About Your Visit        [employer] Information     [employer] gives you secure access to your electronic health record. If you see a primary care provider, you can also send messages to your care team and make appointments. If you have questions, please call your primary care clinic.  If you do not have a primary care provider, please call [phone] and they will assist you.        Care EveryWhere ID     This is your Care EveryWhere ID. This could be used by other organizations to access your [place] medical records  [id number]        Your Vitals Were     Pulse Temperature Respirations Height Pulse Oximetry BMI (Body Mass Index)    80 98.9  F (37.2  C) (Temporal) 14 5' 1.5\" (1.562 m) 97% 27.7 kg/m2       Blood Pressure from Last 3 Encounters:   12/04/18 104/64   03/27/18 170/78   11/24/17 132/76    Weight from Last 3 Encounters:   12/04/18 149 lb (67.6 kg)   11/24/17 151 lb (68.5 kg)   07/24/17 149 lb 12.8 oz (67.9 kg)              We Performed the Following     ADMIN INFLUENZA (For MEDICARE Patients ONLY) []     Albumin Random Urine Quantitative with Creat Ratio     Comprehensive metabolic panel     FLU VACCINE, INCREASED ANTIGEN, PRESV FREE, AGE 65+ [10452]     Hemoglobin     Lipid panel reflex to direct LDL Non-fasting     TSH WITH FREE T4 REFLEX          Today's Medication Changes          These changes are accurate as of 12/4/18  2:29 PM.  If you have any questions, ask your nurse or " doctor.               These medicines have changed or have updated prescriptions.        Dose/Directions    levothyroxine 88 MCG tablet   Commonly known as:  SYNTHROID/LEVOTHROID   This may have changed:  See the new instructions.   Used for:  Hypothyroidism, unspecified type   Changed by:  Jacqueline Brandt MD        Dose:  88 mcg   Take 1 tablet (88 mcg) by mouth daily   Quantity:  90 tablet   Refills:  3       traZODone 100 MG tablet   Commonly known as:  DESYREL   This may have changed:    - medication strength  - how much to take  - how to take this  - when to take this  - additional instructions   Used for:  Insomnia, unspecified type   Changed by:  Jacqueline Brandt MD        Dose:  100 mg   Take 1 tablet (100 mg) by mouth At Bedtime   Quantity:  90 tablet   Refills:  3            Where to get your medicines      These medications were sent to [address] [address]     Phone:  [phone]     buPROPion 300 MG 24 hr tablet    levothyroxine 88 MCG tablet    metoprolol succinate  MG 24 hr tablet    simvastatin 20 MG tablet    traZODone 100 MG tablet    venlafaxine 150 MG 24 hr capsule                Primary Care Provider Office Phone # Fax #    Jacqueline Brandt [phone]       [address] [address]        Equal Access to Services     [facility]: Hadii ira jones hadasho Sochenchoali, waaxda luqadaha, qaybta kaalmada adeegyada, luis daniel walsh hayjulian haro . So [facility] [phone].    ATENCIÓN: Si habla español, tiene a caraballo disposición servicios gratuitos de asistencia lingüística. Llame al [phone].    We comply with applicable federal civil rights laws and [place] laws. We do not discriminate on the basis of race, color, national origin, age, disability, sex, sexual orientation, or gender identity.            Thank you!     Thank you for choosing [facility]  [place]  for your care. Our goal is always to provide you with excellent care. Hearing back from our patients is one way we can continue to improve our services. Please take a few minutes to complete the written survey that you may receive in the mail after your visit with us. Thank you!             Your Updated Medication List - Protect others around you: Learn how to safely use, store and throw away your medicines at www.disposemymeds.org.          This list is accurate as of 12/4/18  2:29 PM.  Always use your most recent med list.                   Brand Name Dispense Instructions for use Diagnosis    ACETAMINOPHEN PO           ADVIL COLD/SINUS PO           buPROPion 300 MG 24 hr tablet    WELLBUTRIN XL    90 tablet    Take 1 tablet (300 mg) by mouth every morning    Moderate recurrent major depression (H)       ipratropium 0.03 % nasal spray    ATROVENT    1 Box    Spray 2 sprays into both nostrils 3 times daily    Seasonal allergic rhinitis due to other allergic trigger       levothyroxine 88 MCG tablet    SYNTHROID/LEVOTHROID    90 tablet    Take 1 tablet (88 mcg) by mouth daily    Hypothyroidism, unspecified type       metoprolol succinate  MG 24 hr tablet    TOPROL-XL    90 tablet    Take 1 tablet (100 mg) by mouth daily    Hypertension, goal below 150/90       nitroGLYcerin 0.4 MG sublingual tablet    NITROSTAT    1 tablet    Place 1 tablet (0.4 mg) under the tongue once for 1 dose For chest pain place 1 tablet under the tongue every 5 minutes for 3 doses. If symptoms persist 5 minutes after 1st dose call 911.    Acute chest pain, SOB (shortness of breath)       omeprazole 20 MG DR capsule    priLOSEC    45 capsule    Take 1 capsule (20 mg) by mouth every other day TAKE ONE CAPSULE BY MOUTH EVERY DAY AS NEEDED    Gastroesophageal reflux disease without esophagitis       ondansetron 4 MG ODT tab    ZOFRAN-ODT    10 tablet    Take 1-2 tablets (4-8 mg) by mouth every 8 hours as needed for nausea Dissolve  ON the tongue.        simvastatin 20 MG tablet    ZOCOR    90 tablet    TAKE ONE TABLET BY MOUTH AT BEDTIME    Hyperlipidemia, unspecified hyperlipidemia type       SUMAtriptan 25 MG tablet    IMITREX    9 tablet    Take 1-2 tablets (25-50 mg) by mouth at onset of headache for migraine May repeat in 2 hours. Max 8 tablets/24 hours.    Migraine with aura and without status migrainosus, not intractable       traZODone 100 MG tablet    DESYREL    90 tablet    Take 1 tablet (100 mg) by mouth At Bedtime    Insomnia, unspecified type       venlafaxine 150 MG 24 hr capsule    EFFEXOR-XR    90 capsule    TAKE 1 CAPSULE BY MOUTH DAILY    Moderate recurrent major depression (H)          The patient is a 83y Male complaining of urinary retention.

## 2024-12-23 RX ORDER — SUMATRIPTAN SUCCINATE 25 MG/1
25-50 TABLET ORAL
Qty: 18 TABLET | Refills: 0 | Status: SHIPPED | OUTPATIENT
Start: 2024-12-23

## 2025-01-14 ENCOUNTER — OFFICE VISIT (OUTPATIENT)
Dept: FAMILY MEDICINE | Facility: OTHER | Age: 89
End: 2025-01-14
Attending: FAMILY MEDICINE
Payer: MEDICARE

## 2025-01-14 VITALS
TEMPERATURE: 97.7 F | OXYGEN SATURATION: 96 % | BODY MASS INDEX: 24.92 KG/M2 | RESPIRATION RATE: 15 BRPM | HEART RATE: 64 BPM | DIASTOLIC BLOOD PRESSURE: 84 MMHG | HEIGHT: 61 IN | WEIGHT: 132 LBS | SYSTOLIC BLOOD PRESSURE: 130 MMHG

## 2025-01-14 DIAGNOSIS — E03.9 HYPOTHYROIDISM, UNSPECIFIED TYPE: ICD-10-CM

## 2025-01-14 DIAGNOSIS — E78.5 HYPERLIPIDEMIA, UNSPECIFIED HYPERLIPIDEMIA TYPE: ICD-10-CM

## 2025-01-14 DIAGNOSIS — I10 HYPERTENSION, GOAL BELOW 150/90: ICD-10-CM

## 2025-01-14 DIAGNOSIS — F33.1 MODERATE RECURRENT MAJOR DEPRESSION (H): ICD-10-CM

## 2025-01-14 DIAGNOSIS — Z00.00 ENCOUNTER FOR MEDICARE ANNUAL WELLNESS EXAM: Primary | ICD-10-CM

## 2025-01-14 DIAGNOSIS — N18.2 CHRONIC KIDNEY DISEASE, STAGE 2 (MILD): ICD-10-CM

## 2025-01-14 DIAGNOSIS — G47.00 INSOMNIA, UNSPECIFIED TYPE: ICD-10-CM

## 2025-01-14 DIAGNOSIS — G43.109 MIGRAINE WITH AURA AND WITHOUT STATUS MIGRAINOSUS, NOT INTRACTABLE: ICD-10-CM

## 2025-01-14 LAB
ALBUMIN SERPL BCG-MCNC: 4.5 G/DL (ref 3.5–5.2)
ALP SERPL-CCNC: 83 U/L (ref 40–150)
ALT SERPL W P-5'-P-CCNC: 22 U/L (ref 0–50)
ANION GAP SERPL CALCULATED.3IONS-SCNC: 14 MMOL/L (ref 7–15)
AST SERPL W P-5'-P-CCNC: 31 U/L (ref 0–45)
BILIRUB SERPL-MCNC: 0.7 MG/DL
BUN SERPL-MCNC: 14.9 MG/DL (ref 8–23)
CALCIUM SERPL-MCNC: 9.4 MG/DL (ref 8.8–10.4)
CHLORIDE SERPL-SCNC: 103 MMOL/L (ref 98–107)
CHOLEST SERPL-MCNC: 170 MG/DL
CREAT SERPL-MCNC: 0.8 MG/DL (ref 0.51–0.95)
EGFRCR SERPLBLD CKD-EPI 2021: 70 ML/MIN/1.73M2
FASTING STATUS PATIENT QL REPORTED: NO
FASTING STATUS PATIENT QL REPORTED: NO
GLUCOSE SERPL-MCNC: 103 MG/DL (ref 70–99)
HCO3 SERPL-SCNC: 22 MMOL/L (ref 22–29)
HDLC SERPL-MCNC: 64 MG/DL
LDLC SERPL CALC-MCNC: 76 MG/DL
NONHDLC SERPL-MCNC: 106 MG/DL
POTASSIUM SERPL-SCNC: 4.3 MMOL/L (ref 3.4–5.3)
PROT SERPL-MCNC: 7.3 G/DL (ref 6.4–8.3)
SODIUM SERPL-SCNC: 139 MMOL/L (ref 135–145)
TRIGL SERPL-MCNC: 150 MG/DL
TSH SERPL DL<=0.005 MIU/L-ACNC: 1.06 UIU/ML (ref 0.3–4.2)

## 2025-01-14 PROCEDURE — 36415 COLL VENOUS BLD VENIPUNCTURE: CPT | Performed by: FAMILY MEDICINE

## 2025-01-14 PROCEDURE — 99214 OFFICE O/P EST MOD 30 MIN: CPT | Mod: 25 | Performed by: FAMILY MEDICINE

## 2025-01-14 PROCEDURE — 91320 SARSCV2 VAC 30MCG TRS-SUC IM: CPT | Performed by: FAMILY MEDICINE

## 2025-01-14 PROCEDURE — 80053 COMPREHEN METABOLIC PANEL: CPT | Performed by: FAMILY MEDICINE

## 2025-01-14 PROCEDURE — G0439 PPPS, SUBSEQ VISIT: HCPCS | Performed by: FAMILY MEDICINE

## 2025-01-14 PROCEDURE — 80061 LIPID PANEL: CPT | Performed by: FAMILY MEDICINE

## 2025-01-14 PROCEDURE — 90480 ADMN SARSCOV2 VAC 1/ONLY CMP: CPT | Performed by: FAMILY MEDICINE

## 2025-01-14 PROCEDURE — 84443 ASSAY THYROID STIM HORMONE: CPT | Performed by: FAMILY MEDICINE

## 2025-01-14 RX ORDER — METOPROLOL SUCCINATE 100 MG/1
100 TABLET, EXTENDED RELEASE ORAL DAILY
Qty: 90 TABLET | Refills: 3 | Status: SHIPPED | OUTPATIENT
Start: 2025-01-14

## 2025-01-14 RX ORDER — TRAZODONE HYDROCHLORIDE 100 MG/1
TABLET ORAL
Qty: 90 TABLET | Refills: 3 | Status: SHIPPED | OUTPATIENT
Start: 2025-01-14

## 2025-01-14 RX ORDER — LEVOTHYROXINE SODIUM 88 UG/1
88 TABLET ORAL DAILY
Qty: 90 TABLET | Refills: 3 | Status: SHIPPED | OUTPATIENT
Start: 2025-01-14

## 2025-01-14 RX ORDER — LOSARTAN POTASSIUM 50 MG/1
75 TABLET ORAL DAILY
Qty: 135 TABLET | Refills: 3 | Status: SHIPPED | OUTPATIENT
Start: 2025-01-14

## 2025-01-14 RX ORDER — SIMVASTATIN 20 MG
20 TABLET ORAL AT BEDTIME
Qty: 90 TABLET | Refills: 3 | Status: SHIPPED | OUTPATIENT
Start: 2025-01-14

## 2025-01-14 RX ORDER — ESCITALOPRAM OXALATE 10 MG/1
10 TABLET ORAL DAILY
Qty: 90 TABLET | Refills: 3 | Status: SHIPPED | OUTPATIENT
Start: 2025-01-14

## 2025-01-14 RX ORDER — SUMATRIPTAN SUCCINATE 25 MG/1
25-50 TABLET ORAL
Qty: 18 TABLET | Refills: 11 | Status: SHIPPED | OUTPATIENT
Start: 2025-01-14

## 2025-01-14 SDOH — HEALTH STABILITY: PHYSICAL HEALTH: ON AVERAGE, HOW MANY DAYS PER WEEK DO YOU ENGAGE IN MODERATE TO STRENUOUS EXERCISE (LIKE A BRISK WALK)?: 3 DAYS

## 2025-01-14 SDOH — HEALTH STABILITY: PHYSICAL HEALTH: ON AVERAGE, HOW MANY MINUTES DO YOU ENGAGE IN EXERCISE AT THIS LEVEL?: 60 MIN

## 2025-01-14 ASSESSMENT — PATIENT HEALTH QUESTIONNAIRE - PHQ9
SUM OF ALL RESPONSES TO PHQ QUESTIONS 1-9: 4
10. IF YOU CHECKED OFF ANY PROBLEMS, HOW DIFFICULT HAVE THESE PROBLEMS MADE IT FOR YOU TO DO YOUR WORK, TAKE CARE OF THINGS AT HOME, OR GET ALONG WITH OTHER PEOPLE: SOMEWHAT DIFFICULT
SUM OF ALL RESPONSES TO PHQ QUESTIONS 1-9: 4

## 2025-01-14 ASSESSMENT — SOCIAL DETERMINANTS OF HEALTH (SDOH): HOW OFTEN DO YOU GET TOGETHER WITH FRIENDS OR RELATIVES?: TWICE A WEEK

## 2025-01-14 NOTE — PROGRESS NOTES
Preventive Care Visit  [facility]  Jacqueline Brandt MD, Family Medicine  Jan 14, 2025      Assessment & Plan     Encounter for Medicare annual wellness exam    Moderate recurrent major depression (H)  She feels a little more down and lonely.  She states she has not been as active in her apartment building as there was a person who she felt was being mean to her but that person is now retired.  Her daughter is here today for does not recall these episodes that the patient is explaining.  At this time we will continue her Lexapro without change.  Encouraged her to be more socially interactive.    - escitalopram (LEXAPRO) 10 MG tablet; Take 1 tablet (10 mg) by mouth daily.    Insomnia, unspecified type  She finds trazodone effective.  Refills are given.    - traZODone (DESYREL) 100 MG tablet; TAKE ONE TABLET BY MOUTH AT BEDTIME    Hypertension, goal below 150/90  Well-controlled.  Continue metoprolol and losartan without change.    - metoprolol succinate ER (TOPROL XL) 100 MG 24 hr tablet; Take 1 tablet (100 mg) by mouth daily.  - losartan (COZAAR) 50 MG tablet; Take 1.5 tablets (75 mg) by mouth daily.    Chronic kidney disease, stage 2 (mild)  Discussed avoiding ibuprofen.  Keeping her blood pressure controlled.  Will recheck labs.    - COMPREHENSIVE METABOLIC PANEL; Future    Hyperlipidemia, unspecified hyperlipidemia type  Continues on statin.  Refills given.  Labs ordered.    - Lipid panel reflex to direct LDL Non-fasting; Future  - simvastatin (ZOCOR) 20 MG tablet; Take 1 tablet (20 mg) by mouth at bedtime.    Hypothyroidism, unspecified type  Refills given.  Labs ordered.    - levothyroxine (SYNTHROID/LEVOTHROID) 88 MCG tablet; Take 1 tablet (88 mcg) by mouth daily.  - TSH with free T4 reflex; Future    Migraine with aura and without status migrainosus, not intractable  She is only filled her Imitrex once over the last year.  She feels that her headaches are happening a little more  frequently.  She states previously she knew she was getting a migraine as her vision will get blurry but she states as her vision has been worsening she is unable to tell if she is having a migraine.  We discussed that she could consider trying Tylenol first and if her headache does not resolve in an hour she could then try the Imitrex.    - SUMAtriptan (IMITREX) 25 MG tablet; Take 1-2 tablets (25-50 mg) by mouth at onset of headache for migraine. May repeat in 2 hours. Max 8 tablets/24 hours.    Counseling  Appropriate preventive services were addressed with this patient via screening, questionnaire, or discussion as appropriate for fall prevention, nutrition, physical activity, Tobacco-use cessation, social engagement, weight loss and cognition.  Checklist reviewing preventive services available has been given to the patient.  Reviewed patient's diet, addressing concerns and/or questions.   She is at risk for lack of exercise and has been provided with information to increase physical activity for the benefit of her well-being.   The patient was instructed to see the dentist every 6 months.   She is at risk for psychosocial distress and has been provided with information to reduce risk.   The patient reports drinking more than 3 alcoholic drinks per day and/or more than 7 drhnks per week. The patient was counseled and given information about possible harmful effects of excessive alcohol intake.The patient was provided with written information regarding signs of hearing loss.   Information on urinary incontinence and treatment options given to patient.           Emily Hood is a 89 year old, presenting for the following:  Medicare Visit        1/14/2025     1:26 PM   Additional Questions   Roomed by nikolai   Accompanied by daughter           HPI    Health Care Directive  Patient has a Health Care Directive on file  Advance care planning document is on file and is current.      1/14/2025   [facility]    How would you rate your overall physical health? Good   Feel stress (tense, anxious, or unable to sleep) To some extent   (!) STRESS CONCERN      1/14/2025   Nutrition   Diet: Regular (no restrictions)         1/14/2025   Exercise   Days per week of moderate/strenous exercise 3 days   Average minutes spent exercising at this level 60 min         1/14/2025   Social Factors   Frequency of gathering with friends or relatives Twice a week   Worry food won't last until get money to buy more No   Food not last or not have enough money for food? No   Do you have housing? (Housing is defined as stable permanent housing and does not include staying ouside in a car, in a tent, in an abandoned building, in an overnight shelter, or couch-surfing.) Yes   Are you worried about losing your housing? No   Lack of transportation? No   Unable to get utilities (heat,electricity)? No         1/14/2025   Fall Risk   Fallen 2 or more times in the past year? Yes   Trouble with walking or balance? No   Gait Speed Test (Document in seconds) 5   Gait Speed Test Interpretation Less than or equal to 5.00 seconds - PASS          1/14/2025   Activities of Daily Living- Home Safety   Needs help with the following daily activites None of the above   Safety concerns in the home None of the above         1/14/2025   Dental   Dentist two times every year? (!) NO         1/14/2025   Hearing Screening   Hearing concerns? (!) I NEED TO ASK PEOPLE TO SPEAK UP OR REPEAT THEMSELVES.         1/14/2025   Driving Risk Screening   Patient/family members have concerns about driving No         1/14/2025   General Alertness/Fatigue Screening   Have you been more tired than usual lately? No         1/14/2025   Urinary Incontinence Screening   Bothered by leaking urine in past 6 months Yes         1/14/2025   TB Screening   Were you born outside of the [place]? No       Today's PHQ-9 Score:       1/14/2025     1:12 PM   PHQ-9 SCORE   PHQ-9 Total Score Danny 4  (Minimal depression)   PHQ-9 Total Score 4        Patient-reported         2025   Substance Use   Alcohol more than 3/day or more than 7/wk Yes   How often do you have a drink containing alcohol 2 to 3 times a week   How many alcohol drinks on typical day 1 or 2   How often do you have 5+ drinks at one occasion Never   Audit 2/3 Score 0   How often not able to stop drinking once started Never   How often failed to do what normally expected Never   How often needed first drink in am after a heavy drinking session Never   How often feeling of guilt or remorse after drinking Never   How often unable to remember what happened the night before Never   Have you or someone else been injured because of your drinking No   Has anyone been concerned or suggested you cut down on drinking No   TOTAL SCORE - AUDIT 3   Do you have a current opioid prescription? No   How severe/bad is pain from 1 to 10? 7/10   Do you use any other substances recreationally? No     Social History     Tobacco Use    Smoking status: Former     Current packs/day: 0.00     Average packs/day: 0.5 packs/day for 10.0 years (5.0 ttl pk-yrs)     Types: Cigarettes     Start date: 1956     Quit date: 1966     Years since quittin.0    Smokeless tobacco: Never    Tobacco comments:     no smokers in household   Vaping Use    Vaping status: Never Used   Substance Use Topics    Alcohol use: No     Alcohol/week: 1.7 standard drinks of alcohol     Comment: beerx2/ x8per month    Drug use: No          Mammogram Screening - After age 74- determine frequency with patient based on health status, life expectancy and patient goals          Reviewed and updated as needed this visit by Provider   Tobacco  Allergies  Meds  Problems  Med Hx  Surg Hx  Fam Hx            Current providers sharing in care for this patient include:  Patient Care Team:  Jacqueline Brandt MD as PCP - General  Jacqueline Brandt MD as Assigned PCP    The following  "health maintenance items are reviewed in Epic and correct as of today:  Health Maintenance   Topic Date Due    RSV VACCINE (1 - 1-dose 75+ series) Never done    ZOSTER IMMUNIZATION (2 of 3) 12/27/2013    DTAP/TDAP/TD IMMUNIZATION (2 - Td or Tdap) 11/01/2023    COVID-19 Vaccine (5 - 2024-25 season) 09/01/2024    CMP  12/19/2024    LIPID  12/19/2024    ANNUAL REVIEW OF HM ORDERS  12/19/2024    TSH W/FREE T4 REFLEX  03/26/2025    BMP  05/02/2025    MICROALBUMIN  05/02/2025    PHQ-9  07/14/2025    DEXA  11/12/2025    MEDICARE ANNUAL WELLNESS VISIT  01/14/2026    FALL RISK ASSESSMENT  01/14/2026    ADVANCE CARE PLANNING  12/19/2028    DEPRESSION ACTION PLAN  Completed    INFLUENZA VACCINE  Completed    Pneumococcal Vaccine: 50+ Years  Completed    URINALYSIS  Completed    HPV IMMUNIZATION  Aged Out    MENINGITIS IMMUNIZATION  Aged Out    RSV MONOCLONAL ANTIBODY  Aged Out          Objective    Exam  /84 (BP Location: Right arm, Patient Position: Sitting, Cuff Size: Adult Regular)   Pulse 64   Temp 97.7  F (36.5  C) (Temporal)   Resp 15   Ht 1.549 m (5' 1\")   Wt 59.9 kg (132 lb)   SpO2 96%   BMI 24.94 kg/m     Estimated body mass index is 24.94 kg/m  as calculated from the following:    Height as of this encounter: 1.549 m (5' 1\").    Weight as of this encounter: 59.9 kg (132 lb).    Physical Exam  Constitutional:       General: She is not in acute distress.     Appearance: She is well-developed.   HENT:      Right Ear: Tympanic membrane and external ear normal.      Left Ear: Tympanic membrane and external ear normal.      Nose: Nose normal.   Eyes:      General:         Right eye: No discharge.         Left eye: No discharge.      Conjunctiva/sclera: Conjunctivae normal.   Neck:      Thyroid: No thyroid mass.   Cardiovascular:      Rate and Rhythm: Normal rate and regular rhythm.      Heart sounds: Normal heart sounds, S1 normal and S2 normal. No murmur heard.  Pulmonary:      Effort: Pulmonary effort is " normal. No respiratory distress.      Breath sounds: Normal breath sounds. No wheezing or rales.   Abdominal:      General: Bowel sounds are normal.      Palpations: Abdomen is soft. There is no mass.      Tenderness: There is no abdominal tenderness.   Musculoskeletal:         General: Normal range of motion.      Cervical back: Neck supple.   Lymphadenopathy:      Cervical: No cervical adenopathy.   Skin:     General: Skin is warm and dry.      Findings: No rash.   Neurological:      Mental Status: She is alert and oriented to person, place, and time.   Psychiatric:         Mood and Affect: Mood normal.               1/14/2025   Mini Cog   Clock Draw Score 0 Abnormal   3 Item Recall 3 objects recalled   Mini Cog Total Score 3              Signed Electronically by: Jacqueline Brandt MD    Answers submitted by the patient for this visit:  Patient Health Questionnaire (Submitted on 1/14/2025)  If you checked off any problems, how difficult have these problems made it for you to do your work, take care of things at home, or get along with other people?: Somewhat difficult  PHQ9 TOTAL SCORE: 4

## 2025-01-14 NOTE — PATIENT INSTRUCTIONS
Patient Education   Preventive Care Advice   This is general advice given by our system to help you stay healthy. However, your care team may have specific advice just for you. Please talk to your care team about your preventive care needs.  Nutrition  Eat 5 or more servings of fruits and vegetables each day.  Try wheat bread, brown rice and whole grain pasta (instead of white bread, rice, and pasta).  Get enough calcium and vitamin D. Check the label on foods and aim for 100% of the RDA (recommended daily allowance).  Lifestyle  Exercise at least 150 minutes each week  (30 minutes a day, 5 days a week).  Do muscle strengthening activities 2 days a week. These help control your weight and prevent disease.  No smoking.  Wear sunscreen to prevent skin cancer.  Have a dental exam and cleaning every 6 months.  Yearly exams  See your health care team every year to talk about:  Any changes in your health.  Any medicines your care team has prescribed.  Preventive care, family planning, and ways to prevent chronic diseases.  Shots (vaccines)   HPV shots (up to age 26), if you've never had them before.  Hepatitis B shots (up to age 59), if you've never had them before.  COVID-19 shot: Get this shot when it's due.  Flu shot: Get a flu shot every year.  Tetanus shot: Get a tetanus shot every 10 years.  Pneumococcal, hepatitis A, and RSV shots: Ask your care team if you need these based on your risk.  Shingles shot (for age 50 and up)  General health tests  Diabetes screening:  Starting at age 35, Get screened for diabetes at least every 3 years.  If you are younger than age 35, ask your care team if you should be screened for diabetes.  Cholesterol test: At age 39, start having a cholesterol test every 5 years, or more often if advised.  Bone density scan (DEXA): At age 50, ask your care team if you should have this scan for osteoporosis (brittle bones).  Hepatitis C: Get tested at least once in your life.  STIs (sexually  transmitted infections)  Before age 24: Ask your care team if you should be screened for STIs.  After age 24: Get screened for STIs if you're at risk. You are at risk for STIs (including HIV) if:  You are sexually active with more than one person.  You don't use condoms every time.  You or a partner was diagnosed with a sexually transmitted infection.  If you are at risk for HIV, ask about PrEP medicine to prevent HIV.  Get tested for HIV at least once in your life, whether you are at risk for HIV or not.  Cancer screening tests  Cervical cancer screening: If you have a cervix, begin getting regular cervical cancer screening tests starting at age 21.  Breast cancer scan (mammogram): If you've ever had breasts, begin having regular mammograms starting at age 40. This is a scan to check for breast cancer.  Colon cancer screening: It is important to start screening for colon cancer at age 45.  Have a colonoscopy test every 10 years (or more often if you're at risk) Or, ask your provider about stool tests like a FIT test every year or Cologuard test every 3 years.  To learn more about your testing options, visit:   .  For help making a decision, visit:   https://bit.ly/od38426.  Prostate cancer screening test: If you have a prostate, ask your care team if a prostate cancer screening test (PSA) at age 55 is right for you.  Lung cancer screening: If you are a current or former smoker ages 50 to 80, ask your care team if ongoing lung cancer screenings are right for you.  For informational purposes only. Not to replace the advice of your health care provider. Copyright   2023 [facility]. All rights reserved. Clinically reviewed by the [facility] Transitions [facility]. [place] [id number] - REV 01/24.  Preventing Falls: Care Instructions  Injuries and health problems such as trouble walking or poor eyesight can increase your risk of falling. So can some medicines. But there are things you can do to help  "prevent falls. You can exercise to get stronger. You can also arrange your home to make it safer.    Talk to your doctor about the medicines you take. Ask if any of them increase the risk of falls and whether they can be changed or stopped.   Try to exercise regularly. It can help improve your strength and balance. This can help lower your risk of falling.         Practice fall safety and prevention.   Wear low-heeled shoes that fit well and give your feet good support. Talk to your doctor if you have foot problems that make this hard.  Carry a cellphone or wear a medical alert device that you can use to call for help.  Use stepladders instead of chairs to reach high objects. Don't climb if you're at risk for falls. Ask for help, if needed.  Wear the correct eyeglasses, if you need them.        Make your home safer.   Remove rugs, cords, clutter, and furniture from walkways.  Keep your house well lit. Use night-lights in hallways and bathrooms.  Install and use sturdy handrails on stairways.  Wear nonskid footwear, even inside. Don't walk barefoot or in socks without shoes.        Be safe outside.   Use handrails, curb cuts, and ramps whenever possible.  Keep your hands free by using a shoulder bag or backpack.  Try to walk in well-lit areas. Watch out for uneven ground, changes in pavement, and debris.  Be careful in the winter. Walk on the grass or gravel when sidewalks are slippery. Use de-icer on steps and walkways. Add non-slip devices to shoes.    Put grab bars and nonskid mats in your shower or tub and near the toilet. Try to use a shower chair or bath bench when bathing.   Get into a tub or shower by putting in your weaker leg first. Get out with your strong side first. Have a phone or medical alert device in the bathroom with you.   Where can you learn more?  Go to https://www.[employer].net/patiented  Enter G117 in the search box to learn more about \"Preventing Falls: Care Instructions.\"  Current as of: " July 31, 2024  Content Version: 14.3    2024 [employer].   Care instructions adapted under license by your healthcare professional. If you have questions about a medical condition or this instruction, always ask your healthcare professional. [employer] disclaims any warranty or liability for your use of this information.    Hearing Loss: Care Instructions  Overview     Hearing loss is a sudden or slow decrease in how well you hear. It can range from slight to profound. Permanent hearing loss can occur with aging. It also can happen when you are exposed long-term to loud noise. Examples include listening to loud music, riding motorcycles, or being around other loud machines.  Hearing loss can affect your work and home life. It can make you feel lonely or depressed. You may feel that you have lost your independence. But hearing aids and other devices can help you hear better and feel connected to others.  Follow-up care is a key part of your treatment and safety. Be sure to make and go to all appointments, and call your doctor if you are having problems. It's also a good idea to know your test results and keep a list of the medicines you take.  How can you care for yourself at home?  Avoid loud noises whenever possible. This helps keep your hearing from getting worse.  Always wear hearing protection around loud noises.  Wear a hearing aid as directed.  A professional can help you pick a hearing aid that will work best for you.  You can also get hearing aids over the counter for mild to moderate hearing loss.  Have hearing tests as your doctor suggests. They can show whether your hearing has changed. Your hearing aid may need to be adjusted.  Use other devices as needed. These may include:  Telephone amplifiers and hearing aids that can connect to a television, stereo, radio, or microphone.  Devices that use lights or vibrations. These alert you to the doorbell, a ringing telephone, or a baby  "monitor.  Television closed-captioning. This shows the words at the bottom of the screen. Most new TVs can do this.  TTY (text telephone). This lets you type messages back and forth on the telephone instead of talking or listening. These devices are also called TDD. When messages are typed on the keyboard, they are sent over the phone line to a receiving TTY. The message is shown on a monitor.  Use text messaging, social media, and email if it is hard for you to communicate by telephone.  Try to learn a listening technique called speechreading. It is not lipreading. You pay attention to people's gestures, expressions, posture, and tone of voice. These clues can help you understand what a person is saying. Face the person you are talking to, and have them face you. Make sure the lighting is good. You need to see the other person's face clearly.  Think about counseling if you need help to adjust to your hearing loss.  When should you call for help?  Watch closely for changes in your health, and be sure to contact your doctor if:    You think your hearing is getting worse.     You have new symptoms, such as dizziness or nausea.   Where can you learn more?  Go to https://www.Lightwire.net/patiented  Enter R798 in the search box to learn more about \"Hearing Loss: Care Instructions.\"  Current as of: September 27, 2023  Content Version: 14.3    2024 [employer].   Care instructions adapted under license by your healthcare professional. If you have questions about a medical condition or this instruction, always ask your healthcare professional. [employer] disclaims any warranty or liability for your use of this information.    Learning About Stress  What is stress?     Stress is your body's response to a hard situation. Your body can have a physical, emotional, or mental response. Stress is a fact of life for most people, and it affects everyone differently. What causes stress for you may not be " stressful for someone else.  A lot of things can cause stress. You may feel stress when you go on a job interview, take a test, or run a race. This kind of short-term stress is normal and even useful. It can help you if you need to work hard or react quickly. For example, stress can help you finish an important job on time.  Long-term stress is caused by ongoing stressful situations or events. Examples of long-term stress include long-term health problems, ongoing problems at work, or conflicts in your family. Long-term stress can harm your health.  How does stress affect your health?  When you are stressed, your body responds as though you are in danger. It makes hormones that speed up your heart, make you breathe faster, and give you a burst of energy. This is called the fight-or-flight stress response. If the stress is over quickly, your body goes back to normal and no harm is done.  But if stress happens too often or lasts too long, it can have bad effects. Long-term stress can make you more likely to get sick, and it can make symptoms of some diseases worse. If you tense up when you are stressed, you may develop neck, shoulder, or low back pain. Stress is linked to high blood pressure and heart disease.  Stress also harms your emotional health. It can make you tran, tense, or depressed. Your relationships may suffer, and you may not do well at work or school.  What can you do to manage stress?  You can try these things to help manage stress:   Do something active. Exercise or activity can help reduce stress. Walking is a great way to get started. Even everyday activities such as housecleaning or yard work can help.  Try yoga or naveen chi. These techniques combine exercise and meditation. You may need some training at first to learn them.  Do something you enjoy. For example, listen to music or go to a movie. Practice your hobby or do volunteer work.  Meditate. This can help you relax, because you are not  "worrying about what happened before or what may happen in the future.  Do guided imagery. Imagine yourself in any setting that helps you feel calm. You can use online videos, books, or a teacher to guide you.  Do breathing exercises. For example:  From a standing position, bend forward from the waist with your knees slightly bent. Let your arms dangle close to the floor.  Breathe in slowly and deeply as you return to a standing position. Roll up slowly and lift your head last.  Hold your breath for just a few seconds in the standing position.  Breathe out slowly and bend forward from the waist.  Let your feelings out. Talk, laugh, cry, and express anger when you need to. Talking with supportive friends or family, a counselor, or a rl leader about your feelings is a healthy way to relieve stress. Avoid discussing your feelings with people who make you feel worse.  Write. It may help to write about things that are bothering you. This helps you find out how much stress you feel and what is causing it. When you know this, you can find better ways to cope.  What can you do to prevent stress?  You might try some of these things to help prevent stress:  Manage your time. This helps you find time to do the things you want and need to do.  Get enough sleep. Your body recovers from the stresses of the day while you are sleeping.  Get support. Your family, friends, and community can make a difference in how you experience stress.  Limit your news feed. Avoid or limit time on social media or news that may make you feel stressed.  Do something active. Exercise or activity can help reduce stress. Walking is a great way to get started.  Where can you learn more?  Go to https://www.[employer]ius.net/patiented  Enter N032 in the search box to learn more about \"Learning About Stress.\"  Current as of: October 24, 2023  Content Version: 14.3    2024 [employer].   Care instructions adapted under license by [facility] " professional. If you have questions about a medical condition or this instruction, always ask your healthcare professional. [employer] disclaims any warranty or liability for your use of this information.    Bladder Training: Care Instructions  Your Care Instructions     Bladder training is used to treat urge incontinence and stress incontinence. Urge incontinence means that the need to urinate comes on so fast that you can't get to a toilet in time. Stress incontinence means that you leak urine because of pressure on your bladder. For example, it may happen when you laugh, cough, or lift something heavy.  Bladder training can increase how long you can wait before you have to urinate. It can also help your bladder hold more urine. And it can give you better control over the urge to urinate.  It is important to remember that bladder training takes a few weeks to a few months to make a difference. You may not see results right away, but don't give up.  Follow-up care is a key part of your treatment and safety. Be sure to make and go to all appointments, and call your doctor if you are having problems. It's also a good idea to know your test results and keep a list of the medicines you take.  How can you care for yourself at home?  Work with your doctor to come up with a bladder training program that is right for you. You may use one or more of the following methods.  Delayed urination  In the beginning, try to keep from urinating for 5 minutes after you first feel the need to go.  While you wait, take deep, slow breaths to relax. Kegel exercises can also help you delay the need to go to the bathroom.  After some practice, when you can easily wait 5 minutes to urinate, try to wait 10 minutes before you urinate.  Slowly increase the waiting period until you are able to control when you have to urinate.  Scheduled urination  Empty your bladder when you first wake up in the morning.  Schedule times throughout  "the day when you will urinate.  Start by going to the bathroom every hour, even if you don't need to go.  Slowly increase the time between trips to the bathroom.  When you have found a schedule that works well for you, keep doing it.  If you wake up during the night and have to urinate, do it. Apply your schedule to waking hours only.  Kegel exercises  These tighten and strengthen pelvic muscles, which can help you control the flow of urine. (If doing these exercises causes pain, stop doing them and talk with your doctor.) To do Kegel exercises:  Squeeze your muscles as if you were trying not to pass gas. Or squeeze your muscles as if you were stopping the flow of urine. Your belly, legs, and buttocks shouldn't move.  Hold the squeeze for 3 seconds, then relax for 5 to 10 seconds.  Start with 3 seconds, then add 1 second each week until you are able to squeeze for 10 seconds.  Repeat the exercise 10 times a session. Do 3 to 8 sessions a day.  When should you call for help?  Watch closely for changes in your health, and be sure to contact your doctor if:    Your incontinence is getting worse.     You do not get better as expected.   Where can you learn more?  Go to https://www.OpDemand.net/patiented  Enter V684 in the search box to learn more about \"Bladder Training: Care Instructions.\"  Current as of: April 30, 2024  Content Version: 14.3    2024 [employer].   Care instructions adapted under license by your healthcare professional. If you have questions about a medical condition or this instruction, always ask your healthcare professional. [employer] disclaims any warranty or liability for your use of this information.       "

## 2025-02-06 DIAGNOSIS — K59.00 CONSTIPATION, UNSPECIFIED CONSTIPATION TYPE: ICD-10-CM

## 2025-02-06 RX ORDER — DOCUSATE SODIUM 50 MG AND SENNOSIDES 8.6 MG 8.6; 5 MG/1; MG/1
2 TABLET, FILM COATED ORAL DAILY
Qty: 180 TABLET | Refills: 2 | Status: SHIPPED | OUTPATIENT
Start: 2025-02-06

## 2025-02-06 NOTE — TELEPHONE ENCOUNTER
Prescription approved per [facility] Refill Protocol.  Neda Triplett RN  [facility]

## 2025-03-18 ENCOUNTER — MYC MEDICAL ADVICE (OUTPATIENT)
Dept: FAMILY MEDICINE | Facility: CLINIC | Age: 89
End: 2025-03-18
Payer: MEDICARE

## 2025-03-18 ENCOUNTER — TELEPHONE (OUTPATIENT)
Dept: FAMILY MEDICINE | Facility: OTHER | Age: 89
End: 2025-03-18
Payer: MEDICARE

## 2025-03-18 DIAGNOSIS — E03.9 HYPOTHYROIDISM, UNSPECIFIED TYPE: Primary | ICD-10-CM

## 2025-03-18 NOTE — TELEPHONE ENCOUNTER
"S-(situation): Patient daughter, c2c, calling to inquire if medicaiton could be causing symptoms. Medication being levothyroxine.     B-(background): Patient ran out of medication for a week and symptoms resolved. When reordered and restarted symptoms returned. On 88 mcg. Last TSH normal in January.     A-(assessment): Patient reported feeling crummy and just not in \"tip top shape\". But improved when stopped taking medication. Patient reports that after restarting medication (which she is taking first thing in AM on empty stomach) she has developed some GI upset along with feeling crummy again. She is struggling to have BM, feeling bloated, then when having BM more loose. RN inquired if change in  occurred. Patient unsure of brand, but reports the pill feels \"more rough\" in texture and \"are gray now\". Rn advised to ask pharmacy if there was a change from name brand to generic etc. Daughter will reply via [employer] with update.     R-(recommendations): Please advise if symptoms could be related to medication. They have been ongoing symptoms. Would patient need evaluation to pursue further or could try stopping medication to see if symptoms resolve again?     RN team: Daughter requesting callback first, if not able to answer, please leave  and sent [employer] reply. She will reach then.     Yunier Proctor RN on 3/18/2025 at 2:33 PM      "

## 2025-03-18 NOTE — TELEPHONE ENCOUNTER
Her last TSH was stable.  Sometimes taking med on empty stomach can be upsetting but it is recommended to have better absorption. They can certainly stop it and recheck TSH in 4 weeks to see how bad the hypothyroidism is.  Hypothyroidism can cause constipation issues.     Maday Coles PA-C

## 2025-03-19 NOTE — TELEPHONE ENCOUNTER
RN including that patient replied to MyChart. No change in brand, so no concern for brand vs. Generic reaction.     Yunier Proctor RN on 3/19/2025 at 11:05 AM

## 2025-03-19 NOTE — TELEPHONE ENCOUNTER
RN called daughter to discuss. She is going to talk with patient and she thinks they will stop the medication as suggested below and recheck lab work in a few weeks.     Please place lab orders.     CHRISTINA Key, RN

## 2025-03-30 ENCOUNTER — HOSPITAL ENCOUNTER (EMERGENCY)
Facility: CLINIC | Age: 89
Discharge: HOME | End: 2025-03-30
Attending: PHYSICIAN ASSISTANT | Admitting: PHYSICIAN ASSISTANT
Payer: MEDICARE

## 2025-03-30 ENCOUNTER — APPOINTMENT (OUTPATIENT)
Dept: CT IMAGING | Facility: CLINIC | Age: 89
End: 2025-03-30
Attending: PHYSICIAN ASSISTANT
Payer: MEDICARE

## 2025-03-30 VITALS
TEMPERATURE: 97.9 F | OXYGEN SATURATION: 95 % | WEIGHT: 132 LBS | DIASTOLIC BLOOD PRESSURE: 81 MMHG | HEART RATE: 68 BPM | RESPIRATION RATE: 20 BRPM | HEIGHT: 62 IN | SYSTOLIC BLOOD PRESSURE: 170 MMHG | BODY MASS INDEX: 24.29 KG/M2

## 2025-03-30 DIAGNOSIS — M62.838 NECK MUSCLE SPASM: ICD-10-CM

## 2025-03-30 LAB
ANION GAP SERPL CALCULATED.3IONS-SCNC: 12 MMOL/L (ref 7–15)
BASOPHILS # BLD AUTO: 0 10E3/UL (ref 0–0.2)
BASOPHILS NFR BLD AUTO: 1 %
BUN SERPL-MCNC: 13.7 MG/DL (ref 8–23)
CALCIUM SERPL-MCNC: 9.2 MG/DL (ref 8.8–10.4)
CHLORIDE SERPL-SCNC: 101 MMOL/L (ref 98–107)
CREAT SERPL-MCNC: 0.86 MG/DL (ref 0.51–0.95)
EGFRCR SERPLBLD CKD-EPI 2021: 64 ML/MIN/1.73M2
EOSINOPHIL # BLD AUTO: 0.1 10E3/UL (ref 0–0.7)
EOSINOPHIL NFR BLD AUTO: 1 %
ERYTHROCYTE [DISTWIDTH] IN BLOOD BY AUTOMATED COUNT: 12.5 % (ref 10–15)
GLUCOSE SERPL-MCNC: 106 MG/DL (ref 70–99)
HCO3 SERPL-SCNC: 25 MMOL/L (ref 22–29)
HCT VFR BLD AUTO: 36.9 % (ref 35–47)
HGB BLD-MCNC: 12.3 G/DL (ref 11.7–15.7)
IMM GRANULOCYTES # BLD: 0 10E3/UL
IMM GRANULOCYTES NFR BLD: 0 %
LYMPHOCYTES # BLD AUTO: 1.5 10E3/UL (ref 0.8–5.3)
LYMPHOCYTES NFR BLD AUTO: 18 %
MCH RBC QN AUTO: 31.8 PG (ref 26.5–33)
MCHC RBC AUTO-ENTMCNC: 33.3 G/DL (ref 31.5–36.5)
MCV RBC AUTO: 95 FL (ref 78–100)
MONOCYTES # BLD AUTO: 0.7 10E3/UL (ref 0–1.3)
MONOCYTES NFR BLD AUTO: 9 %
NEUTROPHILS # BLD AUTO: 6 10E3/UL (ref 1.6–8.3)
NEUTROPHILS NFR BLD AUTO: 72 %
NRBC # BLD AUTO: 0 10E3/UL
NRBC BLD AUTO-RTO: 0 /100
PLATELET # BLD AUTO: 190 10E3/UL (ref 150–450)
POTASSIUM SERPL-SCNC: 4.5 MMOL/L (ref 3.4–5.3)
RBC # BLD AUTO: 3.87 10E6/UL (ref 3.8–5.2)
SODIUM SERPL-SCNC: 138 MMOL/L (ref 135–145)
T4 FREE SERPL-MCNC: 1.06 NG/DL (ref 0.9–1.7)
TSH SERPL DL<=0.005 MIU/L-ACNC: 9.04 UIU/ML (ref 0.3–4.2)
WBC # BLD AUTO: 8.3 10E3/UL (ref 4–11)

## 2025-03-30 PROCEDURE — 72125 CT NECK SPINE W/O DYE: CPT

## 2025-03-30 PROCEDURE — 85004 AUTOMATED DIFF WBC COUNT: CPT | Performed by: PHYSICIAN ASSISTANT

## 2025-03-30 PROCEDURE — 84443 ASSAY THYROID STIM HORMONE: CPT | Performed by: PHYSICIAN ASSISTANT

## 2025-03-30 PROCEDURE — 250N000013 HC RX MED GY IP 250 OP 250 PS 637: Performed by: PHYSICIAN ASSISTANT

## 2025-03-30 PROCEDURE — 99284 EMERGENCY DEPT VISIT MOD MDM: CPT | Mod: 25

## 2025-03-30 PROCEDURE — 80048 BASIC METABOLIC PNL TOTAL CA: CPT | Performed by: PHYSICIAN ASSISTANT

## 2025-03-30 PROCEDURE — 36415 COLL VENOUS BLD VENIPUNCTURE: CPT | Performed by: PHYSICIAN ASSISTANT

## 2025-03-30 PROCEDURE — 85014 HEMATOCRIT: CPT | Performed by: PHYSICIAN ASSISTANT

## 2025-03-30 PROCEDURE — 70450 CT HEAD/BRAIN W/O DYE: CPT

## 2025-03-30 PROCEDURE — 99284 EMERGENCY DEPT VISIT MOD MDM: CPT | Performed by: PHYSICIAN ASSISTANT

## 2025-03-30 PROCEDURE — 84439 ASSAY OF FREE THYROXINE: CPT | Performed by: PHYSICIAN ASSISTANT

## 2025-03-30 PROCEDURE — 80051 ELECTROLYTE PANEL: CPT | Performed by: PHYSICIAN ASSISTANT

## 2025-03-30 RX ORDER — METHOCARBAMOL 500 MG/1
500 TABLET, FILM COATED ORAL 4 TIMES DAILY PRN
Qty: 20 TABLET | Refills: 0 | Status: SHIPPED | OUTPATIENT
Start: 2025-03-30

## 2025-03-30 RX ORDER — METHOCARBAMOL 500 MG/1
500 TABLET, FILM COATED ORAL 4 TIMES DAILY PRN
Qty: 4 TABLET | Refills: 0 | Status: SHIPPED | OUTPATIENT
Start: 2025-03-30

## 2025-03-30 RX ORDER — HYDROCODONE BITARTRATE AND ACETAMINOPHEN 5; 325 MG/1; MG/1
1 TABLET ORAL ONCE
Status: COMPLETED | OUTPATIENT
Start: 2025-03-30 | End: 2025-03-30

## 2025-03-30 RX ORDER — METHOCARBAMOL 500 MG/1
500 TABLET, FILM COATED ORAL 4 TIMES DAILY PRN
Status: DISCONTINUED | OUTPATIENT
Start: 2025-03-30 | End: 2025-03-30 | Stop reason: HOSPADM

## 2025-03-30 RX ADMIN — HYDROCODONE BITARTRATE AND ACETAMINOPHEN 1 TABLET: 5; 325 TABLET ORAL at 14:23

## 2025-03-30 RX ADMIN — METHOCARBAMOL TABLETS 500 MG: 500 TABLET, COATED ORAL at 16:47

## 2025-03-30 ASSESSMENT — COLUMBIA-SUICIDE SEVERITY RATING SCALE - C-SSRS
2. HAVE YOU ACTUALLY HAD ANY THOUGHTS OF KILLING YOURSELF IN THE PAST MONTH?: NO
1. IN THE PAST MONTH, HAVE YOU WISHED YOU WERE DEAD OR WISHED YOU COULD GO TO SLEEP AND NOT WAKE UP?: NO
6. HAVE YOU EVER DONE ANYTHING, STARTED TO DO ANYTHING, OR PREPARED TO DO ANYTHING TO END YOUR LIFE?: YES

## 2025-03-30 ASSESSMENT — ACTIVITIES OF DAILY LIVING (ADL)
ADLS_ACUITY_SCORE: 41

## 2025-03-30 NOTE — DISCHARGE INSTRUCTIONS
Your test results did not show any fractures in the neck.  I think your pain is related to tight muscles.  Use ibuprofen or Tylenol for pain relief.  You can use the Robaxin prescribed for muscle spasm relief as well.  This can make you sleepy so be careful taking this to prevent falls.  I placed a referral to the physical therapy team for further evaluation and management of your tight neck.    Your thyroid hormone overall looked okay.  I think it is reasonable to continue holding your medication if you are feeling well and discuss this further with your primary care provider on Tuesday at your follow-up visit.    If you develop any new or worsening symptoms please do not hesitate to return to the emergency department.    Thank you for choosing [facility]'s Emergency Department. It was a pleasure taking care of you today. If you have any questions, please call [phone].    Inna Proctor, SIMÓN

## 2025-03-30 NOTE — ED TRIAGE NOTES
Come in with neck pain that has been on and off since March 5th after she fell in the bathroom and woke up on the floor.   States also went off her thyroid medication for about a week, per  After her other daughter talked with the doctor about her medication.        Triage Assessment (Adult)       Row Name 03/30/25 1300          Triage Assessment    Airway WDL WDL        Respiratory WDL    Respiratory WDL WDL        Skin Circulation/Temperature WDL    Skin Circulation/Temperature WDL WDL        Cardiac WDL    Cardiac WDL WDL        Cognitive/Neuro/Behavioral WDL    Cognitive/Neuro/Behavioral WDL WDL        Tabatha Coma Scale    Best Eye Response 4-->(E4) spontaneous     Best Motor Response 6-->(M6) obeys commands     Best Verbal Response 5-->(V5) oriented     Tabatha Coma Scale Score 15

## 2025-03-30 NOTE — ED PROVIDER NOTES
History     Chief Complaint   Patient presents with    Neck Pain       HPI  Erwin Tolliver is a 89 year old female who presents to the emergency department complaining of neck pain. The patient is here with her daughter.  Patient reports on March 5 she had fallen asleep in her chair, woke up and realized the time and was walking to the bathroom to get ready for bed.  The next thing she remembers is waking up on the floor in the bathroom.  She thinks she must of passed out but does not recall why or what happened.  She was able to get herself up and go to bed.  Her daughter came over the next day and patient had some soreness in her right arm but otherwise was feeling okay so she did not come in to get checked.  Since then however she has been having intermittent neck pain.  Initially it was on the right side but now its on both sides of her neck.  She is also been having intermittent headaches but she does have a history of migraines.  She has not had any tingling or weakness in her arms.  No visual changes reported.  No nausea or vomiting or fevers.  No URI symptoms.  Also of note a week ago she quit taking her thyroid medicine because she was having GI upset.  Her stomach upset has gotten a lot better since discontinuing it.  She does have an appointment on Tuesday with her primary care provider.  In the last 3 days her neck pain is gotten more constant and severe so she decided to come in today.  She did try a leftover Norco last night and this morning and it did help the pain very nicely.        Allergies:  Allergies   Allergen Reactions    Aspirin      headaches,tremors,nausea    Augmentin [Amoxicillin-Pot Clavulanate] Nausea and Vomiting       Problem List:    Patient Active Problem List    Diagnosis Date Noted    Chronic kidney disease, stage 2 (mild) 12/06/2022     Priority: Medium    Other chronic sinusitis 03/01/2017     Priority: Medium    Hypertension, goal below 150/90 05/05/2015     Priority:  Medium    Macular degeneration (senile) of retina 2014     Priority: Medium    Moderate recurrent major depression (H) 10/01/2013     Priority: Medium    Atopic rhinitis 2012     Priority: Medium    GERD (gastroesophageal reflux disease) 2011     Priority: Medium    Osteopenia 11/15/2010     Priority: Medium    Hyperlipidemia, unspecified 10/31/2010     Priority: Medium    Hypothyroidism 2006     Priority: Medium    Migraine with aura      Priority: Medium        Past Medical History:    Past Medical History:   Diagnosis Date    Arthritis     CKD (chronic kidney disease) stage 3, GFR 30-59 ml/min (H) 8/15/2012    Depressive disorder     Depressive disorder, not elsewhere classified     Hepatitis, unspecified 1993    Hypertension     Migraine with aura, without mention of intractable migraine without mention of status migrainosus     Need for prophylactic hormone replacement therapy (postmenopausal)     Palpitations     SVT (supraventricular tachycardia) 10/8/2012    Thyroid disease     Unspecified essential hypertension        Past Surgical History:    Past Surgical History:   Procedure Laterality Date    APPENDECTOMY      BACK SURGERY      bulged disc    COLONOSCOPY      ENT SURGERY      EYE SURGERY      HC KNEE SCOPE, DIAGNOSTIC      Arthroscopy, Knee    HC REDUCTION OF LARGE BREAST      ORTHOPEDIC SURGERY      SEPTOPLASTY N/A 2017    Procedure: SEPTOPLASTY;  Septoplasty, Submucosal resection of the turbinates;  Surgeon: Edwin Cox MD;  Location: [facility]    SHOULDER SURGERY  2019    Z TOTAL ABDOM HYSTERECTOMY      Hysterectomy, Total Abdominal and BSO -benign - age 41    [facility]       Family History:    Family History   Problem Relation Age of Onset    Alzheimer Disease Mother     Arthritis Sister          at 72 years    Lipids Sister     Osteoporosis Sister         osteoporosis    Genetic Disorder Sister         down syndrome     "Alzheimer Disease Sister     Thyroid Disease Sister     Cardiovascular Brother          at 72 years of CHF       Social History:  Marital Status:   [5]  Social History     Tobacco Use    Smoking status: Former     Current packs/day: 0.00     Average packs/day: 0.5 packs/day for 10.0 years (5.0 ttl pk-yrs)     Types: Cigarettes     Start date: 1956     Quit date: 1966     Years since quittin.2    Smokeless tobacco: Never    Tobacco comments:     no smokers in household   Vaping Use    Vaping status: Never Used   Substance Use Topics    Alcohol use: No     Alcohol/week: 1.7 standard drinks of alcohol     Comment: beerx2/ x8per month    Drug use: No        Medications:    methocarbamol (ROBAXIN) 500 MG tablet  methocarbamol (ROBAXIN) 500 MG tablet  escitalopram (LEXAPRO) 10 MG tablet  ipratropium (ATROVENT) 0.03 % nasal spray  levothyroxine (SYNTHROID/LEVOTHROID) 88 MCG tablet  losartan (COZAAR) 50 MG tablet  metoprolol succinate ER (TOPROL XL) 100 MG 24 hr tablet  Multiple Vitamins-Minerals (PRESERVISION AREDS PO)  nitroGLYcerin (NITROSTAT) 0.4 MG sublingual tablet  omeprazole (PRILOSEC) 20 MG DR capsule  simvastatin (ZOCOR) 20 MG tablet  STIMULANT LAXATIVE 8.6-50 MG tablet  SUMAtriptan (IMITREX) 25 MG tablet  traZODone (DESYREL) 100 MG tablet          Review of Systems   All other systems reviewed and are negative.          Physical Exam   BP: 133/79  Pulse: 69  Temp: 97.9  F (36.6  C)  Resp: 20  Height: 157.5 cm (5' 2\")  Weight: 59.9 kg (132 lb)  SpO2: 98 %      Physical Exam  Vitals and nursing note reviewed.   Constitutional:       General: She is not in acute distress.     Appearance: Normal appearance. She is well-developed. She is not toxic-appearing or diaphoretic.   HENT:      Head: Normocephalic and atraumatic.      Nose: Nose normal.      Mouth/Throat:      Mouth: Mucous membranes are moist.      Pharynx: Oropharynx is clear.   Eyes:      Extraocular Movements: Extraocular " movements intact.      Conjunctiva/sclera: Conjunctivae normal.      Pupils: Pupils are equal, round, and reactive to light.   Neck:      Comments: Paraspinous cervical musculature is very tense and tight and tender to palpation.  Tenderness throughout the midline cervical spine as well.  Cardiovascular:      Rate and Rhythm: Normal rate and regular rhythm.      Pulses: Normal pulses.      Heart sounds: Normal heart sounds.   Pulmonary:      Effort: Pulmonary effort is normal. No respiratory distress.      Breath sounds: Normal breath sounds.   Abdominal:      General: Bowel sounds are normal. There is no distension.      Palpations: Abdomen is soft.      Tenderness: There is no abdominal tenderness.   Musculoskeletal:         General: No deformity.      Comments: No tenderness in the midline thoracic or lumbar spine.  Normal  strength in bilateral upper extremities.  Normal radial pulses.   Skin:     General: Skin is warm and dry.   Neurological:      General: No focal deficit present.      Mental Status: She is alert and oriented to person, place, and time. Mental status is at baseline.      Coordination: Coordination normal.   Psychiatric:         Mood and Affect: Mood normal.             ED Course        Procedures      Results for orders placed or performed during the hospital encounter of 03/30/25 (from the past 24 hours)   CBC with platelets differential    Narrative    The following orders were created for panel order CBC with platelets differential.  Procedure                               Abnormality         Status                     ---------                               -----------         ------                     CBC with platelets and ...[[id number]]                      Final result                 Please view results for these tests on the individual orders.   Basic metabolic panel   Result Value Ref Range    Sodium 138 135 - 145 mmol/L    Potassium 4.5 3.4 - 5.3 mmol/L    Chloride 101 98 -  107 mmol/L    Carbon Dioxide (CO2) 25 22 - 29 mmol/L    Anion Gap 12 7 - 15 mmol/L    Urea Nitrogen 13.7 8.0 - 23.0 mg/dL    Creatinine 0.86 0.51 - 0.95 mg/dL    GFR Estimate 64 >60 mL/min/1.73m2    Calcium 9.2 8.8 - 10.4 mg/dL    Glucose 106 (H) 70 - 99 mg/dL   TSH with free T4 reflex   Result Value Ref Range    TSH 9.04 (H) 0.30 - 4.20 uIU/mL   CBC with platelets and differential   Result Value Ref Range    WBC Count 8.3 4.0 - 11.0 10e3/uL    RBC Count 3.87 3.80 - 5.20 10e6/uL    Hemoglobin 12.3 11.7 - 15.7 g/dL    Hematocrit 36.9 35.0 - 47.0 %    MCV 95 78 - 100 fL    MCH 31.8 26.5 - 33.0 pg    MCHC 33.3 31.5 - 36.5 g/dL    RDW 12.5 10.0 - 15.0 %    Platelet Count 190 150 - 450 10e3/uL    % Neutrophils 72 %    % Lymphocytes 18 %    % Monocytes 9 %    % Eosinophils 1 %    % Basophils 1 %    % Immature Granulocytes 0 %    NRBCs per 100 WBC 0 <1 /100    Absolute Neutrophils 6.0 1.6 - 8.3 10e3/uL    Absolute Lymphocytes 1.5 0.8 - 5.3 10e3/uL    Absolute Monocytes 0.7 0.0 - 1.3 10e3/uL    Absolute Eosinophils 0.1 0.0 - 0.7 10e3/uL    Absolute Basophils 0.0 0.0 - 0.2 10e3/uL    Absolute Immature Granulocytes 0.0 <=0.4 10e3/uL    Absolute NRBCs 0.0 10e3/uL   T4 free   Result Value Ref Range    Free T4 1.06 0.90 - 1.70 ng/dL   CT Head w/o Contrast    Narrative    EXAM: CT HEAD W/O CONTRAST, CT CERVICAL SPINE W/O CONTRAST  LOCATION: [facility]  DATE: 3/30/2025    INDICATION: fall, headaches  COMPARISON: Head and cervical spine CT: 4/10/2020.  TECHNIQUE:   1) Routine CT Head without IV contrast. Multiplanar reformats. Dose reduction techniques were used.  2) Routine CT Cervical Spine without IV contrast. Multiplanar reformats. Dose reduction techniques were used.    FINDINGS:   HEAD CT:   INTRACRANIAL CONTENTS: No intracranial hemorrhage, extraaxial collection, or mass effect.  No CT evidence of acute infarct. Mild to moderate presumed chronic small vessel ischemic changes. Moderate  generalized volume loss. No hydrocephalus. Calcified   intracranial atherosclerosis.     VISUALIZED ORBITS/SINUSES/MASTOIDS: No intraorbital abnormality. No paranasal sinus mucosal disease. No middle ear or mastoid effusion.    BONES/SOFT TISSUES: No acute abnormality. Advanced left temporomandibular joint degenerative changes.    CERVICAL SPINE CT:   VERTEBRA: No acute fracture or traumatic malalignment involving the cervical spine. Vertebral body heights are similar to prior. Similar mild degenerative anterolisthesis of C4 on C5.    CANAL/FORAMINA: Multilevel degenerative disc disease with disc height loss greatest and moderate to advanced at C5-C6. Multilevel facet arthropathy. No high-grade spinal canal stenosis. Varying degrees of neural foraminal stenosis in the setting of   uncinate spurring and facet hypertrophy. This is greatest and moderate to advanced on the left at C3-C4 and bilaterally at C5-C6.    PARASPINAL: No prevertebral soft tissue edema. Calcified atherosclerosis of the cervical carotid arteries. Peripherally calcified subcentimeter nodule involving the right thyroid lobe, for which no specific imaging follow-up is recommended based on small   lesion size. Visualized lung fields are clear.      Impression    IMPRESSION:  HEAD CT:  1.  No evidence of acute traumatic intracranial abnormality.  2.  Brain atrophy and presumed chronic microvascular ischemic changes as above.    CERVICAL SPINE CT:  1.  No CT evidence for acute fracture or traumatic malalignment involving the cervical spine.  2.  Multilevel cervical spondylosis, further detailed above.      REFERENCE:  Shukri FRAUSTO et al. Managing Incidental Thyroid Nodules Detected on Imaging: White Paper of the ACR Incidental Thyroid Findings Committee. JACR 2015; 12:143-150.    Incidental thyroid nodule detected on CT or MRI without suspicious findings. Applies to general population without limited life expectancy or significant comorbidities.    Age  greater than or equal to 35 years  Less than 1.5 cm: No further evaluation.  Greater than or equal to 1.5 cm: Evaluate with thyroid ultrasound.   CT Cervical Spine w/o Contrast    Narrative    EXAM: CT HEAD W/O CONTRAST, CT CERVICAL SPINE W/O CONTRAST  LOCATION: [facility]  DATE: 3/30/2025    INDICATION: fall, headaches  COMPARISON: Head and cervical spine CT: 4/10/2020.  TECHNIQUE:   1) Routine CT Head without IV contrast. Multiplanar reformats. Dose reduction techniques were used.  2) Routine CT Cervical Spine without IV contrast. Multiplanar reformats. Dose reduction techniques were used.    FINDINGS:   HEAD CT:   INTRACRANIAL CONTENTS: No intracranial hemorrhage, extraaxial collection, or mass effect.  No CT evidence of acute infarct. Mild to moderate presumed chronic small vessel ischemic changes. Moderate generalized volume loss. No hydrocephalus. Calcified   intracranial atherosclerosis.     VISUALIZED ORBITS/SINUSES/MASTOIDS: No intraorbital abnormality. No paranasal sinus mucosal disease. No middle ear or mastoid effusion.    BONES/SOFT TISSUES: No acute abnormality. Advanced left temporomandibular joint degenerative changes.    CERVICAL SPINE CT:   VERTEBRA: No acute fracture or traumatic malalignment involving the cervical spine. Vertebral body heights are similar to prior. Similar mild degenerative anterolisthesis of C4 on C5.    CANAL/FORAMINA: Multilevel degenerative disc disease with disc height loss greatest and moderate to advanced at C5-C6. Multilevel facet arthropathy. No high-grade spinal canal stenosis. Varying degrees of neural foraminal stenosis in the setting of   uncinate spurring and facet hypertrophy. This is greatest and moderate to advanced on the left at C3-C4 and bilaterally at C5-C6.    PARASPINAL: No prevertebral soft tissue edema. Calcified atherosclerosis of the cervical carotid arteries. Peripherally calcified subcentimeter nodule involving the  right thyroid lobe, for which no specific imaging follow-up is recommended based on small   lesion size. Visualized lung fields are clear.      Impression    IMPRESSION:  HEAD CT:  1.  No evidence of acute traumatic intracranial abnormality.  2.  Brain atrophy and presumed chronic microvascular ischemic changes as above.    CERVICAL SPINE CT:  1.  No CT evidence for acute fracture or traumatic malalignment involving the cervical spine.  2.  Multilevel cervical spondylosis, further detailed above.      REFERENCE:  Shukri FRAUSTO et al. Managing Incidental Thyroid Nodules Detected on Imaging: White Paper of the ACR Incidental Thyroid Findings Committee. JACR 2015; 12:143-150.    Incidental thyroid nodule detected on CT or MRI without suspicious findings. Applies to general population without limited life expectancy or significant comorbidities.    Age greater than or equal to 35 years  Less than 1.5 cm: No further evaluation.  Greater than or equal to 1.5 cm: Evaluate with thyroid ultrasound.       Medications   methocarbamol (ROBAXIN) tablet 500 mg (500 mg Oral $Given 3/30/25 4904)   HYDROcodone-acetaminophen (NORCO) 5-325 MG per tablet 1 tablet (1 tablet Oral $Given 3/30/25 0239)         Assessments & Plan (with Medical Decision Making)  Erwin Tolliver is a 89 year old female who presented to the ED complaining of neck pain.  This has been an intermittent issue for the last 3 weeks since she fell on 5 March.  She denies any pain or weakness in her upper extremities.  Has intermittent headaches which she reports is chronic.  She has taken Norco at home which seemed to have helped.  Also mentions discontinuing her levothyroxine due to GI upset and has since felt improved.  On arrival to the ED she was afebrile with normal vital signs.  Her exam today demonstrated very tight paraspinous cervical musculature bilaterally that was tender to palpation.  She had diffuse midline cervical tenderness as well.  No tenderness in  thoracic or lumbar spine.  Normal strength in bilateral upper extremities.  Rest of exam benign.  Patient was given Norco here for pain control.  We did recheck her TSH which was elevated at 9.04 however free T4 is normal at 1.06.  Since she is feeling improved off of her levothyroxine and T4 is normal, I think it is reasonable to continue holding the medication and she can discuss this further with her primary.  CT of her head and neck were obtained given her fall and symptoms and both of these were negative for acute traumatic injuries.  I discussed all these results with the patient and her daughter.  She was feeling improved with therapies given here.  I suspect her neck pain is more muscular in nature which could be a result from her fall causing a strain.  Fortunately no signs of spinal injury or fractures.  Headaches likely related to tension.  I went over treatment options with the patient and she was agreeable to use of over-the-counter ibuprofen or Tylenol for pain since she typically tolerates these well.  I will give her a prescription of Robaxin for muscle spasm relief after going over option of either muscle relaxer or continued narcotics.  She preferred to try the muscle relaxers.  Side effects were discussed.  She has an appointment in 2 days with her primary care provider where she can be reevaluated.  I did place a referral to physical therapy for her as well for further management of her neck issues.  She was given instructions to return to the ED if she has any new or worsening symptoms.  All questions answered and patient discharged home in stable condition.     I have reviewed the nursing notes.    I have reviewed the findings, diagnosis, plan and need for follow up with the patient.      Discharge Medication List as of 3/30/2025  4:40 PM        START taking these medications    Details   !! methocarbamol (ROBAXIN) 500 MG tablet Take 1 tablet (500 mg) by mouth 4 times daily as needed., Disp-20  tablet, R-0, E-Prescribe      !! methocarbamol (ROBAXIN) 500 MG tablet Take 1 tablet (500 mg) by mouth 4 times daily as needed., Disp-4 tablet, R-0, Local Print       !! - Potential duplicate medications found. Please discuss with provider.          Final diagnoses:   Neck muscle spasm     Note: Chart documentation done in part with Dragon Voice Recognition software. Although reviewed after completion, some word and grammatical errors may remain.     3/30/2025   [facility] EMERGENCY DEPT       Yanely Lay PA-C  03/30/25 [id number]

## 2025-04-01 ENCOUNTER — OFFICE VISIT (OUTPATIENT)
Dept: FAMILY MEDICINE | Facility: OTHER | Age: 89
End: 2025-04-01
Payer: MEDICARE

## 2025-04-01 VITALS
TEMPERATURE: 98.2 F | HEIGHT: 62 IN | DIASTOLIC BLOOD PRESSURE: 80 MMHG | OXYGEN SATURATION: 97 % | BODY MASS INDEX: 23.92 KG/M2 | SYSTOLIC BLOOD PRESSURE: 130 MMHG | WEIGHT: 130 LBS | HEART RATE: 70 BPM | RESPIRATION RATE: 20 BRPM

## 2025-04-01 DIAGNOSIS — M54.2 NECK PAIN: Primary | ICD-10-CM

## 2025-04-01 DIAGNOSIS — E03.9 HYPOTHYROIDISM, UNSPECIFIED TYPE: ICD-10-CM

## 2025-04-01 PROCEDURE — G2211 COMPLEX E/M VISIT ADD ON: HCPCS | Performed by: FAMILY MEDICINE

## 2025-04-01 PROCEDURE — 3079F DIAST BP 80-89 MM HG: CPT | Performed by: FAMILY MEDICINE

## 2025-04-01 PROCEDURE — 99214 OFFICE O/P EST MOD 30 MIN: CPT | Performed by: FAMILY MEDICINE

## 2025-04-01 PROCEDURE — 3075F SYST BP GE 130 - 139MM HG: CPT | Performed by: FAMILY MEDICINE

## 2025-04-01 RX ORDER — LEVOTHYROXINE SODIUM 50 UG/1
50 TABLET ORAL DAILY
Qty: 60 TABLET | Refills: 0 | Status: SHIPPED | OUTPATIENT
Start: 2025-04-01

## 2025-04-01 NOTE — PROGRESS NOTES
Assessment & Plan     Neck pain  Recommend she continue on Robaxin as needed.  Also recommend Tylenol 1000 mg up to 3 times a day.  Discussed I was okay for her to take an ibuprofen intermittently but would not want this to be a regular medication for her.  She will continue with the Biofreeze.  Did give her phone number so she can schedule physical therapy.    Hypothyroidism, unspecified type  We discussed that maybe we want to try a decreased dose of her levothyroxine.  I think now that she has been off of that a little longer this is why she is having more of the decreased energy.  I suspect that the labs drawn in the ER the TSH was elevated as she is not taking her medication anymore and that the T4 most likely will continue to drop.  Will start her on levothyroxine 50 mcg instead of her 88 mcg.  Will recheck her labs in 6 weeks.    - levothyroxine (SYNTHROID/LEVOTHROID) 50 MCG tablet; Take 1 tablet (50 mcg) by mouth daily.  - **TSH with free T4 reflex FUTURE 2mo; Future    The longitudinal plan of care for the diagnosis(es)/condition(s) as documented were addressed during this visit. Due to the added complexity in care, I will continue to support Erwin in the subsequent management and with ongoing continuity of care.    Emily Hood is a 89 year old, presenting for the following health issues:  Neck Pain (1 month)        4/1/2025     3:11 PM   Additional Questions   Roomed by nikolai   Accompanied by daughter     History of Present Illness       Reason for visit:  Bowel issues and neck pain    She eats 2-3 servings of fruits and vegetables daily.She consumes 1 sweetened beverage(s) daily.She exercises with enough effort to increase her heart rate 9 or less minutes per day.  She exercises with enough effort to increase her heart rate 3 or less days per week.   She is taking medications regularly.      She is not taking her thyroid medication, she is thinking her bowel issues were from stopping that  "medication    She ran out of her thyroid medication and states that she felt better off of it.  She states that she did not have any aches and pains and had more energy.  However after being off of it for a week she now is noticing more aches and feeling fatigued.  She also states during this time she was having loose stools and this improved after stopping her laxatives.    ED/UC Followup:    Facility:  [facility]   Date of visit: 3/30/25  Reason for visit: neck pain  Current Status: still has pain, taking muscle relaxer and tylenol- then she was able to sleep last night    A couple days ago she was seen in the emergency department for neck pain.  She had reported falling about a month ago.  At that time she did not have neck pain.  However since then she was having intermittent neck pain and then a few days ago she started having worsening neck pain.  She denies any numbness and tingling in her hands.  She denies any weakness in her hands.  CT of the brain and neck were negative for any acute causes.  She was discharged with Tylenol and Robaxin.  She feels that the Tylenol Robaxin are helping with sleep and sometimes she has taken an ibuprofen which is also helped.  She is using Biofreeze.  She has not scheduled with physical therapy yet.        Objective    /80 (BP Location: Left arm, Patient Position: Sitting, Cuff Size: Adult Regular)   Pulse 70   Temp 98.2  F (36.8  C) (Temporal)   Resp 20   Ht 1.575 m (5' 2\")   Wt 59 kg (130 lb)   SpO2 97%   BMI 23.78 kg/m    Body mass index is 23.78 kg/m .  Physical Exam   Gen: no apparent distress  Neck: Significant tenderness bilateral strap muscles.  No erythema or skin changes.  She has decreased range of motion in all directions secondary to pain.  No spinous process tenderness.  Neuro: Strength 5 out of 5 bilateral upper extremities.  Sensation grossly intact.   strength intact.          Signed Electronically by: Jacqueline Brandt MD    "

## 2025-05-26 DIAGNOSIS — E03.9 HYPOTHYROIDISM, UNSPECIFIED TYPE: ICD-10-CM

## 2025-05-27 RX ORDER — LEVOTHYROXINE SODIUM 50 UG/1
50 TABLET ORAL DAILY
Qty: 60 TABLET | Refills: 0 | Status: SHIPPED | OUTPATIENT
Start: 2025-05-27

## 2025-05-27 NOTE — TELEPHONE ENCOUNTER
Patient informed via mychart to schedule, will postpone to see if patient read if not please call.   Yolanda Mai MA

## 2025-05-28 ENCOUNTER — APPOINTMENT (OUTPATIENT)
Dept: CT IMAGING | Facility: CLINIC | Age: 89
End: 2025-05-28
Attending: FAMILY MEDICINE
Payer: MEDICARE

## 2025-05-28 ENCOUNTER — HOSPITAL ENCOUNTER (EMERGENCY)
Facility: CLINIC | Age: 89
Discharge: HOME | End: 2025-05-29
Attending: FAMILY MEDICINE | Admitting: FAMILY MEDICINE
Payer: MEDICARE
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
## 2025-05-28 DIAGNOSIS — S00.03XA CONTUSION OF SCALP, INITIAL ENCOUNTER: ICD-10-CM

## 2025-05-28 PROCEDURE — 72125 CT NECK SPINE W/O DYE: CPT

## 2025-05-28 PROCEDURE — 70450 CT HEAD/BRAIN W/O DYE: CPT

## 2025-05-28 PROCEDURE — 99283 EMERGENCY DEPT VISIT LOW MDM: CPT | Performed by: FAMILY MEDICINE

## 2025-05-28 PROCEDURE — 99284 EMERGENCY DEPT VISIT MOD MDM: CPT | Mod: 25 | Performed by: FAMILY MEDICINE

## 2025-05-28 ASSESSMENT — COLUMBIA-SUICIDE SEVERITY RATING SCALE - C-SSRS
6. HAVE YOU EVER DONE ANYTHING, STARTED TO DO ANYTHING, OR PREPARED TO DO ANYTHING TO END YOUR LIFE?: NO
2. HAVE YOU ACTUALLY HAD ANY THOUGHTS OF KILLING YOURSELF IN THE PAST MONTH?: NO
1. IN THE PAST MONTH, HAVE YOU WISHED YOU WERE DEAD OR WISHED YOU COULD GO TO SLEEP AND NOT WAKE UP?: NO

## 2025-05-29 VITALS
DIASTOLIC BLOOD PRESSURE: 77 MMHG | TEMPERATURE: 97.7 F | OXYGEN SATURATION: 93 % | BODY MASS INDEX: 25.06 KG/M2 | HEART RATE: 68 BPM | SYSTOLIC BLOOD PRESSURE: 145 MMHG | WEIGHT: 137 LBS | RESPIRATION RATE: 16 BRPM

## 2025-05-29 ASSESSMENT — ACTIVITIES OF DAILY LIVING (ADL)
ADLS_ACUITY_SCORE: 41

## 2025-05-29 NOTE — ED TRIAGE NOTES
"Presents via EMS from [facility] for a fall. States she had been drinking tonight, prefers white russians, had 3-4 drinks as she \"does every night\". Could not sleep, got up to take a melatonin and fell over. Hit back of head, reports it is sore. No bleeding noted, bump to back of head. Not on blood thinners.     Triage Assessment (Adult)       Row Name 05/28/25 2482          Triage Assessment    Airway WDL WDL        Respiratory WDL    Respiratory WDL WDL        Skin Circulation/Temperature WDL    Skin Circulation/Temperature WDL WDL        Cardiac WDL    Cardiac WDL WDL        Peripheral/Neurovascular WDL    Peripheral Neurovascular WDL WDL        Cognitive/Neuro/Behavioral WDL    Cognitive/Neuro/Behavioral WDL X     Level of Consciousness alert     Orientation oriented x 4     Speech slurred  patient states she had been drinking        Pupils (CN II)    Pupil PERRLA yes     Pupil Size Left 2 mm     Pupil Size Right 2 mm        [place] Coma Scale    Best Eye Response 4-->(E4) spontaneous     Best Motor Response 6-->(M6) obeys commands     Best Verbal Response 5-->(V5) oriented     [place] Coma Scale Score 15                     "

## 2025-05-29 NOTE — ED PROVIDER NOTES
"  History     Chief Complaint   Patient presents with    Fall     HPI  Erwin Tolliver is a 89 year old female who presents to the ED by ambulance after suffering a fall at home.  She had 3 or 4 White Russians tonight like \"she does every night\".  Could not sleep so she got up to take some melatonin and fell hitting the back of her head.  She is not sure what she hit it on but she does have some swelling over the left occiput.  Denies any neck pain.  Denies any upper or lower extremity pain.  No chest or abdominal pain.  Not on blood thinners.      C-collar placed by medics prior to arrival in the ED.     as of .    of cancer.  She misses him dearly after 60 years of marriage.    Daughter and son-in-law are here.  They live in [place].      Allergies:  Allergies   Allergen Reactions    Aspirin      headaches,tremors,nausea    Augmentin [Amoxicillin-Pot Clavulanate] Nausea and Vomiting       Problem List:    Patient Active Problem List    Diagnosis Date Noted    Chronic kidney disease, stage 2 (mild) 2022     Priority: Medium    Other chronic sinusitis 2017     Priority: Medium    Hypertension, goal below 150/90 2015     Priority: Medium    Macular degeneration (senile) of retina 2014     Priority: Medium    Moderate recurrent major depression (H) 10/01/2013     Priority: Medium    Atopic rhinitis 2012     Priority: Medium    GERD (gastroesophageal reflux disease) 2011     Priority: Medium    Osteopenia 11/15/2010     Priority: Medium    Hyperlipidemia, unspecified 10/31/2010     Priority: Medium    Hypothyroidism 2006     Priority: Medium    Migraine with aura      Priority: Medium        Past Medical History:    Past Medical History:   Diagnosis Date    Arthritis     CKD (chronic kidney disease) stage 3, GFR 30-59 ml/min (H) 8/15/2012    Depressive disorder     Depressive disorder, not elsewhere classified     Hepatitis, unspecified     " Hypertension     Migraine with aura, without mention of intractable migraine without mention of status migrainosus     Need for prophylactic hormone replacement therapy (postmenopausal)     Palpitations     SVT (supraventricular tachycardia) 10/8/2012    Thyroid disease     Unspecified essential hypertension        Past Surgical History:    Past Surgical History:   Procedure Laterality Date    APPENDECTOMY      BACK SURGERY      bulged disc    COLONOSCOPY      ENT SURGERY      EYE SURGERY      HC KNEE SCOPE, DIAGNOSTIC      Arthroscopy, Knee    HC REDUCTION OF LARGE BREAST      ORTHOPEDIC SURGERY      SEPTOPLASTY N/A 2017    Procedure: SEPTOPLASTY;  Septoplasty, Submucosal resection of the turbinates;  Surgeon: Edwin Cox MD;  Location: [facility]    SHOULDER SURGERY  2019    Z TOTAL ABDOM HYSTERECTOMY      Hysterectomy, Total Abdominal and BSO -benign - age 41    [facility]       Family History:    Family History   Problem Relation Age of Onset    Alzheimer Disease Mother     Arthritis Sister          at 72 years    Lipids Sister     Osteoporosis Sister         osteoporosis    Genetic Disorder Sister         down syndrome    Alzheimer Disease Sister     Thyroid Disease Sister     Cardiovascular Brother          at 72 years of CHF       Social History:  Marital Status:   [5]  Social History     Tobacco Use    Smoking status: Former     Current packs/day: 0.00     Average packs/day: 0.5 packs/day for 10.0 years (5.0 ttl pk-yrs)     Types: Cigarettes     Start date: 1956     Quit date: 1966     Years since quittin.4    Smokeless tobacco: Never    Tobacco comments:     no smokers in household   Vaping Use    Vaping status: Never Used   Substance Use Topics    Alcohol use: No     Alcohol/week: 1.7 standard drinks of alcohol     Comment: beerx2/ x8per month    Drug use: No        Medications:    escitalopram (LEXAPRO) 10 MG tablet  ipratropium  (ATROVENT) 0.03 % nasal spray  levothyroxine (SYNTHROID/LEVOTHROID) 50 MCG tablet  losartan (COZAAR) 50 MG tablet  methocarbamol (ROBAXIN) 500 MG tablet  methocarbamol (ROBAXIN) 500 MG tablet  metoprolol succinate ER (TOPROL XL) 100 MG 24 hr tablet  Multiple Vitamins-Minerals (PRESERVISION AREDS PO)  nitroGLYcerin (NITROSTAT) 0.4 MG sublingual tablet  omeprazole (PRILOSEC) 20 MG DR capsule  simvastatin (ZOCOR) 20 MG tablet  SUMAtriptan (IMITREX) 25 MG tablet  traZODone (DESYREL) 100 MG tablet          Review of Systems   All other systems reviewed and are negative.      Physical Exam   BP: (!) 144/71  Pulse: 66  Temp: 97.7  F (36.5  C)  Resp: 16  Weight: 62.1 kg (137 lb)  SpO2: 93 %      Physical Exam  Constitutional:       General: She is not in acute distress.     Appearance: Normal appearance.   HENT:      Head: Contusion present.     Eyes:      Extraocular Movements: Extraocular movements intact.      Pupils: Pupils are equal, round, and reactive to light.   Neck:      Comments: C collar in place  Cardiovascular:      Rate and Rhythm: Normal rate and regular rhythm.   Pulmonary:      Effort: Pulmonary effort is normal.      Breath sounds: Normal breath sounds.   Musculoskeletal:      Cervical back: No spinous process tenderness.      Comments: No tenderness of the shoulders or hips.  No upper or lower extremity injuries noted.  Moves all extremities equally.  Easily lifts both heels off of the bed.   Neurological:      Mental Status: She is alert and oriented to person, place, and time.      GCS: GCS eye subscore is 4. GCS verbal subscore is 5. GCS motor subscore is 6.      Cranial Nerves: Cranial nerves 2-12 are intact.      Sensory: Sensation is intact.      Motor: Motor function is intact.         ED Course        Procedures              Critical Care time:  none     None         Results for orders placed or performed during the hospital encounter of 05/28/25 (from the past 24 hours)   CT Head w/o Contrast     Narrative    EXAM: CT HEAD W/O CONTRAST  LOCATION: [facility]  DATE: 5/28/2025    INDICATION: Fall, occipital pain, swelling, EtOH  COMPARISON: None.  TECHNIQUE: Routine CT Head without IV contrast. Multiplanar reformats. Dose reduction techniques were used.    FINDINGS:  INTRACRANIAL CONTENTS: No intracranial hemorrhage, extraaxial collection, or mass effect.  No CT evidence of acute infarct. Mild to moderate presumed chronic small vessel ischemic changes. Mild to moderate generalized volume loss. No hydrocephalus.     VISUALIZED ORBITS/SINUSES/MASTOIDS: No intraorbital abnormality. No paranasal sinus mucosal disease. No middle ear or mastoid effusion.    BONES/SOFT TISSUES: No fracture. Moderate soft tissue scalp swelling left parietal scalp.      Impression    IMPRESSION:  1.  No CT evidence for acute intracranial process.  2.  Brain atrophy and presumed chronic microvascular ischemic changes as above.     CT Cervical Spine w/o Contrast    Narrative    EXAM: CT CERVICAL SPINE W/O CONTRAST  LOCATION: [facility]  DATE: 5/28/2025    INDICATION: Fall, occipital pain.  COMPARISON: None.  TECHNIQUE: Routine CT Cervical Spine without IV contrast. Multiplanar reformats. Dose reduction techniques were used.    FINDINGS:  VERTEBRA: At C4-5, grade 1 anterolisthesis. Normal vertebral body heights. No fracture or posttraumatic subluxation.     CANAL/FORAMINA: At C3-4, severe left foraminal stenosis. At C4-5, moderate bilateral foraminal stenosis. At C5-6, moderate bilateral foraminal stenosis.    PARASPINAL: No extraspinal abnormality.      Impression    IMPRESSION:  1.  No fracture or posttraumatic subluxation.         Medications - No data to display    Assessments & Plan (with Medical Decision Making)  89-year-old female had her usual 3 or 4 alcoholic beverages tonight but could not sleep so she got up to take some melatonin.  She fell backwards hitting the  left occiput.  Denies any other injuries.  Brought in by ambulance in a c-collar.  Has no midline neck tenderness.  Does have moderate swelling and tenderness of the left occipital scalp.  No other injuries noted.  CT of the head and C-spine were negative for any fracture, bleed or other acute injury.  We got her up on her feet but she is a bit unsteady.  I do not think she is safe to go home.  She has some alcohol on board so we will plan to watch her in the ER tonight and retest her again in the morning.  I do not think it is worth putting her upstairs on observation when she is probably going to go home in the next 8-12 hours.   her daughter and son-in-law are here and all are in agreement.  I do not want her going home, falling and suffering a more severe injury.  She lives alone in a senior living building without any services.  6:52 AM: She got up and was walking in the hallway without difficulty.  No dizziness and seems steady on her feet.  Feels ready to go home.  Family was called and they will come and get her.     I have reviewed the nursing notes.    I have reviewed the findings, diagnosis, plan and need for follow up with the patient.           Medical Decision Making  The patient's presentation was of moderate complexity (an acute complicated injury).    The patient's evaluation involved:  an assessment requiring an independent historian (family)  ordering and/or review of 2 test(s) in this encounter (see separate area of note for details)    The patient's management necessitated moderate risk (limitations due to social determinants of health (senior, living alone, alcohol use and substance use)).        New Prescriptions    No medications on file       Final diagnoses:   Contusion of scalp, initial encounter - left occiput   Fall at home, initial encounter       5/28/2025   [facility] EMERGENCY DEPT       Taye Coffey MD  05/29/25 [id number]

## 2025-05-29 NOTE — DISCHARGE INSTRUCTIONS
Ice 20 minutes per hour, (20 minutes on, 40 minutes off) at least 4 times a day.  Tylenol would be the safest medication to use for pain.  Get up slowly so you do not get dizzy and fall.  Recheck in clinic with Dr. Brandt if persistent problems.  Return to the ED if you worsen or have any concerns.  It was a true pleasure visiting with all of you tonight.  I am glad you are feeling better and I wish you the very best going forward.    Thank you for choosing [facility]. We appreciate the opportunity to meet your urgent medical needs. Please let us know if we could have done anything to make your stay more satisfying.    After discharge, please closely monitor for any new or worsening symptoms. Return to the Emergency Department if you develop any acute worsening signs or symptoms.    If you had lab work, cultures or imaging studies done during your stay, the final results may still be pending. We will call you if your plan of care needs to change. However, if you are not improving as expected, please follow up with your primary care provider or clinic.     Start any prescription medications that were prescribed to you and take them as directed.     Please see additional handouts that may be pertinent to your condition.

## 2025-05-29 NOTE — ED NOTES
Up to ambulate to restroom, tolerated well with A1/SBA. Pt did have some dizziness. Pt will obs until AM and re-evaluate. Pt positioned for comfort. Call light in reach. Side rails x2.

## 2025-06-03 ENCOUNTER — RESULTS FOLLOW-UP (OUTPATIENT)
Dept: FAMILY MEDICINE | Facility: OTHER | Age: 89
End: 2025-06-03

## 2025-06-03 ENCOUNTER — LAB (OUTPATIENT)
Dept: LAB | Facility: OTHER | Age: 89
End: 2025-06-03
Payer: MEDICARE

## 2025-06-03 DIAGNOSIS — E03.9 HYPOTHYROIDISM, UNSPECIFIED TYPE: ICD-10-CM

## 2025-06-03 LAB — TSH SERPL DL<=0.005 MIU/L-ACNC: 2.26 UIU/ML (ref 0.3–4.2)

## 2025-06-03 PROCEDURE — 84443 ASSAY THYROID STIM HORMONE: CPT

## 2025-06-03 PROCEDURE — 36415 COLL VENOUS BLD VENIPUNCTURE: CPT

## 2025-06-05 RX ORDER — LEVOTHYROXINE SODIUM 88 UG/1
88 TABLET ORAL DAILY
COMMUNITY
Start: 2025-06-05

## 2025-07-24 ENCOUNTER — LAB (OUTPATIENT)
Dept: LAB | Facility: OTHER | Age: 89
End: 2025-07-24
Payer: MEDICARE

## 2025-07-24 ENCOUNTER — RESULTS FOLLOW-UP (OUTPATIENT)
Dept: FAMILY MEDICINE | Facility: OTHER | Age: 89
End: 2025-07-24

## 2025-07-24 DIAGNOSIS — N18.2 CHRONIC KIDNEY DISEASE, STAGE 2 (MILD): Primary | ICD-10-CM

## 2025-07-24 DIAGNOSIS — E03.9 HYPOTHYROIDISM, UNSPECIFIED TYPE: ICD-10-CM

## 2025-07-24 LAB
T4 FREE SERPL-MCNC: 1.28 NG/DL (ref 0.9–1.7)
TSH SERPL DL<=0.005 MIU/L-ACNC: 4.72 UIU/ML (ref 0.3–4.2)

## 2025-07-24 RX ORDER — LEVOTHYROXINE SODIUM 88 UG/1
88 TABLET ORAL DAILY
Qty: 90 TABLET | Refills: 0 | Status: SHIPPED | OUTPATIENT
Start: 2025-07-24

## 2025-07-30 NOTE — TELEPHONE ENCOUNTER
Spoke to daughter regarding concerns. Patient is having intermittent, incontinent bowel concerns over the last few weeks and would like to schedule appt to discuss. Appt scheduled.    Noemi PÉREZ RN

## 2025-08-05 ENCOUNTER — OFFICE VISIT (OUTPATIENT)
Dept: FAMILY MEDICINE | Facility: OTHER | Age: 89
End: 2025-08-05
Payer: MEDICARE

## 2025-08-05 VITALS
BODY MASS INDEX: 23.55 KG/M2 | DIASTOLIC BLOOD PRESSURE: 60 MMHG | WEIGHT: 128 LBS | HEART RATE: 79 BPM | RESPIRATION RATE: 16 BRPM | OXYGEN SATURATION: 94 % | SYSTOLIC BLOOD PRESSURE: 120 MMHG | TEMPERATURE: 98.1 F | HEIGHT: 62 IN

## 2025-08-05 DIAGNOSIS — M54.2 NECK PAIN: ICD-10-CM

## 2025-08-05 DIAGNOSIS — K59.00 CONSTIPATION, UNSPECIFIED CONSTIPATION TYPE: Primary | ICD-10-CM

## 2025-08-05 PROCEDURE — 3074F SYST BP LT 130 MM HG: CPT | Performed by: FAMILY MEDICINE

## 2025-08-05 PROCEDURE — 3078F DIAST BP <80 MM HG: CPT | Performed by: FAMILY MEDICINE

## 2025-08-05 PROCEDURE — 99214 OFFICE O/P EST MOD 30 MIN: CPT | Performed by: FAMILY MEDICINE

## 2025-08-05 RX ORDER — METHOCARBAMOL 500 MG/1
500 TABLET, FILM COATED ORAL 4 TIMES DAILY PRN
Qty: 20 TABLET | Refills: 0 | Status: SHIPPED | OUTPATIENT
Start: 2025-08-05

## 2025-08-05 ASSESSMENT — PATIENT HEALTH QUESTIONNAIRE - PHQ9
SUM OF ALL RESPONSES TO PHQ QUESTIONS 1-9: 2
10. IF YOU CHECKED OFF ANY PROBLEMS, HOW DIFFICULT HAVE THESE PROBLEMS MADE IT FOR YOU TO DO YOUR WORK, TAKE CARE OF THINGS AT HOME, OR GET ALONG WITH OTHER PEOPLE: NOT DIFFICULT AT ALL
SUM OF ALL RESPONSES TO PHQ QUESTIONS 1-9: 2

## 2025-08-20 ENCOUNTER — MYC MEDICAL ADVICE (OUTPATIENT)
Dept: FAMILY MEDICINE | Facility: OTHER | Age: 89
End: 2025-08-20
Payer: MEDICARE

## 2025-08-20 DIAGNOSIS — I10 HYPERTENSION, GOAL BELOW 150/90: ICD-10-CM

## 2025-08-20 RX ORDER — LOSARTAN POTASSIUM 50 MG/1
75 TABLET ORAL DAILY
Qty: 135 TABLET | Refills: 3 | OUTPATIENT
Start: 2025-08-20

## 2025-08-25 ENCOUNTER — NURSE TRIAGE (OUTPATIENT)
Dept: FAMILY MEDICINE | Facility: OTHER | Age: 89
End: 2025-08-25
Payer: MEDICARE

## 2025-09-03 ENCOUNTER — NURSE TRIAGE (OUTPATIENT)
Dept: FAMILY MEDICINE | Facility: OTHER | Age: 89
End: 2025-09-03
Payer: MEDICARE

## 2025-09-04 ENCOUNTER — HOSPITAL ENCOUNTER (OUTPATIENT)
Facility: CLINIC | Age: 89
Setting detail: OBSERVATION
End: 2025-09-04
Attending: EMERGENCY MEDICINE | Admitting: INTERNAL MEDICINE
Payer: MEDICARE

## 2025-09-04 VITALS
SYSTOLIC BLOOD PRESSURE: 101 MMHG | HEIGHT: 62 IN | OXYGEN SATURATION: 92 % | BODY MASS INDEX: 23.19 KG/M2 | DIASTOLIC BLOOD PRESSURE: 48 MMHG | WEIGHT: 126 LBS | RESPIRATION RATE: 17 BRPM | HEART RATE: 74 BPM | TEMPERATURE: 99.2 F

## 2025-09-04 DIAGNOSIS — C20 RECTAL CANCER (H): Primary | ICD-10-CM

## 2025-09-04 DIAGNOSIS — R55 SYNCOPE, UNSPECIFIED SYNCOPE TYPE: ICD-10-CM

## 2025-09-04 PROBLEM — N17.9 AKI (ACUTE KIDNEY INJURY): Status: ACTIVE | Noted: 2025-09-04

## 2025-09-04 PROBLEM — D64.9 ANEMIA: Status: ACTIVE | Noted: 2025-09-04

## 2025-09-04 PROBLEM — K62.89 RECTAL MASS: Status: ACTIVE | Noted: 2025-09-04

## 2025-09-04 LAB
ALBUMIN UR-MCNC: NEGATIVE MG/DL
ANION GAP SERPL CALCULATED.3IONS-SCNC: 11 MMOL/L (ref 7–15)
APPEARANCE UR: CLEAR
ATRIAL RATE - MUSE: 72 BPM
BACTERIA #/AREA URNS HPF: ABNORMAL /HPF
BASOPHILS # BLD AUTO: 0.03 10E3/UL (ref 0–0.2)
BASOPHILS NFR BLD AUTO: 0.3 %
BILIRUB UR QL STRIP: NEGATIVE
BUN SERPL-MCNC: 13 MG/DL (ref 8–23)
CALCIUM SERPL-MCNC: 8.7 MG/DL (ref 8.8–10.4)
CHLORIDE SERPL-SCNC: 99 MMOL/L (ref 98–107)
COLOR UR AUTO: ABNORMAL
CREAT SERPL-MCNC: 1.04 MG/DL (ref 0.51–0.95)
DIASTOLIC BLOOD PRESSURE - MUSE: NORMAL MMHG
EGFRCR SERPLBLD CKD-EPI 2021: 51 ML/MIN/1.73M2
EOSINOPHIL # BLD AUTO: 0.15 10E3/UL (ref 0–0.7)
EOSINOPHIL NFR BLD AUTO: 1.4 %
ERYTHROCYTE [DISTWIDTH] IN BLOOD BY AUTOMATED COUNT: 12.9 % (ref 10–15)
ETHANOL SERPL-MCNC: <0.01 G/DL
GLUCOSE SERPL-MCNC: 116 MG/DL (ref 70–99)
GLUCOSE UR STRIP-MCNC: NEGATIVE MG/DL
HCO3 SERPL-SCNC: 23 MMOL/L (ref 22–29)
HCT VFR BLD AUTO: 30.9 % (ref 35–47)
HGB BLD-MCNC: 10.1 G/DL (ref 11.7–15.7)
HGB UR QL STRIP: NEGATIVE
IMM GRANULOCYTES # BLD: 0.04 10E3/UL
IMM GRANULOCYTES NFR BLD: 0.4 %
INTERPRETATION ECG - MUSE: NORMAL
KETONES UR STRIP-MCNC: NEGATIVE MG/DL
LEUKOCYTE ESTERASE UR QL STRIP: ABNORMAL
LYMPHOCYTES # BLD AUTO: 1.03 10E3/UL (ref 0.8–5.3)
LYMPHOCYTES NFR BLD AUTO: 9.6 %
MAGNESIUM SERPL-MCNC: 1.8 MG/DL (ref 1.7–2.3)
MCH RBC QN AUTO: 30.4 PG (ref 26.5–33)
MCHC RBC AUTO-ENTMCNC: 32.7 G/DL (ref 31.5–36.5)
MCV RBC AUTO: 93.1 FL (ref 78–100)
MONOCYTES # BLD AUTO: 0.8 10E3/UL (ref 0–1.3)
MONOCYTES NFR BLD AUTO: 7.5 %
NEUTROPHILS # BLD AUTO: 8.67 10E3/UL (ref 1.6–8.3)
NEUTROPHILS NFR BLD AUTO: 80.8 %
NITRATE UR QL: NEGATIVE
NRBC # BLD AUTO: <0.03 10E3/UL
NRBC BLD AUTO-RTO: 0 /100
P AXIS - MUSE: 72 DEGREES
PH UR STRIP: 5.5 [PH] (ref 5–7)
PLATELET # BLD AUTO: 244 10E3/UL (ref 150–450)
POTASSIUM SERPL-SCNC: 4.2 MMOL/L (ref 3.4–5.3)
PR INTERVAL - MUSE: 200 MS
QRS DURATION - MUSE: 78 MS
QT - MUSE: 412 MS
QTC - MUSE: 451 MS
R AXIS - MUSE: -36 DEGREES
RBC # BLD AUTO: 3.32 10E6/UL (ref 3.8–5.2)
RBC URINE: 5 /HPF
SODIUM SERPL-SCNC: 133 MMOL/L (ref 135–145)
SP GR UR STRIP: 1.03 (ref 1–1.03)
SQUAMOUS EPITHELIAL: 1 /HPF
SYSTOLIC BLOOD PRESSURE - MUSE: NORMAL MMHG
T AXIS - MUSE: -1 DEGREES
TROPONIN T SERPL HS-MCNC: 11 NG/L
TROPONIN T SERPL HS-MCNC: 12 NG/L
TSH SERPL DL<=0.005 MIU/L-ACNC: 2.7 UIU/ML (ref 0.3–4.2)
UROBILINOGEN UR STRIP-MCNC: NORMAL MG/DL
VENTRICULAR RATE- MUSE: 72 BPM
WBC # BLD AUTO: 10.72 10E3/UL (ref 4–11)
WBC CLUMPS #/AREA URNS HPF: PRESENT /HPF
WBC URINE: 47 /HPF

## 2025-09-04 PROCEDURE — G0378 HOSPITAL OBSERVATION PER HR: HCPCS

## 2025-09-04 PROCEDURE — 81001 URINALYSIS AUTO W/SCOPE: CPT | Performed by: EMERGENCY MEDICINE

## 2025-09-04 PROCEDURE — 250N000013 HC RX MED GY IP 250 OP 250 PS 637: Performed by: EMERGENCY MEDICINE

## 2025-09-04 PROCEDURE — 82374 ASSAY BLOOD CARBON DIOXIDE: CPT | Performed by: EMERGENCY MEDICINE

## 2025-09-04 PROCEDURE — 84443 ASSAY THYROID STIM HORMONE: CPT | Performed by: EMERGENCY MEDICINE

## 2025-09-04 PROCEDURE — 258N000003 HC RX IP 258 OP 636: Performed by: EMERGENCY MEDICINE

## 2025-09-04 PROCEDURE — 84484 ASSAY OF TROPONIN QUANT: CPT | Performed by: EMERGENCY MEDICINE

## 2025-09-04 PROCEDURE — 83735 ASSAY OF MAGNESIUM: CPT | Performed by: EMERGENCY MEDICINE

## 2025-09-04 PROCEDURE — 36415 COLL VENOUS BLD VENIPUNCTURE: CPT | Performed by: EMERGENCY MEDICINE

## 2025-09-04 PROCEDURE — 255N000002 HC RX 255 OP 636: Performed by: EMERGENCY MEDICINE

## 2025-09-04 PROCEDURE — 85004 AUTOMATED DIFF WBC COUNT: CPT | Performed by: EMERGENCY MEDICINE

## 2025-09-04 PROCEDURE — 250N000009 HC RX 250: Performed by: EMERGENCY MEDICINE

## 2025-09-04 PROCEDURE — 82077 ASSAY SPEC XCP UR&BREATH IA: CPT | Performed by: EMERGENCY MEDICINE

## 2025-09-04 RX ORDER — BUPROPION HYDROCHLORIDE 300 MG/1
300 TABLET ORAL DAILY
Status: ON HOLD | COMMUNITY

## 2025-09-04 RX ORDER — ACETAMINOPHEN 325 MG/1
650 TABLET ORAL EVERY 4 HOURS PRN
Status: ACTIVE | OUTPATIENT
Start: 2025-09-04

## 2025-09-04 RX ORDER — ONDANSETRON 2 MG/ML
4 INJECTION INTRAMUSCULAR; INTRAVENOUS EVERY 6 HOURS PRN
Status: ACTIVE | OUTPATIENT
Start: 2025-09-04

## 2025-09-04 RX ORDER — ACETAMINOPHEN 650 MG/1
650 SUPPOSITORY RECTAL EVERY 4 HOURS PRN
Status: ACTIVE | OUTPATIENT
Start: 2025-09-04

## 2025-09-04 RX ORDER — QUETIAPINE FUMARATE 25 MG/1
25 TABLET, FILM COATED ORAL AT BEDTIME
Status: DISPENSED | OUTPATIENT
Start: 2025-09-04

## 2025-09-04 RX ORDER — ONDANSETRON 4 MG/1
4 TABLET, ORALLY DISINTEGRATING ORAL EVERY 6 HOURS PRN
Status: ACTIVE | OUTPATIENT
Start: 2025-09-04

## 2025-09-04 RX ORDER — PROCHLORPERAZINE MALEATE 5 MG/1
5 TABLET ORAL EVERY 6 HOURS PRN
Status: ACTIVE | OUTPATIENT
Start: 2025-09-04

## 2025-09-04 RX ORDER — LIDOCAINE 40 MG/G
CREAM TOPICAL
Status: ACTIVE | OUTPATIENT
Start: 2025-09-04

## 2025-09-04 RX ORDER — VENLAFAXINE HYDROCHLORIDE 150 MG/1
150 CAPSULE, EXTENDED RELEASE ORAL DAILY
Status: ON HOLD | COMMUNITY

## 2025-09-04 RX ADMIN — SODIUM CHLORIDE 60 ML: 9 INJECTION, SOLUTION INTRAVENOUS at 17:16

## 2025-09-04 RX ADMIN — IOHEXOL 77 ML: 350 INJECTION, SOLUTION INTRAVENOUS at 17:16

## 2025-09-04 RX ADMIN — SODIUM CHLORIDE 1000 ML: 0.9 INJECTION, SOLUTION INTRAVENOUS at 16:40

## 2025-09-04 RX ADMIN — QUETIAPINE FUMARATE 25 MG: 25 TABLET ORAL at 19:51

## 2025-09-04 ASSESSMENT — ACTIVITIES OF DAILY LIVING (ADL)
ADLS_ACUITY_SCORE: 41
ADLS_ACUITY_SCORE: 30
ADLS_ACUITY_SCORE: 41
ADLS_ACUITY_SCORE: 30

## 2025-09-04 ASSESSMENT — COLUMBIA-SUICIDE SEVERITY RATING SCALE - C-SSRS
2. HAVE YOU ACTUALLY HAD ANY THOUGHTS OF KILLING YOURSELF IN THE PAST MONTH?: NO
6. HAVE YOU EVER DONE ANYTHING, STARTED TO DO ANYTHING, OR PREPARED TO DO ANYTHING TO END YOUR LIFE?: NO
1. IN THE PAST MONTH, HAVE YOU WISHED YOU WERE DEAD OR WISHED YOU COULD GO TO SLEEP AND NOT WAKE UP?: NO

## (undated) DEVICE — TUBING SET OLYMPUS MULTI-DEBRIDER DECLOG TS101DC

## (undated) DEVICE — SU ETHILON 3-0 PS-2 18" 1669H

## (undated) DEVICE — BLADE SHAVER OLYMPUS 2MM SMR TYPE A BB2000SA

## (undated) DEVICE — SU CHROMIC 4-0 FS-2 27" 635H

## (undated) DEVICE — NDL ANGIOCATH 14GA 1.25" 3068

## (undated) DEVICE — SPONGE COTTONOID 1/2X3" 80-1407

## (undated) DEVICE — TAPE TRANSPORE 1/2"

## (undated) DEVICE — SPLINT NASAL DOYLE BREATHEASY 20-10500

## (undated) DEVICE — PACK HEAD NECK CUSTOM

## (undated) DEVICE — GLOVE PROTEXIS W/NEU-THERA 8.0  2D73TE80

## (undated) DEVICE — BLADE KNIFE SURG 15 371115

## (undated) RX ORDER — DEXAMETHASONE SODIUM PHOSPHATE 10 MG/ML
INJECTION INTRAMUSCULAR; INTRAVENOUS
Status: DISPENSED
Start: 2017-06-20

## (undated) RX ORDER — DIMENHYDRINATE 50 MG/ML
INJECTION, SOLUTION INTRAMUSCULAR; INTRAVENOUS
Status: DISPENSED
Start: 2017-06-20

## (undated) RX ORDER — ONDANSETRON 2 MG/ML
INJECTION INTRAMUSCULAR; INTRAVENOUS
Status: DISPENSED
Start: 2017-06-20

## (undated) RX ORDER — FENTANYL CITRATE 50 UG/ML
INJECTION, SOLUTION INTRAMUSCULAR; INTRAVENOUS
Status: DISPENSED
Start: 2017-06-20

## (undated) RX ORDER — MUPIROCIN 20 MG/G
OINTMENT TOPICAL
Status: DISPENSED
Start: 2017-06-20

## (undated) RX ORDER — OXYMETAZOLINE HYDROCHLORIDE 0.05 G/100ML
SPRAY NASAL
Status: DISPENSED
Start: 2017-06-20